# Patient Record
Sex: FEMALE | Race: WHITE | Employment: OTHER | URBAN - METROPOLITAN AREA
[De-identification: names, ages, dates, MRNs, and addresses within clinical notes are randomized per-mention and may not be internally consistent; named-entity substitution may affect disease eponyms.]

---

## 2019-02-19 ENCOUNTER — HOSPITAL ENCOUNTER (EMERGENCY)
Age: 84
Discharge: HOME | End: 2019-02-19
Attending: EMERGENCY MEDICINE
Payer: MEDICARE

## 2019-02-19 ENCOUNTER — APPOINTMENT (OUTPATIENT)
Dept: GENERAL RADIOLOGY | Age: 84
End: 2019-02-19
Payer: MEDICARE

## 2019-02-19 VITALS
DIASTOLIC BLOOD PRESSURE: 83 MMHG | OXYGEN SATURATION: 96 % | TEMPERATURE: 97.7 F | SYSTOLIC BLOOD PRESSURE: 174 MMHG | HEIGHT: 62 IN | BODY MASS INDEX: 23 KG/M2 | WEIGHT: 125 LBS | RESPIRATION RATE: 18 BRPM | HEART RATE: 90 BPM

## 2019-02-19 DIAGNOSIS — S52.562A CLOSED BARTON'S FRACTURE OF LEFT RADIUS, INITIAL ENCOUNTER: Primary | ICD-10-CM

## 2019-02-19 PROCEDURE — 73110 X-RAY EXAM OF WRIST: CPT

## 2019-02-19 PROCEDURE — 6370000000 HC RX 637 (ALT 250 FOR IP): Performed by: EMERGENCY MEDICINE

## 2019-02-19 PROCEDURE — 99283 EMERGENCY DEPT VISIT LOW MDM: CPT

## 2019-02-19 PROCEDURE — 29125 APPL SHORT ARM SPLINT STATIC: CPT

## 2019-02-19 PROCEDURE — 73090 X-RAY EXAM OF FOREARM: CPT

## 2019-02-19 RX ORDER — HYDROCODONE BITARTRATE AND ACETAMINOPHEN 5; 325 MG/1; MG/1
1 TABLET ORAL ONCE
Status: COMPLETED | OUTPATIENT
Start: 2019-02-19 | End: 2019-02-19

## 2019-02-19 RX ORDER — ROPINIROLE 0.25 MG/1
1 TABLET, FILM COATED ORAL NIGHTLY
COMMUNITY
Start: 2020-11-23 | End: 2021-09-28

## 2019-02-19 RX ORDER — HYDROCODONE BITARTRATE AND ACETAMINOPHEN 5; 325 MG/1; MG/1
1 TABLET ORAL EVERY 6 HOURS PRN
Qty: 12 TABLET | Refills: 0 | Status: SHIPPED | OUTPATIENT
Start: 2019-02-19 | End: 2019-02-22

## 2019-02-19 RX ADMIN — HYDROCODONE BITARTRATE AND ACETAMINOPHEN 1 TABLET: 5; 325 TABLET ORAL at 14:26

## 2019-02-19 ASSESSMENT — ENCOUNTER SYMPTOMS
SHORTNESS OF BREATH: 0
EYE PAIN: 0
RHINORRHEA: 0
NAUSEA: 0
COLOR CHANGE: 0
BACK PAIN: 0
DIARRHEA: 0
WHEEZING: 0
EYE REDNESS: 0
VOMITING: 0
SORE THROAT: 0
COUGH: 0
ABDOMINAL PAIN: 0
SINUS PRESSURE: 0

## 2019-02-19 ASSESSMENT — PAIN DESCRIPTION - ORIENTATION
ORIENTATION: LEFT
ORIENTATION: LEFT

## 2019-02-19 ASSESSMENT — PAIN DESCRIPTION - ONSET: ONSET: GRADUAL

## 2019-02-19 ASSESSMENT — PAIN DESCRIPTION - LOCATION
LOCATION: WRIST
LOCATION: ARM

## 2019-02-19 ASSESSMENT — PAIN DESCRIPTION - PROGRESSION: CLINICAL_PROGRESSION: GRADUALLY WORSENING

## 2019-02-19 ASSESSMENT — PAIN DESCRIPTION - DESCRIPTORS
DESCRIPTORS: SHARP;ACHING
DESCRIPTORS: ACHING

## 2019-02-19 ASSESSMENT — PAIN DESCRIPTION - PAIN TYPE: TYPE: ACUTE PAIN

## 2019-02-19 ASSESSMENT — PAIN SCALES - GENERAL
PAINLEVEL_OUTOF10: 10
PAINLEVEL_OUTOF10: 10
PAINLEVEL_OUTOF10: 8

## 2019-02-19 ASSESSMENT — PAIN DESCRIPTION - FREQUENCY
FREQUENCY: CONTINUOUS
FREQUENCY: CONTINUOUS

## 2020-01-09 ENCOUNTER — APPOINTMENT (OUTPATIENT)
Dept: CT IMAGING | Age: 85
End: 2020-01-09
Payer: COMMERCIAL

## 2020-01-09 ENCOUNTER — HOSPITAL ENCOUNTER (EMERGENCY)
Age: 85
Discharge: HOME | End: 2020-01-09
Attending: EMERGENCY MEDICINE
Payer: COMMERCIAL

## 2020-01-09 VITALS
TEMPERATURE: 96.4 F | BODY MASS INDEX: 23.41 KG/M2 | DIASTOLIC BLOOD PRESSURE: 81 MMHG | WEIGHT: 124 LBS | SYSTOLIC BLOOD PRESSURE: 190 MMHG | HEIGHT: 61 IN | HEART RATE: 92 BPM | RESPIRATION RATE: 16 BRPM

## 2020-01-09 PROCEDURE — 70450 CT HEAD/BRAIN W/O DYE: CPT

## 2020-01-09 PROCEDURE — 72125 CT NECK SPINE W/O DYE: CPT

## 2020-01-09 PROCEDURE — 99284 EMERGENCY DEPT VISIT MOD MDM: CPT

## 2020-01-09 NOTE — ED NOTES
Bed: 06  Expected date:   Expected time:   Means of arrival:   Comments:  ems     Anajna Smalls RN  01/09/20 5644

## 2020-01-09 NOTE — ED PROVIDER NOTES
HPI:  1/9/20, Time: 6:39 PM         June Deedee Zambrano is a 80 y.o. female presenting to the ED for fall. Patient mechanical fall at her nursing facility. She did strike her head, no loss of conscious. She does complain of occipital pain. Also complains of left-sided paraspinal neck pain. Denies any nausea, vomit, paresthesias, weakness in the extremities, or any other symptoms or complaints. Review of Systems:   Pertinent positives and negatives are stated within HPI, all other systems reviewed and are negative.          --------------------------------------------- PAST HISTORY ---------------------------------------------  Past Medical History:  has a past medical history of Anemia due to acute blood loss, C2 cervical fracture ([facility] [address] CAD (coronary artery disease), Fracture of left hip ([facility] [address] HTN (hypertension), Hyperlipidemia, Hyperlipidemia, Hypertension, and Protein calorie malnutrition ([facility] [address] Past Surgical History:  has a past surgical history that includes angioplasty; ECHO Compl W Dop Color Flow (11/7/2012); and Hip fracture surgery (Left, 5/25/15). Social History:  reports that she has never smoked. She has never used smokeless tobacco. She reports previous drug use. She reports that she does not drink alcohol. Family History: family history includes Heart Disease in her father and mother. The patients home medications have been reviewed. Allergies: Patient has no known allergies. -------------------------------------------------- RESULTS -------------------------------------------------  All laboratory and radiology results have been personally reviewed by myself   LABS:  No results found for this visit on 01/09/20. RADIOLOGY:  Interpreted by Radiologist.  Adolfo Earthly Contrast   Final Result   No indication for an acute intracranial process.             CT Cervical Spine WO Contrast   Final Result      No acute fracture or spondylolisthesis is identified on CT of cervical spine.      Diffuse degenerative disc and facet disease result in no severe   central or foraminal stenosis. Atherosclerotic vascular disease.          ------------------------- NURSING NOTES AND VITALS REVIEWED ---------------------------   The nursing notes within the ED encounter and vital signs as below have been reviewed. BP (!) 190/81   Pulse 92   Temp 96.4 °F (35.8 °C) (Temporal)   Resp 16   Ht 5' 1\" (1.549 m)   Wt 124 lb (56.2 kg)   Breastfeeding? No   BMI 23.43 kg/m²   Oxygen Saturation Interpretation: Normal      ---------------------------------------------------PHYSICAL EXAM--------------------------------------      Constitutional/General: Alert and oriented x3, well appearing, non toxic in NAD  Head: Normocephalic and atraumatic  Eyes: PERRL, EOMI  Mouth: Oropharynx clear, handling secretions, no trismus  Neck: Supple, full ROM, no C-spine tenderness or step-offs  Pulmonary: Lungs clear to auscultation bilaterally, no wheezes, rales, or rhonchi. Not in respiratory distress  Cardiovascular:  Regular rate and rhythm, no murmurs, gallops, or rubs. 2+ distal pulses  Abdomen: Soft, non tender, non distended, no guarding or rebound  Extremities: Moves all extremities x 4. Warm and well perfused  Skin: warm and dry without rash  Neurologic: GCS 15, no focal deficits  Psych: Normal Affect      ------------------------------ ED COURSE/MEDICAL DECISION MAKING----------------------  Medications - No data to display      ED COURSE:       Medical Decision Making:    Imaging reviewed. Patient be discharged back to her facility. Counseling: The emergency provider has spoken with the patient and discussed todays results, in addition to providing specific details for the plan of care and counseling regarding the diagnosis and prognosis.   Questions are answered at this time and they are agreeable with the plan.      --------------------------------- IMPRESSION AND DISPOSITION ---------------------------------    IMPRESSION  1. Closed head injury, initial encounter        DISPOSITION  Disposition: Discharge to home  Patient condition is stable      NOTE: This report was transcribed using voice recognition software.  Every effort was made to ensure accuracy; however, inadvertent computerized transcription errors may be present        Arelsi Oneal MD  01/09/20 5502

## 2020-01-10 NOTE — ED NOTES
Mumtaz Langford called at [facility] to give report and ask permission for son to drive her back to facility - Brian Leon gave approval     Rony Harris RN  01/09/20 1945

## 2020-01-10 NOTE — ED NOTES
Pt alert and oriented x3. Speech clear. Respirations easy/unlabored. Skin warm/dry. Appropriate color. No signs of acute distress noted. Pt/family teaching provided; verbalized understanding. Pt stable for discharge by wheelchair to son's car to [facility].        mUu Linder RN  01/09/20 2000

## 2020-01-24 ENCOUNTER — APPOINTMENT (OUTPATIENT)
Dept: GENERAL RADIOLOGY | Age: 85
End: 2020-01-24
Payer: COMMERCIAL

## 2020-01-24 ENCOUNTER — HOSPITAL ENCOUNTER (EMERGENCY)
Age: 85
Discharge: HOME | End: 2020-01-24
Attending: EMERGENCY MEDICINE
Payer: COMMERCIAL

## 2020-01-24 ENCOUNTER — APPOINTMENT (OUTPATIENT)
Dept: CT IMAGING | Age: 85
End: 2020-01-24
Payer: COMMERCIAL

## 2020-01-24 VITALS
HEART RATE: 86 BPM | RESPIRATION RATE: 16 BRPM | OXYGEN SATURATION: 95 % | TEMPERATURE: 97.9 F | DIASTOLIC BLOOD PRESSURE: 99 MMHG | SYSTOLIC BLOOD PRESSURE: 184 MMHG

## 2020-01-24 PROCEDURE — 73030 X-RAY EXAM OF SHOULDER: CPT

## 2020-01-24 PROCEDURE — 72125 CT NECK SPINE W/O DYE: CPT

## 2020-01-24 PROCEDURE — 70450 CT HEAD/BRAIN W/O DYE: CPT

## 2020-01-24 PROCEDURE — 99284 EMERGENCY DEPT VISIT MOD MDM: CPT

## 2020-01-24 ASSESSMENT — PAIN SCALES - GENERAL: PAINLEVEL_OUTOF10: 6

## 2020-01-25 NOTE — ED NOTES
Patient presents by ems from [facility] after a fall, pt states she did hit her head but denies LOC and denies a headache at this time. Complaints of right shoulder pain. Alert and oriented x 4.        Reina Bowles RN  01/24/20 1942
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
## 2020-10-29 PROCEDURE — G0008 ADMIN INFLUENZA VIRUS VAC: HCPCS | Performed by: FAMILY MEDICINE

## 2020-10-29 PROCEDURE — 90694 VACC AIIV4 NO PRSRV 0.5ML IM: CPT | Performed by: FAMILY MEDICINE

## 2020-10-29 RX ORDER — A/SINGAPORE/GP1908/2015 IVR-180 (AN A/MICHIGAN/45/2015 (H1N1)PDM09-LIKE VIRUS, A/HONG KONG/4801/2014, NYMC X-263B (H3N2) (AN A/HONG KONG/4801/2014-LIKE VIRUS), AND B/BRISBANE/60/2008, WILD TYPE (A B/BRISBANE/60/2008-LIKE VIRUS) 15; 15; 15 UG/.5ML; UG/.5ML; UG/.5ML
0.5 INJECTION, SUSPENSION INTRAMUSCULAR ONCE
COMMUNITY
Start: 2020-10-29 | End: 2020-10-29

## 2021-09-05 LAB
SARS-COV-2: NOT DETECTED
SOURCE: NORMAL

## 2021-09-16 LAB
SARS-COV-2: NOT DETECTED
SOURCE: NORMAL

## 2021-09-17 RX ORDER — MULTIVITAMIN
1 TABLET ORAL DAILY
COMMUNITY
Start: 2020-05-05 | End: 2022-02-07

## 2021-09-17 RX ORDER — CALCIUM CARBONATE 200(500)MG
1 TABLET,CHEWABLE ORAL 3 TIMES DAILY PRN
COMMUNITY
Start: 2021-04-23

## 2021-09-17 RX ORDER — ONDANSETRON 4 MG/1
4 TABLET, FILM COATED ORAL EVERY 8 HOURS PRN
COMMUNITY
Start: 2020-05-05

## 2021-09-17 RX ORDER — LORATADINE 10 MG/1
10 TABLET ORAL DAILY
COMMUNITY
Start: 2020-11-23 | End: 2021-12-08

## 2021-09-17 RX ORDER — BACITRACIN, NEOMYCIN, POLYMYXIN B 400; 3.5; 5 [USP'U]/G; MG/G; [USP'U]/G
OINTMENT TOPICAL PRN
COMMUNITY
Start: 2020-11-23

## 2021-09-17 RX ORDER — CHLORPHENIRAMINE MALEATE 4 MG/1
4 TABLET ORAL EVERY EVENING
COMMUNITY
Start: 2021-06-02 | End: 2022-02-09 | Stop reason: ALTCHOICE

## 2021-09-17 RX ORDER — LOPERAMIDE HYDROCHLORIDE 2 MG/1
4 TABLET ORAL 3 TIMES DAILY PRN
COMMUNITY
Start: 2020-05-05

## 2021-09-17 RX ORDER — TROLAMINE SALICYLATE 10 G/100G
1 CREAM TOPICAL 2 TIMES DAILY PRN
COMMUNITY
Start: 2020-11-23

## 2021-09-17 RX ORDER — ACETAMINOPHEN 325 MG/1
650 TABLET ORAL EVERY 4 HOURS PRN
COMMUNITY
Start: 2020-05-05

## 2021-09-17 RX ORDER — GUAIFENESIN AND DEXTROMETHORPHAN HYDROBROMIDE 100; 10 MG/5ML; MG/5ML
10 SOLUTION ORAL EVERY 4 HOURS PRN
COMMUNITY
Start: 2020-11-23 | End: 2022-09-29

## 2021-09-20 VITALS — WEIGHT: 147.8 LBS | BODY MASS INDEX: 27.93 KG/M2

## 2021-09-20 RX ORDER — TRIAMCINOLONE ACETONIDE 1 MG/G
1 CREAM TOPICAL 2 TIMES DAILY
COMMUNITY
Start: 2021-01-29 | End: 2021-09-28

## 2021-09-20 SDOH — ECONOMIC STABILITY: FOOD INSECURITY: WITHIN THE PAST 12 MONTHS, YOU WORRIED THAT YOUR FOOD WOULD RUN OUT BEFORE YOU GOT MONEY TO BUY MORE.: NEVER TRUE

## 2021-09-20 SDOH — ECONOMIC STABILITY: FOOD INSECURITY: WITHIN THE PAST 12 MONTHS, THE FOOD YOU BOUGHT JUST DIDN'T LAST AND YOU DIDN'T HAVE MONEY TO GET MORE.: NEVER TRUE

## 2021-09-20 ASSESSMENT — SOCIAL DETERMINANTS OF HEALTH (SDOH): HOW HARD IS IT FOR YOU TO PAY FOR THE VERY BASICS LIKE FOOD, HOUSING, MEDICAL CARE, AND HEATING?: NOT HARD AT ALL
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
## 2021-09-28 ENCOUNTER — OFFICE VISIT (OUTPATIENT)
Dept: PRIMARY CARE CLINIC | Age: 86
End: 2021-09-28
Payer: COMMERCIAL

## 2021-09-28 VITALS
DIASTOLIC BLOOD PRESSURE: 72 MMHG | TEMPERATURE: 98.2 F | BODY MASS INDEX: 27.9 KG/M2 | OXYGEN SATURATION: 96 % | HEIGHT: 61 IN | SYSTOLIC BLOOD PRESSURE: 127 MMHG | WEIGHT: 147.8 LBS | RESPIRATION RATE: 18 BRPM | HEART RATE: 100 BPM

## 2021-09-28 DIAGNOSIS — E78.5 HYPERLIPIDEMIA, UNSPECIFIED HYPERLIPIDEMIA TYPE: Chronic | ICD-10-CM

## 2021-09-28 DIAGNOSIS — D62 ANEMIA DUE TO ACUTE BLOOD LOSS: ICD-10-CM

## 2021-09-28 DIAGNOSIS — M43.16 SPONDYLOLISTHESIS OF LUMBAR REGION: ICD-10-CM

## 2021-09-28 DIAGNOSIS — E55.9 VITAMIN D DEFICIENCY: ICD-10-CM

## 2021-09-28 DIAGNOSIS — M15.9 PRIMARY OSTEOARTHRITIS INVOLVING MULTIPLE JOINTS: ICD-10-CM

## 2021-09-28 DIAGNOSIS — Z91.81 HISTORY OF FALLING: ICD-10-CM

## 2021-09-28 DIAGNOSIS — I25.10 CORONARY ARTERY DISEASE INVOLVING NATIVE CORONARY ARTERY OF NATIVE HEART WITHOUT ANGINA PECTORIS: ICD-10-CM

## 2021-09-28 DIAGNOSIS — I10 ESSENTIAL HYPERTENSION: Primary | ICD-10-CM

## 2021-09-28 DIAGNOSIS — F41.9 ANXIETY DISORDER, UNSPECIFIED TYPE: ICD-10-CM

## 2021-09-28 DIAGNOSIS — R26.2 DIFFICULTY WALKING: ICD-10-CM

## 2021-09-28 PROCEDURE — 1123F ACP DISCUSS/DSCN MKR DOCD: CPT | Performed by: FAMILY MEDICINE

## 2021-09-28 PROCEDURE — 1090F PRES/ABSN URINE INCON ASSESS: CPT | Performed by: FAMILY MEDICINE

## 2021-09-28 PROCEDURE — G8417 CALC BMI ABV UP PARAM F/U: HCPCS | Performed by: FAMILY MEDICINE

## 2021-09-28 RX ORDER — ROPINIROLE 1 MG/1
1 TABLET, FILM COATED ORAL NIGHTLY
COMMUNITY
Start: 2021-09-05 | End: 2021-09-29

## 2021-09-28 RX ORDER — ACETAMINOPHEN 325 MG/1
650 TABLET ORAL EVERY 4 HOURS PRN
COMMUNITY

## 2021-09-28 ASSESSMENT — PATIENT HEALTH QUESTIONNAIRE - PHQ9
SUM OF ALL RESPONSES TO PHQ QUESTIONS 1-9: 1
SUM OF ALL RESPONSES TO PHQ QUESTIONS 1-9: 1
2. FEELING DOWN, DEPRESSED OR HOPELESS: 1
1. LITTLE INTEREST OR PLEASURE IN DOING THINGS: 0
SUM OF ALL RESPONSES TO PHQ QUESTIONS 1-9: 1
SUM OF ALL RESPONSES TO PHQ9 QUESTIONS 1 & 2: 1

## 2021-09-29 LAB
ALBUMIN SERPL-MCNC: 3.8 G/DL (ref 3.5–5.2)
ALP BLD-CCNC: 83 U/L (ref 35–104)
ALT SERPL-CCNC: 15 U/L (ref 0–32)
ANION GAP SERPL CALCULATED.3IONS-SCNC: 10 MMOL/L (ref 7–16)
AST SERPL-CCNC: 21 U/L (ref 0–31)
BASOPHILS ABSOLUTE: 0.05 E9/L (ref 0–0.2)
BASOPHILS RELATIVE PERCENT: 0.9 % (ref 0–2)
BILIRUB SERPL-MCNC: 0.3 MG/DL (ref 0–1.2)
BUN BLDV-MCNC: 15 MG/DL (ref 6–23)
CALCIUM SERPL-MCNC: 8.9 MG/DL (ref 8.6–10.2)
CHLORIDE BLD-SCNC: 97 MMOL/L (ref 98–107)
CHOLESTEROL, TOTAL: 217 MG/DL (ref 0–199)
CO2: 25 MMOL/L (ref 22–29)
CREAT SERPL-MCNC: 0.9 MG/DL (ref 0.5–1)
EOSINOPHILS ABSOLUTE: 0.13 E9/L (ref 0.05–0.5)
EOSINOPHILS RELATIVE PERCENT: 2.3 % (ref 0–6)
FOLATE: >20 NG/ML (ref 4.8–24.2)
GFR AFRICAN AMERICAN: >60
GFR NON-AFRICAN AMERICAN: 59 ML/MIN/1.73
GLUCOSE BLD-MCNC: 101 MG/DL (ref 74–99)
HCT VFR BLD CALC: 31.6 % (ref 34–48)
HDLC SERPL-MCNC: 57 MG/DL
HEMOGLOBIN: 10.2 G/DL (ref 11.5–15.5)
IMMATURE GRANULOCYTES #: 0.01 E9/L
IMMATURE GRANULOCYTES %: 0.2 % (ref 0–5)
LDL CHOLESTEROL CALCULATED: 133 MG/DL (ref 0–99)
LYMPHOCYTES ABSOLUTE: 1.72 E9/L (ref 1.5–4)
LYMPHOCYTES RELATIVE PERCENT: 30 % (ref 20–42)
MCH RBC QN AUTO: 26.7 PG (ref 26–35)
MCHC RBC AUTO-ENTMCNC: 32.3 % (ref 32–34.5)
MCV RBC AUTO: 82.7 FL (ref 80–99.9)
MONOCYTES ABSOLUTE: 1.02 E9/L (ref 0.1–0.95)
MONOCYTES RELATIVE PERCENT: 17.8 % (ref 2–12)
NEUTROPHILS ABSOLUTE: 2.81 E9/L (ref 1.8–7.3)
NEUTROPHILS RELATIVE PERCENT: 48.8 % (ref 43–80)
PDW BLD-RTO: 14.5 FL (ref 11.5–15)
PLATELET # BLD: 260 E9/L (ref 130–450)
PMV BLD AUTO: 10.7 FL (ref 7–12)
POTASSIUM SERPL-SCNC: 4.1 MMOL/L (ref 3.5–5)
RBC # BLD: 3.82 E12/L (ref 3.5–5.5)
SODIUM BLD-SCNC: 132 MMOL/L (ref 132–146)
TOTAL PROTEIN: 6.2 G/DL (ref 6.4–8.3)
TRIGL SERPL-MCNC: 137 MG/DL (ref 0–149)
TSH SERPL DL<=0.05 MIU/L-ACNC: 1.91 UIU/ML (ref 0.27–4.2)
VITAMIN B-12: 1146 PG/ML (ref 211–946)
VLDLC SERPL CALC-MCNC: 27 MG/DL
WBC # BLD: 5.7 E9/L (ref 4.5–11.5)

## 2021-10-05 RX ORDER — BISACODYL 5 MG
TABLET, DELAYED RELEASE (ENTERIC COATED) ORAL
Qty: 60 TABLET | Refills: 5 | Status: SHIPPED
Start: 2021-10-05 | End: 2021-11-10

## 2021-10-08 LAB
FERRITIN: 57 NG/ML
SARS-COV-2: NOT DETECTED
SOURCE: NORMAL
VITAMIN D 25-HYDROXY: 32 NG/ML (ref 30–100)

## 2021-10-09 LAB — SOURCE: NORMAL

## 2021-10-13 LAB — SOURCE: NORMAL

## 2021-10-19 LAB
SARS-COV-2: NOT DETECTED
SOURCE: NORMAL

## 2021-10-20 LAB
SARS-COV-2: NOT DETECTED
SOURCE: NORMAL

## 2021-10-25 DIAGNOSIS — M15.9 PRIMARY OSTEOARTHRITIS INVOLVING MULTIPLE JOINTS: Primary | ICD-10-CM

## 2021-10-25 DIAGNOSIS — M43.16 SPONDYLOLISTHESIS OF LUMBAR REGION: ICD-10-CM

## 2021-10-25 DIAGNOSIS — M54.2 NECK PAIN: ICD-10-CM

## 2021-10-25 RX ORDER — HYDROCODONE BITARTRATE AND ACETAMINOPHEN 5; 325 MG/1; MG/1
1 TABLET ORAL EVERY 8 HOURS PRN
Qty: 90 TABLET | Refills: 0 | Status: SHIPPED
Start: 2021-10-25 | End: 2021-11-08 | Stop reason: DRUGHIGH

## 2021-10-29 ENCOUNTER — TELEPHONE (OUTPATIENT)
Dept: PRIMARY CARE CLINIC | Age: 86
End: 2021-10-29

## 2021-10-29 RX ORDER — LISINOPRIL 5 MG/1
5 TABLET ORAL DAILY
Qty: 30 TABLET | Refills: 5 | Status: SHIPPED
Start: 2021-10-29 | End: 2021-11-01 | Stop reason: DRUGHIGH

## 2021-10-29 NOTE — TELEPHONE ENCOUNTER
BP has been frequently high. On diltiazem 120 daily. Send order - Start lisinopril 5 mg 1 tab daily.

## 2021-11-01 ENCOUNTER — OFFICE VISIT (OUTPATIENT)
Dept: PRIMARY CARE CLINIC | Age: 86
End: 2021-11-01
Payer: COMMERCIAL

## 2021-11-01 VITALS
TEMPERATURE: 98.5 F | WEIGHT: 148.4 LBS | DIASTOLIC BLOOD PRESSURE: 86 MMHG | HEART RATE: 92 BPM | OXYGEN SATURATION: 95 % | SYSTOLIC BLOOD PRESSURE: 153 MMHG | HEIGHT: 61 IN | RESPIRATION RATE: 18 BRPM | BODY MASS INDEX: 28.02 KG/M2

## 2021-11-01 DIAGNOSIS — I10 ESSENTIAL HYPERTENSION: Primary | ICD-10-CM

## 2021-11-01 DIAGNOSIS — M15.9 PRIMARY OSTEOARTHRITIS INVOLVING MULTIPLE JOINTS: ICD-10-CM

## 2021-11-01 DIAGNOSIS — D62 ANEMIA DUE TO ACUTE BLOOD LOSS: ICD-10-CM

## 2021-11-01 DIAGNOSIS — Z23 NEED FOR INFLUENZA VACCINATION: ICD-10-CM

## 2021-11-01 DIAGNOSIS — K59.09 OTHER CONSTIPATION: ICD-10-CM

## 2021-11-01 DIAGNOSIS — I25.10 CORONARY ARTERY DISEASE INVOLVING NATIVE CORONARY ARTERY OF NATIVE HEART WITHOUT ANGINA PECTORIS: ICD-10-CM

## 2021-11-01 DIAGNOSIS — R26.2 DIFFICULTY WALKING: ICD-10-CM

## 2021-11-01 DIAGNOSIS — F03.90 SENILE DEMENTIA WITHOUT BEHAVIORAL DISTURBANCE (HCC): ICD-10-CM

## 2021-11-01 PROCEDURE — 1123F ACP DISCUSS/DSCN MKR DOCD: CPT | Performed by: FAMILY MEDICINE

## 2021-11-01 PROCEDURE — G0008 ADMIN INFLUENZA VIRUS VAC: HCPCS | Performed by: FAMILY MEDICINE

## 2021-11-01 PROCEDURE — G8484 FLU IMMUNIZE NO ADMIN: HCPCS | Performed by: FAMILY MEDICINE

## 2021-11-01 PROCEDURE — 1090F PRES/ABSN URINE INCON ASSESS: CPT | Performed by: FAMILY MEDICINE

## 2021-11-01 PROCEDURE — 90694 VACC AIIV4 NO PRSRV 0.5ML IM: CPT | Performed by: FAMILY MEDICINE

## 2021-11-01 PROCEDURE — G8417 CALC BMI ABV UP PARAM F/U: HCPCS | Performed by: FAMILY MEDICINE

## 2021-11-01 RX ORDER — LISINOPRIL 10 MG/1
10 TABLET ORAL DAILY
Qty: 90 TABLET | Refills: 1 | Status: SHIPPED
Start: 2021-11-01 | End: 2021-11-15

## 2021-11-01 RX ORDER — SENNA PLUS 8.6 MG/1
2 TABLET ORAL NIGHTLY
Qty: 60 TABLET | Refills: 11 | Status: SHIPPED | OUTPATIENT
Start: 2021-11-01 | End: 2022-05-03
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
## 2021-11-08 ENCOUNTER — TELEPHONE (OUTPATIENT)
Dept: PRIMARY CARE CLINIC | Age: 86
End: 2021-11-08

## 2021-11-08 DIAGNOSIS — M43.16 SPONDYLOLISTHESIS OF LUMBAR REGION: ICD-10-CM

## 2021-11-08 DIAGNOSIS — M15.9 PRIMARY OSTEOARTHRITIS INVOLVING MULTIPLE JOINTS: ICD-10-CM

## 2021-11-08 DIAGNOSIS — M54.2 NECK PAIN: ICD-10-CM

## 2021-11-08 RX ORDER — HYDROCODONE BITARTRATE AND ACETAMINOPHEN 5; 325 MG/1; MG/1
1 TABLET ORAL 2 TIMES DAILY
Qty: 90 TABLET | Refills: 0 | Status: SHIPPED
Start: 2021-11-08 | End: 2021-12-02 | Stop reason: SDUPTHER

## 2021-11-08 NOTE — TELEPHONE ENCOUNTER
Fax from Becca Page 1947. Breanna ahs an order for Ativan 2mg qd and PRN. You increased Gillett to BID today. She had a fall 10/28 and 11/3. She has increased anxietyand verbal outbursts toward staff. I'm not comfortable giving a prn dose of Ativan while she has increased falls. I also usually give Tylenol at HS, not Norco and it's effective. I may hold the straight 2nd dose of Norco.  Any recommendations for her behaviors?

## 2021-11-08 NOTE — TELEPHONE ENCOUNTER
[facility] called. She states that she and Peg Sailors are looking into this further. They state that she has been getting the Norco at night, unlike what the fax states. They are asking that you hold off on any decisions until they get back to us.

## 2021-11-08 NOTE — TELEPHONE ENCOUNTER
Fax from Becca Moeller7. Breanna takes prn Norco 5/325 BID consistantly and on in the afternoon occasionally. Ok to make BID straight and 1 qd PRN?

## 2021-11-10 RX ORDER — DILTIAZEM HYDROCHLORIDE 120 MG/1
CAPSULE, COATED, EXTENDED RELEASE ORAL
Qty: 30 CAPSULE | Refills: 11 | Status: SHIPPED
Start: 2021-11-10 | End: 2022-10-13

## 2021-11-15 RX ORDER — LISINOPRIL 10 MG/1
10 TABLET ORAL 2 TIMES DAILY
Qty: 62 TABLET | Refills: 11 | Status: SHIPPED
Start: 2021-11-15 | End: 2022-10-13

## 2021-11-15 RX ORDER — ROPINIROLE 1 MG/1
2 TABLET, FILM COATED ORAL NIGHTLY
Qty: 60 TABLET | Refills: 11 | Status: SHIPPED
Start: 2021-11-15 | End: 2021-12-02
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
## 2021-12-02 ENCOUNTER — OFFICE VISIT (OUTPATIENT)
Dept: PRIMARY CARE CLINIC | Age: 86
End: 2021-12-02
Payer: COMMERCIAL

## 2021-12-02 VITALS
SYSTOLIC BLOOD PRESSURE: 111 MMHG | BODY MASS INDEX: 27.19 KG/M2 | HEART RATE: 101 BPM | DIASTOLIC BLOOD PRESSURE: 77 MMHG | WEIGHT: 144 LBS | TEMPERATURE: 98.3 F | RESPIRATION RATE: 18 BRPM | OXYGEN SATURATION: 96 % | HEIGHT: 61 IN

## 2021-12-02 DIAGNOSIS — F03.90 SENILE DEMENTIA WITHOUT BEHAVIORAL DISTURBANCE (HCC): ICD-10-CM

## 2021-12-02 DIAGNOSIS — M15.9 PRIMARY OSTEOARTHRITIS INVOLVING MULTIPLE JOINTS: ICD-10-CM

## 2021-12-02 DIAGNOSIS — M54.2 NECK PAIN: ICD-10-CM

## 2021-12-02 DIAGNOSIS — K59.09 OTHER CONSTIPATION: ICD-10-CM

## 2021-12-02 DIAGNOSIS — I10 ESSENTIAL HYPERTENSION: Primary | ICD-10-CM

## 2021-12-02 DIAGNOSIS — I25.10 CORONARY ARTERY DISEASE INVOLVING NATIVE CORONARY ARTERY OF NATIVE HEART WITHOUT ANGINA PECTORIS: ICD-10-CM

## 2021-12-02 DIAGNOSIS — M43.16 SPONDYLOLISTHESIS OF LUMBAR REGION: ICD-10-CM

## 2021-12-02 DIAGNOSIS — F41.9 ANXIETY DISORDER, UNSPECIFIED TYPE: ICD-10-CM

## 2021-12-02 PROCEDURE — 1123F ACP DISCUSS/DSCN MKR DOCD: CPT | Performed by: FAMILY MEDICINE

## 2021-12-02 PROCEDURE — 1090F PRES/ABSN URINE INCON ASSESS: CPT | Performed by: FAMILY MEDICINE

## 2021-12-02 PROCEDURE — G8484 FLU IMMUNIZE NO ADMIN: HCPCS | Performed by: FAMILY MEDICINE

## 2021-12-02 PROCEDURE — G8417 CALC BMI ABV UP PARAM F/U: HCPCS | Performed by: FAMILY MEDICINE

## 2021-12-02 RX ORDER — HYDROCODONE BITARTRATE AND ACETAMINOPHEN 5; 325 MG/1; MG/1
1 TABLET ORAL 2 TIMES DAILY
Qty: 90 TABLET | Refills: 0 | Status: SHIPPED
Start: 2021-12-02 | End: 2022-01-10 | Stop reason: SDUPTHER

## 2021-12-02 RX ORDER — ROPINIROLE 2 MG/1
1 TABLET, FILM COATED ORAL EVERY EVENING
COMMUNITY
Start: 2021-11-15 | End: 2022-03-31 | Stop reason: DRUGHIGH

## 2021-12-08 RX ORDER — LORATADINE 10 MG/1
TABLET ORAL
Qty: 31 TABLET | Refills: 11 | Status: SHIPPED
Start: 2021-12-08 | End: 2022-02-07

## 2021-12-24 LAB
SARS-COV-2: NOT DETECTED
SOURCE: NORMAL

## 2021-12-31 LAB
SARS-COV-2: NOT DETECTED
SOURCE: NORMAL

## 2022-01-05 LAB
SARS-COV-2: NOT DETECTED
SOURCE: NORMAL

## 2022-01-10 ENCOUNTER — OFFICE VISIT (OUTPATIENT)
Dept: PRIMARY CARE CLINIC | Age: 87
End: 2022-01-10
Payer: COMMERCIAL

## 2022-01-10 VITALS
HEIGHT: 61 IN | RESPIRATION RATE: 18 BRPM | OXYGEN SATURATION: 93 % | HEART RATE: 66 BPM | WEIGHT: 144.6 LBS | DIASTOLIC BLOOD PRESSURE: 78 MMHG | TEMPERATURE: 98.3 F | SYSTOLIC BLOOD PRESSURE: 136 MMHG | BODY MASS INDEX: 27.3 KG/M2

## 2022-01-10 DIAGNOSIS — F03.90 SENILE DEMENTIA WITHOUT BEHAVIORAL DISTURBANCE (HCC): ICD-10-CM

## 2022-01-10 DIAGNOSIS — I25.10 CORONARY ARTERY DISEASE INVOLVING NATIVE CORONARY ARTERY OF NATIVE HEART WITHOUT ANGINA PECTORIS: ICD-10-CM

## 2022-01-10 DIAGNOSIS — I10 ESSENTIAL HYPERTENSION: Primary | ICD-10-CM

## 2022-01-10 DIAGNOSIS — Z91.81 HISTORY OF FALLING: ICD-10-CM

## 2022-01-10 DIAGNOSIS — M43.16 SPONDYLOLISTHESIS OF LUMBAR REGION: ICD-10-CM

## 2022-01-10 DIAGNOSIS — M54.2 NECK PAIN: ICD-10-CM

## 2022-01-10 DIAGNOSIS — M15.9 PRIMARY OSTEOARTHRITIS INVOLVING MULTIPLE JOINTS: ICD-10-CM

## 2022-01-10 DIAGNOSIS — R26.2 DIFFICULTY WALKING: ICD-10-CM

## 2022-01-10 PROCEDURE — G8417 CALC BMI ABV UP PARAM F/U: HCPCS | Performed by: FAMILY MEDICINE

## 2022-01-10 PROCEDURE — 1090F PRES/ABSN URINE INCON ASSESS: CPT | Performed by: FAMILY MEDICINE

## 2022-01-10 PROCEDURE — 1123F ACP DISCUSS/DSCN MKR DOCD: CPT | Performed by: FAMILY MEDICINE

## 2022-01-10 PROCEDURE — G8484 FLU IMMUNIZE NO ADMIN: HCPCS | Performed by: FAMILY MEDICINE

## 2022-01-10 RX ORDER — HYDROCODONE BITARTRATE AND ACETAMINOPHEN 5; 325 MG/1; MG/1
1 TABLET ORAL 2 TIMES DAILY
Qty: 90 TABLET | Refills: 0 | Status: SHIPPED | OUTPATIENT
Start: 2022-01-10 | End: 2022-02-24

## 2022-01-10 ASSESSMENT — PATIENT HEALTH QUESTIONNAIRE - PHQ9
1. LITTLE INTEREST OR PLEASURE IN DOING THINGS: 0
SUM OF ALL RESPONSES TO PHQ QUESTIONS 1-9: 1
SUM OF ALL RESPONSES TO PHQ9 QUESTIONS 1 & 2: 1
SUM OF ALL RESPONSES TO PHQ QUESTIONS 1-9: 1
SUM OF ALL RESPONSES TO PHQ QUESTIONS 1-9: 1
2. FEELING DOWN, DEPRESSED OR HOPELESS: 1
SUM OF ALL RESPONSES TO PHQ QUESTIONS 1-9: 1
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
## 2022-01-11 LAB
ANION GAP SERPL CALCULATED.3IONS-SCNC: 14 MMOL/L (ref 7–16)
BUN BLDV-MCNC: 19 MG/DL (ref 6–23)
CALCIUM SERPL-MCNC: 9.1 MG/DL (ref 8.6–10.2)
CHLORIDE BLD-SCNC: 99 MMOL/L (ref 98–107)
CO2: 23 MMOL/L (ref 22–29)
CREAT SERPL-MCNC: 1 MG/DL (ref 0.5–1)
GFR AFRICAN AMERICAN: >60
GFR NON-AFRICAN AMERICAN: 52 ML/MIN/1.73
GLUCOSE BLD-MCNC: 99 MG/DL (ref 74–99)
POTASSIUM SERPL-SCNC: 3.7 MMOL/L (ref 3.5–5)
SODIUM BLD-SCNC: 136 MMOL/L (ref 132–146)

## 2022-01-12 LAB
SARS-COV-2: NOT DETECTED
SOURCE: NORMAL

## 2022-01-20 LAB
SARS-COV-2: NOT DETECTED
SOURCE: NORMAL

## 2022-01-26 LAB
SARS-COV-2: NOT DETECTED
SOURCE: NORMAL

## 2022-02-02 LAB
SARS-COV-2: NOT DETECTED
SOURCE: NORMAL

## 2022-02-07 ENCOUNTER — OFFICE VISIT (OUTPATIENT)
Dept: PRIMARY CARE CLINIC | Age: 87
End: 2022-02-07
Payer: COMMERCIAL

## 2022-02-07 VITALS
DIASTOLIC BLOOD PRESSURE: 70 MMHG | RESPIRATION RATE: 18 BRPM | HEART RATE: 97 BPM | WEIGHT: 147.2 LBS | OXYGEN SATURATION: 93 % | HEIGHT: 61 IN | SYSTOLIC BLOOD PRESSURE: 132 MMHG | BODY MASS INDEX: 27.79 KG/M2 | TEMPERATURE: 97.8 F

## 2022-02-07 DIAGNOSIS — I25.10 CORONARY ARTERY DISEASE INVOLVING NATIVE CORONARY ARTERY OF NATIVE HEART WITHOUT ANGINA PECTORIS: ICD-10-CM

## 2022-02-07 DIAGNOSIS — F41.9 ANXIETY DISORDER, UNSPECIFIED TYPE: Primary | ICD-10-CM

## 2022-02-07 DIAGNOSIS — M15.9 PRIMARY OSTEOARTHRITIS INVOLVING MULTIPLE JOINTS: ICD-10-CM

## 2022-02-07 DIAGNOSIS — F03.90 SENILE DEMENTIA WITHOUT BEHAVIORAL DISTURBANCE (HCC): ICD-10-CM

## 2022-02-07 DIAGNOSIS — I10 ESSENTIAL HYPERTENSION: Primary | ICD-10-CM

## 2022-02-07 PROCEDURE — 1090F PRES/ABSN URINE INCON ASSESS: CPT | Performed by: FAMILY MEDICINE

## 2022-02-07 PROCEDURE — 1123F ACP DISCUSS/DSCN MKR DOCD: CPT | Performed by: FAMILY MEDICINE

## 2022-02-07 PROCEDURE — G8484 FLU IMMUNIZE NO ADMIN: HCPCS | Performed by: FAMILY MEDICINE

## 2022-02-07 PROCEDURE — G8417 CALC BMI ABV UP PARAM F/U: HCPCS | Performed by: FAMILY MEDICINE

## 2022-02-07 RX ORDER — LORAZEPAM 2 MG/1
2 TABLET ORAL DAILY
Qty: 30 TABLET | Refills: 5 | Status: SHIPPED | OUTPATIENT
Start: 2022-02-07 | End: 2022-08-06

## 2022-02-07 RX ORDER — LORATADINE 10 MG/1
10 TABLET ORAL EVERY EVENING
Qty: 31 TABLET | Refills: 11
Start: 2022-02-07
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
## 2022-02-08 ENCOUNTER — TELEPHONE (OUTPATIENT)
Dept: PRIMARY CARE CLINIC | Age: 87
End: 2022-02-08

## 2022-02-08 NOTE — TELEPHONE ENCOUNTER
Fax from Becca Page 1947. On 2/7 Loratadine 10mg was changed to give at supper. She also receives chlorpheniramine 4mg q evening. Do you want these both given together?

## 2022-02-09 ENCOUNTER — TELEPHONE (OUTPATIENT)
Dept: PRIMARY CARE CLINIC | Age: 87
End: 2022-02-09

## 2022-02-09 LAB
SARS-COV-2: NOT DETECTED
SOURCE: NORMAL

## 2022-02-14 ENCOUNTER — TELEPHONE (OUTPATIENT)
Dept: PRIMARY CARE CLINIC | Age: 87
End: 2022-02-14

## 2022-02-14 NOTE — TELEPHONE ENCOUNTER
Fax from Becca Page 1947.  Breanna wants her meds changed back to early AM.  Says her legs hurt and the time change didn't help. She will have toast in the AM.  Is it ok to change back?

## 2022-02-16 LAB
SARS-COV-2: NOT DETECTED
SOURCE: NORMAL

## 2022-03-04 ENCOUNTER — OFFICE VISIT (OUTPATIENT)
Dept: PRIMARY CARE CLINIC | Age: 87
End: 2022-03-04
Payer: COMMERCIAL

## 2022-03-04 VITALS
TEMPERATURE: 98.5 F | BODY MASS INDEX: 27.83 KG/M2 | OXYGEN SATURATION: 94 % | SYSTOLIC BLOOD PRESSURE: 136 MMHG | WEIGHT: 147.4 LBS | DIASTOLIC BLOOD PRESSURE: 79 MMHG | HEART RATE: 98 BPM | HEIGHT: 61 IN | RESPIRATION RATE: 18 BRPM

## 2022-03-04 DIAGNOSIS — R26.2 DIFFICULTY WALKING: ICD-10-CM

## 2022-03-04 DIAGNOSIS — Z91.81 HISTORY OF FALLING: ICD-10-CM

## 2022-03-04 DIAGNOSIS — I25.10 CORONARY ARTERY DISEASE INVOLVING NATIVE CORONARY ARTERY OF NATIVE HEART WITHOUT ANGINA PECTORIS: ICD-10-CM

## 2022-03-04 DIAGNOSIS — M15.9 PRIMARY OSTEOARTHRITIS INVOLVING MULTIPLE JOINTS: ICD-10-CM

## 2022-03-04 DIAGNOSIS — I10 ESSENTIAL HYPERTENSION: ICD-10-CM

## 2022-03-04 DIAGNOSIS — F03.90 SENILE DEMENTIA WITHOUT BEHAVIORAL DISTURBANCE (HCC): Primary | ICD-10-CM

## 2022-03-04 PROCEDURE — 1090F PRES/ABSN URINE INCON ASSESS: CPT | Performed by: FAMILY MEDICINE

## 2022-03-04 PROCEDURE — G8417 CALC BMI ABV UP PARAM F/U: HCPCS | Performed by: FAMILY MEDICINE

## 2022-03-04 PROCEDURE — G8484 FLU IMMUNIZE NO ADMIN: HCPCS | Performed by: FAMILY MEDICINE

## 2022-03-04 PROCEDURE — 1123F ACP DISCUSS/DSCN MKR DOCD: CPT | Performed by: FAMILY MEDICINE
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
## 2022-03-10 RX ORDER — DOCUSATE SODIUM 100 MG/1
CAPSULE, LIQUID FILLED ORAL
Qty: 186 CAPSULE | Refills: 3 | Status: SHIPPED
Start: 2022-03-10 | End: 2022-11-02 | Stop reason: SDUPTHER

## 2022-03-11 DIAGNOSIS — M15.9 PRIMARY OSTEOARTHRITIS INVOLVING MULTIPLE JOINTS: Primary | ICD-10-CM

## 2022-03-11 DIAGNOSIS — M43.16 SPONDYLOLISTHESIS OF LUMBAR REGION: ICD-10-CM

## 2022-03-11 RX ORDER — HYDROCODONE BITARTRATE AND ACETAMINOPHEN 5; 325 MG/1; MG/1
TABLET ORAL
Qty: 90 TABLET | Refills: 0 | Status: SHIPPED
Start: 2022-03-11 | End: 2022-04-11

## 2022-03-31 ENCOUNTER — OFFICE VISIT (OUTPATIENT)
Dept: PRIMARY CARE CLINIC | Age: 87
End: 2022-03-31
Payer: COMMERCIAL

## 2022-03-31 VITALS
HEART RATE: 90 BPM | TEMPERATURE: 97.7 F | BODY MASS INDEX: 27.85 KG/M2 | DIASTOLIC BLOOD PRESSURE: 66 MMHG | WEIGHT: 147.49 LBS | OXYGEN SATURATION: 93 % | RESPIRATION RATE: 18 BRPM | HEIGHT: 61 IN | SYSTOLIC BLOOD PRESSURE: 122 MMHG

## 2022-03-31 DIAGNOSIS — F03.90 SENILE DEMENTIA WITHOUT BEHAVIORAL DISTURBANCE (HCC): Primary | ICD-10-CM

## 2022-03-31 DIAGNOSIS — M15.9 PRIMARY OSTEOARTHRITIS INVOLVING MULTIPLE JOINTS: ICD-10-CM

## 2022-03-31 DIAGNOSIS — I25.10 CORONARY ARTERY DISEASE INVOLVING NATIVE CORONARY ARTERY OF NATIVE HEART WITHOUT ANGINA PECTORIS: ICD-10-CM

## 2022-03-31 DIAGNOSIS — I10 ESSENTIAL HYPERTENSION: ICD-10-CM

## 2022-03-31 PROCEDURE — G8484 FLU IMMUNIZE NO ADMIN: HCPCS | Performed by: FAMILY MEDICINE

## 2022-03-31 PROCEDURE — 1123F ACP DISCUSS/DSCN MKR DOCD: CPT | Performed by: FAMILY MEDICINE

## 2022-03-31 PROCEDURE — 1090F PRES/ABSN URINE INCON ASSESS: CPT | Performed by: FAMILY MEDICINE

## 2022-03-31 PROCEDURE — G8417 CALC BMI ABV UP PARAM F/U: HCPCS | Performed by: FAMILY MEDICINE

## 2022-03-31 RX ORDER — ROPINIROLE 2 MG/1
1 TABLET, FILM COATED ORAL EVERY EVENING
Qty: 90 TABLET | Refills: 0 | Status: SHIPPED
Start: 2022-03-31 | End: 2022-10-13
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
## 2022-04-05 NOTE — TELEPHONE ENCOUNTER
[facility] states they didn't receive Rx for this. Did you want 25 mg or 50 mg Rx. OW is asking what dose?

## 2022-04-09 DIAGNOSIS — M43.16 SPONDYLOLISTHESIS OF LUMBAR REGION: ICD-10-CM

## 2022-04-09 DIAGNOSIS — M15.9 PRIMARY OSTEOARTHRITIS INVOLVING MULTIPLE JOINTS: ICD-10-CM

## 2022-04-11 RX ORDER — HYDROCODONE BITARTRATE AND ACETAMINOPHEN 5; 325 MG/1; MG/1
TABLET ORAL
Qty: 90 TABLET | Refills: 0 | Status: SHIPPED | OUTPATIENT
Start: 2022-04-11 | End: 2022-05-11

## 2022-04-19 ENCOUNTER — TELEPHONE (OUTPATIENT)
Dept: PRIMARY CARE CLINIC | Age: 87
End: 2022-04-19

## 2022-04-19 RX ORDER — OXYMETAZOLINE HYDROCHLORIDE 0.05 G/100ML
2 SPRAY NASAL 2 TIMES DAILY PRN
Qty: 1 EACH | Refills: 3 | Status: SHIPPED | OUTPATIENT
Start: 2022-04-19 | End: 2023-04-19

## 2022-04-19 RX ORDER — ZINC GLUCONATE 2 [HP_X]/1
LOZENGE ORAL
Qty: 25 LOZENGE | Refills: 2 | Status: SHIPPED | OUTPATIENT
Start: 2022-04-19 | End: 2022-05-03

## 2022-04-19 NOTE — TELEPHONE ENCOUNTER
Nurse Sophia Yates reports patient has had stuffy nose, cough and RN. eRx for Cold Eeze and Afrin spray BID prn congestion. Continue Robitussion prn cough and loratadine prn RN.
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
## 2022-05-03 ENCOUNTER — OFFICE VISIT (OUTPATIENT)
Dept: PRIMARY CARE CLINIC | Age: 87
End: 2022-05-03
Payer: COMMERCIAL

## 2022-05-03 VITALS
TEMPERATURE: 97.8 F | BODY MASS INDEX: 28.05 KG/M2 | WEIGHT: 148.6 LBS | DIASTOLIC BLOOD PRESSURE: 77 MMHG | HEART RATE: 92 BPM | SYSTOLIC BLOOD PRESSURE: 139 MMHG | HEIGHT: 61 IN | OXYGEN SATURATION: 98 % | RESPIRATION RATE: 18 BRPM

## 2022-05-03 DIAGNOSIS — J30.9 ALLERGIC RHINITIS, UNSPECIFIED SEASONALITY, UNSPECIFIED TRIGGER: ICD-10-CM

## 2022-05-03 DIAGNOSIS — I25.10 CORONARY ARTERY DISEASE INVOLVING NATIVE CORONARY ARTERY OF NATIVE HEART WITHOUT ANGINA PECTORIS: ICD-10-CM

## 2022-05-03 DIAGNOSIS — F03.90 SENILE DEMENTIA WITHOUT BEHAVIORAL DISTURBANCE (HCC): Primary | ICD-10-CM

## 2022-05-03 DIAGNOSIS — M15.9 PRIMARY OSTEOARTHRITIS INVOLVING MULTIPLE JOINTS: ICD-10-CM

## 2022-05-03 DIAGNOSIS — I10 ESSENTIAL HYPERTENSION: ICD-10-CM

## 2022-05-03 PROCEDURE — 1123F ACP DISCUSS/DSCN MKR DOCD: CPT | Performed by: PHYSICIAN ASSISTANT

## 2022-05-03 PROCEDURE — G8417 CALC BMI ABV UP PARAM F/U: HCPCS | Performed by: PHYSICIAN ASSISTANT

## 2022-05-03 PROCEDURE — 1090F PRES/ABSN URINE INCON ASSESS: CPT | Performed by: PHYSICIAN ASSISTANT

## 2022-05-03 RX ORDER — LORAZEPAM 0.5 MG/1
0.5 TABLET ORAL DAILY PRN
COMMUNITY
Start: 2021-11-12 | End: 2022-08-26 | Stop reason: SDUPTHER

## 2022-05-03 RX ORDER — IPRATROPIUM BROMIDE 21 UG/1
2 SPRAY, METERED NASAL 3 TIMES DAILY PRN
Qty: 30 ML | Refills: 5 | Status: SHIPPED
Start: 2022-05-03 | End: 2022-11-02 | Stop reason: DRUGHIGH

## 2022-05-03 ASSESSMENT — PATIENT HEALTH QUESTIONNAIRE - PHQ9
SUM OF ALL RESPONSES TO PHQ QUESTIONS 1-9: 2
SUM OF ALL RESPONSES TO PHQ9 QUESTIONS 1 & 2: 2
SUM OF ALL RESPONSES TO PHQ QUESTIONS 1-9: 2
SUM OF ALL RESPONSES TO PHQ QUESTIONS 1-9: 2
1. LITTLE INTEREST OR PLEASURE IN DOING THINGS: 1
SUM OF ALL RESPONSES TO PHQ QUESTIONS 1-9: 2
2. FEELING DOWN, DEPRESSED OR HOPELESS: 1

## 2022-05-04 LAB
ANION GAP SERPL CALCULATED.3IONS-SCNC: 12 MMOL/L (ref 7–16)
BUN BLDV-MCNC: 15 MG/DL (ref 6–23)
CALCIUM SERPL-MCNC: 9 MG/DL (ref 8.6–10.2)
CHLORIDE BLD-SCNC: 104 MMOL/L (ref 98–107)
CO2: 25 MMOL/L (ref 22–29)
CREAT SERPL-MCNC: 0.9 MG/DL (ref 0.5–1)
GFR AFRICAN AMERICAN: >60
GFR NON-AFRICAN AMERICAN: 59 ML/MIN/1.73
GLUCOSE BLD-MCNC: 87 MG/DL (ref 74–99)
POTASSIUM SERPL-SCNC: 4.3 MMOL/L (ref 3.5–5)
SODIUM BLD-SCNC: 141 MMOL/L (ref 132–146)

## 2022-05-31 ENCOUNTER — OFFICE VISIT (OUTPATIENT)
Dept: PRIMARY CARE CLINIC | Age: 87
End: 2022-05-31
Payer: COMMERCIAL

## 2022-05-31 VITALS
DIASTOLIC BLOOD PRESSURE: 82 MMHG | HEART RATE: 93 BPM | BODY MASS INDEX: 28.05 KG/M2 | HEIGHT: 61 IN | RESPIRATION RATE: 18 BRPM | OXYGEN SATURATION: 96 % | TEMPERATURE: 98.5 F | SYSTOLIC BLOOD PRESSURE: 134 MMHG | WEIGHT: 148.6 LBS

## 2022-05-31 DIAGNOSIS — M43.16 SPONDYLOLISTHESIS OF LUMBAR REGION: ICD-10-CM

## 2022-05-31 DIAGNOSIS — I10 ESSENTIAL HYPERTENSION: ICD-10-CM

## 2022-05-31 DIAGNOSIS — J30.9 ALLERGIC RHINITIS, UNSPECIFIED SEASONALITY, UNSPECIFIED TRIGGER: ICD-10-CM

## 2022-05-31 DIAGNOSIS — R26.2 DIFFICULTY WALKING: ICD-10-CM

## 2022-05-31 DIAGNOSIS — I25.10 CORONARY ARTERY DISEASE INVOLVING NATIVE CORONARY ARTERY OF NATIVE HEART WITHOUT ANGINA PECTORIS: ICD-10-CM

## 2022-05-31 DIAGNOSIS — F03.90 SENILE DEMENTIA WITHOUT BEHAVIORAL DISTURBANCE (HCC): Primary | ICD-10-CM

## 2022-05-31 DIAGNOSIS — M15.9 PRIMARY OSTEOARTHRITIS INVOLVING MULTIPLE JOINTS: ICD-10-CM

## 2022-05-31 PROCEDURE — 1090F PRES/ABSN URINE INCON ASSESS: CPT | Performed by: PHYSICIAN ASSISTANT

## 2022-05-31 PROCEDURE — 1123F ACP DISCUSS/DSCN MKR DOCD: CPT | Performed by: PHYSICIAN ASSISTANT

## 2022-05-31 PROCEDURE — G8417 CALC BMI ABV UP PARAM F/U: HCPCS | Performed by: PHYSICIAN ASSISTANT

## 2022-05-31 RX ORDER — HYDROCODONE BITARTRATE AND ACETAMINOPHEN 5; 325 MG/1; MG/1
TABLET ORAL
Qty: 90 TABLET | Refills: 0 | Status: SHIPPED | OUTPATIENT
Start: 2022-05-31 | End: 2022-06-30

## 2022-05-31 RX ORDER — SERTRALINE HYDROCHLORIDE 25 MG/1
TABLET, FILM COATED ORAL
COMMUNITY
Start: 2022-05-05
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
## 2022-06-06 LAB — SOURCE: NORMAL

## 2022-06-07 LAB — SARS-COV-2, PCR: NOT DETECTED

## 2022-06-09 LAB — SOURCE: NORMAL

## 2022-06-10 LAB — SARS-COV-2, PCR: NOT DETECTED

## 2022-06-12 LAB — SOURCE: NORMAL

## 2022-06-14 LAB — SARS-COV-2, PCR: NOT DETECTED

## 2022-06-15 LAB — SOURCE: NORMAL

## 2022-06-17 LAB
SARS-COV-2, PCR: NOT DETECTED
SOURCE: NORMAL

## 2022-06-21 LAB — SARS-COV-2, PCR: NOT DETECTED

## 2022-06-25 LAB
SARS-COV-2: NOT DETECTED
SOURCE: NORMAL

## 2022-06-27 ENCOUNTER — OFFICE VISIT (OUTPATIENT)
Dept: PRIMARY CARE CLINIC | Age: 87
End: 2022-06-27
Payer: COMMERCIAL

## 2022-06-27 VITALS
SYSTOLIC BLOOD PRESSURE: 126 MMHG | OXYGEN SATURATION: 97 % | BODY MASS INDEX: 28.21 KG/M2 | HEIGHT: 61 IN | WEIGHT: 149.4 LBS | HEART RATE: 97 BPM | TEMPERATURE: 98.2 F | RESPIRATION RATE: 20 BRPM | DIASTOLIC BLOOD PRESSURE: 74 MMHG

## 2022-06-27 DIAGNOSIS — R26.2 DIFFICULTY WALKING: ICD-10-CM

## 2022-06-27 DIAGNOSIS — F03.90 SENILE DEMENTIA WITHOUT BEHAVIORAL DISTURBANCE (HCC): Primary | ICD-10-CM

## 2022-06-27 DIAGNOSIS — I25.10 CORONARY ARTERY DISEASE INVOLVING NATIVE CORONARY ARTERY OF NATIVE HEART WITHOUT ANGINA PECTORIS: ICD-10-CM

## 2022-06-27 DIAGNOSIS — J30.9 ALLERGIC RHINITIS, UNSPECIFIED SEASONALITY, UNSPECIFIED TRIGGER: ICD-10-CM

## 2022-06-27 DIAGNOSIS — M15.9 PRIMARY OSTEOARTHRITIS INVOLVING MULTIPLE JOINTS: ICD-10-CM

## 2022-06-27 DIAGNOSIS — I10 ESSENTIAL HYPERTENSION: ICD-10-CM

## 2022-06-27 DIAGNOSIS — M43.16 SPONDYLOLISTHESIS OF LUMBAR REGION: ICD-10-CM

## 2022-06-27 PROCEDURE — 1090F PRES/ABSN URINE INCON ASSESS: CPT | Performed by: PHYSICIAN ASSISTANT

## 2022-06-27 PROCEDURE — G8417 CALC BMI ABV UP PARAM F/U: HCPCS | Performed by: PHYSICIAN ASSISTANT

## 2022-06-27 PROCEDURE — 1123F ACP DISCUSS/DSCN MKR DOCD: CPT | Performed by: PHYSICIAN ASSISTANT
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
## 2022-06-29 LAB
SARS-COV-2: NOT DETECTED
SOURCE: NORMAL
SOURCE: NORMAL

## 2022-06-30 LAB — SOURCE: NORMAL

## 2022-07-01 LAB — SARS-COV-2, PCR: NOT DETECTED

## 2022-07-05 DIAGNOSIS — M15.9 PRIMARY OSTEOARTHRITIS INVOLVING MULTIPLE JOINTS: ICD-10-CM

## 2022-07-05 DIAGNOSIS — M15.9 PRIMARY OSTEOARTHRITIS INVOLVING MULTIPLE JOINTS: Primary | ICD-10-CM

## 2022-07-05 DIAGNOSIS — M43.16 SPONDYLOLISTHESIS OF LUMBAR REGION: ICD-10-CM

## 2022-07-05 RX ORDER — HYDROCODONE BITARTRATE AND ACETAMINOPHEN 5; 325 MG/1; MG/1
TABLET ORAL
Qty: 90 TABLET | Refills: 0 | Status: SHIPPED
Start: 2022-07-05 | End: 2022-08-26 | Stop reason: SDUPTHER

## 2022-07-05 RX ORDER — HYDROCODONE BITARTRATE AND ACETAMINOPHEN 5; 325 MG/1; MG/1
TABLET ORAL
Qty: 90 TABLET | Refills: 0 | OUTPATIENT
Start: 2022-07-05 | End: 2022-08-04

## 2022-07-06 LAB — SARS-COV-2, PCR: NOT DETECTED

## 2022-07-07 LAB — SOURCE: NORMAL

## 2022-07-08 LAB — SARS-COV-2, PCR: NOT DETECTED

## 2022-07-13 LAB
SARS-COV-2: NOT DETECTED
SOURCE: NORMAL

## 2022-07-14 LAB — SOURCE: NORMAL

## 2022-07-16 LAB
SARS-COV-2: NOT DETECTED
SOURCE: NORMAL

## 2022-07-19 LAB — SARS-COV-2, PCR: NOT DETECTED

## 2022-07-22 LAB
SARS-COV-2: NOT DETECTED
SOURCE: NORMAL
SOURCE: NORMAL

## 2022-07-25 ENCOUNTER — OFFICE VISIT (OUTPATIENT)
Dept: PRIMARY CARE CLINIC | Age: 87
End: 2022-07-25
Payer: COMMERCIAL

## 2022-07-25 VITALS
WEIGHT: 150.8 LBS | BODY MASS INDEX: 28.47 KG/M2 | HEART RATE: 86 BPM | TEMPERATURE: 96.9 F | HEIGHT: 61 IN | SYSTOLIC BLOOD PRESSURE: 128 MMHG | OXYGEN SATURATION: 95 % | DIASTOLIC BLOOD PRESSURE: 65 MMHG | RESPIRATION RATE: 16 BRPM

## 2022-07-25 DIAGNOSIS — I25.10 CORONARY ARTERY DISEASE INVOLVING NATIVE CORONARY ARTERY OF NATIVE HEART WITHOUT ANGINA PECTORIS: ICD-10-CM

## 2022-07-25 DIAGNOSIS — J30.9 ALLERGIC RHINITIS, UNSPECIFIED SEASONALITY, UNSPECIFIED TRIGGER: ICD-10-CM

## 2022-07-25 DIAGNOSIS — M43.16 SPONDYLOLISTHESIS OF LUMBAR REGION: ICD-10-CM

## 2022-07-25 DIAGNOSIS — S90.31XA CONTUSION OF RIGHT FOOT, INITIAL ENCOUNTER: ICD-10-CM

## 2022-07-25 DIAGNOSIS — R26.2 DIFFICULTY WALKING: ICD-10-CM

## 2022-07-25 DIAGNOSIS — I10 ESSENTIAL HYPERTENSION: ICD-10-CM

## 2022-07-25 DIAGNOSIS — F03.90 SENILE DEMENTIA WITHOUT BEHAVIORAL DISTURBANCE (HCC): Primary | ICD-10-CM

## 2022-07-25 DIAGNOSIS — M15.9 PRIMARY OSTEOARTHRITIS INVOLVING MULTIPLE JOINTS: ICD-10-CM

## 2022-07-25 PROCEDURE — 1123F ACP DISCUSS/DSCN MKR DOCD: CPT | Performed by: PHYSICIAN ASSISTANT

## 2022-07-25 PROCEDURE — G8417 CALC BMI ABV UP PARAM F/U: HCPCS | Performed by: PHYSICIAN ASSISTANT

## 2022-07-25 PROCEDURE — 1090F PRES/ABSN URINE INCON ASSESS: CPT | Performed by: PHYSICIAN ASSISTANT
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
## 2022-07-26 LAB — SARS-COV-2, PCR: NOT DETECTED

## 2022-07-27 LAB — SOURCE: NORMAL

## 2022-07-29 LAB
SARS-COV-2, PCR: NOT DETECTED
SOURCE: NORMAL

## 2022-08-02 LAB — SARS-COV-2, PCR: NOT DETECTED

## 2022-08-03 LAB — SOURCE: NORMAL

## 2022-08-05 LAB — SARS-COV-2, PCR: NOT DETECTED

## 2022-08-10 LAB
SARS-COV-2: NOT DETECTED
SOURCE: NORMAL

## 2022-08-13 LAB
SARS-COV-2: NOT DETECTED
SOURCE: NORMAL

## 2022-08-16 ENCOUNTER — TELEPHONE (OUTPATIENT)
Dept: PRIMARY CARE CLINIC | Age: 87
End: 2022-08-16

## 2022-08-16 DIAGNOSIS — M15.9 PRIMARY OSTEOARTHRITIS INVOLVING MULTIPLE JOINTS: Primary | ICD-10-CM

## 2022-08-16 RX ORDER — ACETAMINOPHEN AND CODEINE PHOSPHATE 300; 30 MG/1; MG/1
1 TABLET ORAL EVERY 4 HOURS PRN
Qty: 40 TABLET | Refills: 2 | Status: SHIPPED
Start: 2022-08-16 | End: 2022-08-22

## 2022-08-16 NOTE — TELEPHONE ENCOUNTER
Fax from [phone] [employer] son called and asked if there is anything we can try for the pain she has in her toes/feet r/t arthritis? Possibly Tylenol arthritis?

## 2022-08-17 LAB
SARS-COV-2: NOT DETECTED
SOURCE: NORMAL

## 2022-08-19 LAB — SOURCE: NORMAL

## 2022-08-20 LAB
SARS-COV-2: NOT DETECTED
SOURCE: NORMAL
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
## 2022-08-22 ENCOUNTER — OFFICE VISIT (OUTPATIENT)
Dept: PRIMARY CARE CLINIC | Age: 87
End: 2022-08-22
Payer: COMMERCIAL

## 2022-08-22 ENCOUNTER — TELEPHONE (OUTPATIENT)
Dept: PRIMARY CARE CLINIC | Age: 87
End: 2022-08-22

## 2022-08-22 VITALS
OXYGEN SATURATION: 93 % | WEIGHT: 149.4 LBS | DIASTOLIC BLOOD PRESSURE: 74 MMHG | SYSTOLIC BLOOD PRESSURE: 117 MMHG | HEART RATE: 100 BPM | RESPIRATION RATE: 18 BRPM | TEMPERATURE: 96.7 F | BODY MASS INDEX: 28.21 KG/M2 | HEIGHT: 61 IN

## 2022-08-22 DIAGNOSIS — M15.9 PRIMARY OSTEOARTHRITIS INVOLVING MULTIPLE JOINTS: ICD-10-CM

## 2022-08-22 DIAGNOSIS — I10 ESSENTIAL HYPERTENSION: Primary | ICD-10-CM

## 2022-08-22 DIAGNOSIS — Z91.81 HISTORY OF FALLING: ICD-10-CM

## 2022-08-22 DIAGNOSIS — R26.2 DIFFICULTY WALKING: ICD-10-CM

## 2022-08-22 DIAGNOSIS — I25.10 CORONARY ARTERY DISEASE INVOLVING NATIVE CORONARY ARTERY OF NATIVE HEART WITHOUT ANGINA PECTORIS: ICD-10-CM

## 2022-08-22 DIAGNOSIS — M43.16 SPONDYLOLISTHESIS OF LUMBAR REGION: ICD-10-CM

## 2022-08-22 DIAGNOSIS — F03.90 SENILE DEMENTIA WITHOUT BEHAVIORAL DISTURBANCE (HCC): ICD-10-CM

## 2022-08-22 PROCEDURE — G8417 CALC BMI ABV UP PARAM F/U: HCPCS | Performed by: FAMILY MEDICINE

## 2022-08-22 PROCEDURE — 1090F PRES/ABSN URINE INCON ASSESS: CPT | Performed by: FAMILY MEDICINE

## 2022-08-22 PROCEDURE — 1123F ACP DISCUSS/DSCN MKR DOCD: CPT | Performed by: FAMILY MEDICINE

## 2022-08-22 RX ORDER — SENNOSIDES 8.6 MG
650 CAPSULE ORAL 4 TIMES DAILY PRN
Qty: 60 TABLET | Refills: 5 | Status: SHIPPED | OUTPATIENT
Start: 2022-08-22

## 2022-08-24 LAB
SARS-COV-2, PCR: NOT DETECTED
SOURCE: NORMAL

## 2022-08-26 DIAGNOSIS — F41.9 ANXIETY DISORDER, UNSPECIFIED TYPE: ICD-10-CM

## 2022-08-26 DIAGNOSIS — M15.9 PRIMARY OSTEOARTHRITIS INVOLVING MULTIPLE JOINTS: ICD-10-CM

## 2022-08-26 DIAGNOSIS — F03.90 SENILE DEMENTIA WITHOUT BEHAVIORAL DISTURBANCE (HCC): Primary | ICD-10-CM

## 2022-08-26 DIAGNOSIS — M43.16 SPONDYLOLISTHESIS OF LUMBAR REGION: ICD-10-CM

## 2022-08-26 LAB
ANION GAP SERPL CALCULATED.3IONS-SCNC: 12 MMOL/L (ref 7–16)
BUN BLDV-MCNC: 11 MG/DL (ref 6–23)
CALCIUM SERPL-MCNC: 9.5 MG/DL (ref 8.6–10.2)
CHLORIDE BLD-SCNC: 103 MMOL/L (ref 98–107)
CO2: 23 MMOL/L (ref 22–29)
CREAT SERPL-MCNC: 0.9 MG/DL (ref 0.5–1)
GFR AFRICAN AMERICAN: >60
GFR NON-AFRICAN AMERICAN: 59 ML/MIN/1.73
GLUCOSE BLD-MCNC: 92 MG/DL (ref 74–99)
POTASSIUM SERPL-SCNC: 4.4 MMOL/L (ref 3.5–5)
SARS-COV-2, PCR: NOT DETECTED
SODIUM BLD-SCNC: 138 MMOL/L (ref 132–146)
SOURCE: NORMAL

## 2022-08-26 RX ORDER — LORAZEPAM 0.5 MG/1
0.5 TABLET ORAL DAILY
Qty: 60 TABLET | Refills: 5 | Status: SHIPPED
Start: 2022-08-26 | End: 2022-08-26

## 2022-08-26 RX ORDER — LORAZEPAM 2 MG/1
TABLET ORAL
Qty: 29 TABLET | Refills: 5 | Status: SHIPPED | OUTPATIENT
Start: 2022-08-26 | End: 2022-09-25

## 2022-08-26 RX ORDER — LORAZEPAM 0.5 MG/1
TABLET ORAL
Qty: 30 TABLET | Refills: 5 | OUTPATIENT
Start: 2022-08-26

## 2022-08-26 RX ORDER — HYDROCODONE BITARTRATE AND ACETAMINOPHEN 5; 325 MG/1; MG/1
1 TABLET ORAL 2 TIMES DAILY
Qty: 90 TABLET | Refills: 0 | Status: SHIPPED | OUTPATIENT
Start: 2022-08-26 | End: 2022-09-01

## 2022-08-26 RX ORDER — LORAZEPAM 0.5 MG/1
TABLET ORAL
Qty: 30 TABLET | Refills: 4 | Status: SHIPPED | OUTPATIENT
Start: 2022-08-26 | End: 2022-09-25

## 2022-08-26 NOTE — TELEPHONE ENCOUNTER
I just talked to Carmela Salazar at [place]. Her Ativan order is 2mg daily straight and 0.5 mg daily prn. We don't have the 2mg on her list... can you please fix it?  thanks

## 2022-08-28 LAB — SOURCE: NORMAL

## 2022-08-30 LAB — SARS-COV-2, PCR: NOT DETECTED

## 2022-08-31 LAB — SOURCE: NORMAL

## 2022-09-01 DIAGNOSIS — M43.16 SPONDYLOLISTHESIS OF LUMBAR REGION: ICD-10-CM

## 2022-09-01 DIAGNOSIS — M15.9 PRIMARY OSTEOARTHRITIS INVOLVING MULTIPLE JOINTS: ICD-10-CM

## 2022-09-01 LAB — SARS-COV-2, PCR: NOT DETECTED

## 2022-09-01 RX ORDER — HYDROCODONE BITARTRATE AND ACETAMINOPHEN 5; 325 MG/1; MG/1
TABLET ORAL
Qty: 90 TABLET | Refills: 0 | Status: SHIPPED | OUTPATIENT
Start: 2022-09-01 | End: 2022-10-01

## 2022-09-03 LAB — SOURCE: NORMAL

## 2022-09-04 LAB — SOURCE: NORMAL

## 2022-09-07 LAB
SARS-COV-2, PCR: NOT DETECTED
SOURCE: NORMAL

## 2022-09-09 LAB — SARS-COV-2, PCR: NOT DETECTED

## 2022-09-11 LAB — SOURCE: NORMAL

## 2022-09-13 LAB — SARS-COV-2, PCR: NOT DETECTED

## 2022-09-14 LAB — SOURCE: NORMAL

## 2022-09-17 LAB
SARS-COV-2, PCR: ABNORMAL
SOURCE: NORMAL

## 2022-09-21 LAB — SARS-COV-2, PCR: NOT DETECTED

## 2022-09-23 ENCOUNTER — OFFICE VISIT (OUTPATIENT)
Dept: PRIMARY CARE CLINIC | Age: 87
End: 2022-09-23
Payer: COMMERCIAL

## 2022-09-23 DIAGNOSIS — I25.10 CORONARY ARTERY DISEASE INVOLVING NATIVE CORONARY ARTERY OF NATIVE HEART WITHOUT ANGINA PECTORIS: ICD-10-CM

## 2022-09-23 DIAGNOSIS — Z91.81 HISTORY OF FALLING: ICD-10-CM

## 2022-09-23 DIAGNOSIS — I10 ESSENTIAL HYPERTENSION: Primary | ICD-10-CM

## 2022-09-23 DIAGNOSIS — M43.16 SPONDYLOLISTHESIS OF LUMBAR REGION: ICD-10-CM

## 2022-09-23 DIAGNOSIS — F03.90 SENILE DEMENTIA WITHOUT BEHAVIORAL DISTURBANCE (HCC): ICD-10-CM

## 2022-09-23 DIAGNOSIS — M15.9 PRIMARY OSTEOARTHRITIS INVOLVING MULTIPLE JOINTS: ICD-10-CM

## 2022-09-23 DIAGNOSIS — R26.2 DIFFICULTY WALKING: ICD-10-CM

## 2022-09-23 PROCEDURE — G8417 CALC BMI ABV UP PARAM F/U: HCPCS | Performed by: PHYSICIAN ASSISTANT

## 2022-09-23 PROCEDURE — 1123F ACP DISCUSS/DSCN MKR DOCD: CPT | Performed by: PHYSICIAN ASSISTANT

## 2022-09-23 PROCEDURE — 1090F PRES/ABSN URINE INCON ASSESS: CPT | Performed by: PHYSICIAN ASSISTANT
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
## 2022-09-24 LAB
SARS-COV-2: NOT DETECTED
SOURCE: NORMAL

## 2022-09-26 LAB
BASOPHILS ABSOLUTE: 0.04 E9/L (ref 0–0.2)
BASOPHILS RELATIVE PERCENT: 0.6 % (ref 0–2)
EOSINOPHILS ABSOLUTE: 0.19 E9/L (ref 0.05–0.5)
EOSINOPHILS RELATIVE PERCENT: 2.7 % (ref 0–6)
HCT VFR BLD CALC: 34.7 % (ref 34–48)
HEMOGLOBIN: 11.2 G/DL (ref 11.5–15.5)
IMMATURE GRANULOCYTES #: 0.02 E9/L
IMMATURE GRANULOCYTES %: 0.3 % (ref 0–5)
LYMPHOCYTES ABSOLUTE: 2.13 E9/L (ref 1.5–4)
LYMPHOCYTES RELATIVE PERCENT: 29.8 % (ref 20–42)
MCH RBC QN AUTO: 27.3 PG (ref 26–35)
MCHC RBC AUTO-ENTMCNC: 32.3 % (ref 32–34.5)
MCV RBC AUTO: 84.4 FL (ref 80–99.9)
MONOCYTES ABSOLUTE: 1 E9/L (ref 0.1–0.95)
MONOCYTES RELATIVE PERCENT: 14 % (ref 2–12)
NEUTROPHILS ABSOLUTE: 3.77 E9/L (ref 1.8–7.3)
NEUTROPHILS RELATIVE PERCENT: 52.6 % (ref 43–80)
PDW BLD-RTO: 14.7 FL (ref 11.5–15)
PLATELET # BLD: 264 E9/L (ref 130–450)
PMV BLD AUTO: 10.6 FL (ref 7–12)
RBC # BLD: 4.11 E12/L (ref 3.5–5.5)
WBC # BLD: 7.2 E9/L (ref 4.5–11.5)

## 2022-09-28 LAB
SARS-COV-2: NOT DETECTED
SOURCE: NORMAL

## 2022-09-29 ENCOUNTER — TELEPHONE (OUTPATIENT)
Dept: PRIMARY CARE CLINIC | Age: 87
End: 2022-09-29

## 2022-09-29 RX ORDER — DEXTROMETHORPHAN HYDROBROMIDE, GUAIFENESIN 20; 200 MG/10ML; MG/10ML
SOLUTION ORAL
Qty: 236 ML | Refills: 11 | Status: SHIPPED | OUTPATIENT
Start: 2022-09-29

## 2022-09-29 NOTE — TELEPHONE ENCOUNTER
Fax from [facility].  June c/o emesis throughout the night. Temp 99.8, 99.4, and 98.2 after Tylenol. Rapid COVID-neg. Lungs normal for her. No SOB, no distress. Decreased appetite, increase in tiredness. Do you want a UA CS?  Please advise

## 2022-09-30 DIAGNOSIS — J06.9 UPPER RESPIRATORY TRACT INFECTION, UNSPECIFIED TYPE: Primary | ICD-10-CM

## 2022-09-30 LAB
BILIRUBIN URINE: NEGATIVE
BLOOD, URINE: NEGATIVE
CLARITY: CLEAR
COLOR: YELLOW
GLUCOSE URINE: NEGATIVE MG/DL
KETONES, URINE: NEGATIVE MG/DL
LEUKOCYTE ESTERASE, URINE: NEGATIVE
NITRITE, URINE: NEGATIVE
PH UA: 6 (ref 5–9)
PROTEIN UA: NEGATIVE MG/DL
SPECIFIC GRAVITY UA: 1.01 (ref 1–1.03)
UROBILINOGEN, URINE: 0.2 E.U./DL

## 2022-09-30 RX ORDER — ZINC GLUCONATE 2 [HP_X]/1
LOZENGE ORAL
Qty: 20 LOZENGE | Refills: 5 | Status: SHIPPED | OUTPATIENT
Start: 2022-09-30

## 2022-09-30 NOTE — PROGRESS NOTES
9/30/22    [facility] nursing says patient having URI with cough symptoms - requesting rx for Cold-Eeze

## 2022-10-01 LAB
SARS-COV-2: NOT DETECTED
SOURCE: NORMAL

## 2022-10-02 LAB — URINE CULTURE, ROUTINE: NORMAL

## 2022-10-13 DIAGNOSIS — M54.2 NECK PAIN: ICD-10-CM

## 2022-10-13 DIAGNOSIS — M15.9 PRIMARY OSTEOARTHRITIS INVOLVING MULTIPLE JOINTS: Primary | ICD-10-CM

## 2022-10-13 DIAGNOSIS — M43.16 SPONDYLOLISTHESIS OF LUMBAR REGION: ICD-10-CM

## 2022-10-13 RX ORDER — DILTIAZEM HYDROCHLORIDE 120 MG/1
CAPSULE, COATED, EXTENDED RELEASE ORAL
Qty: 31 CAPSULE | Refills: 10 | Status: SHIPPED | OUTPATIENT
Start: 2022-10-13

## 2022-10-13 RX ORDER — LISINOPRIL 10 MG/1
TABLET ORAL
Qty: 62 TABLET | Refills: 10 | Status: SHIPPED | OUTPATIENT
Start: 2022-10-13

## 2022-10-13 RX ORDER — ROPINIROLE 2 MG/1
TABLET, FILM COATED ORAL
Qty: 31 TABLET | Refills: 10 | Status: SHIPPED | OUTPATIENT
Start: 2022-10-13

## 2022-10-13 RX ORDER — HYDROCODONE BITARTRATE AND ACETAMINOPHEN 5; 325 MG/1; MG/1
TABLET ORAL
Qty: 90 TABLET | Refills: 0 | Status: SHIPPED | OUTPATIENT
Start: 2022-10-13 | End: 2022-11-12

## 2022-10-31 DIAGNOSIS — F41.9 ANXIETY DISORDER, UNSPECIFIED TYPE: Primary | ICD-10-CM

## 2022-11-01 RX ORDER — LORAZEPAM 0.5 MG/1
TABLET ORAL
Qty: 30 TABLET | Refills: 5 | Status: SHIPPED | OUTPATIENT
Start: 2022-11-01 | End: 2023-04-30
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
## 2022-11-02 ENCOUNTER — OFFICE VISIT (OUTPATIENT)
Dept: PRIMARY CARE CLINIC | Age: 87
End: 2022-11-02
Payer: COMMERCIAL

## 2022-11-02 VITALS
HEIGHT: 61 IN | OXYGEN SATURATION: 95 % | SYSTOLIC BLOOD PRESSURE: 112 MMHG | DIASTOLIC BLOOD PRESSURE: 70 MMHG | HEART RATE: 100 BPM | WEIGHT: 146 LBS | TEMPERATURE: 98.3 F | RESPIRATION RATE: 18 BRPM | BODY MASS INDEX: 27.56 KG/M2

## 2022-11-02 VITALS
HEIGHT: 61 IN | SYSTOLIC BLOOD PRESSURE: 112 MMHG | DIASTOLIC BLOOD PRESSURE: 70 MMHG | RESPIRATION RATE: 18 BRPM | WEIGHT: 146 LBS | OXYGEN SATURATION: 95 % | HEART RATE: 100 BPM | TEMPERATURE: 98.3 F | BODY MASS INDEX: 27.56 KG/M2

## 2022-11-02 DIAGNOSIS — M15.9 PRIMARY OSTEOARTHRITIS INVOLVING MULTIPLE JOINTS: ICD-10-CM

## 2022-11-02 DIAGNOSIS — F03.90 SENILE DEMENTIA WITHOUT BEHAVIORAL DISTURBANCE (HCC): Primary | ICD-10-CM

## 2022-11-02 DIAGNOSIS — M43.16 SPONDYLOLISTHESIS OF LUMBAR REGION: ICD-10-CM

## 2022-11-02 DIAGNOSIS — I25.10 CORONARY ARTERY DISEASE INVOLVING NATIVE CORONARY ARTERY OF NATIVE HEART WITHOUT ANGINA PECTORIS: ICD-10-CM

## 2022-11-02 DIAGNOSIS — J30.9 ALLERGIC RHINITIS, UNSPECIFIED SEASONALITY, UNSPECIFIED TRIGGER: ICD-10-CM

## 2022-11-02 DIAGNOSIS — K59.09 OTHER CONSTIPATION: ICD-10-CM

## 2022-11-02 DIAGNOSIS — Z23 NEED FOR INFLUENZA VACCINATION: ICD-10-CM

## 2022-11-02 DIAGNOSIS — I10 ESSENTIAL HYPERTENSION: ICD-10-CM

## 2022-11-02 DIAGNOSIS — R26.2 DIFFICULTY WALKING: ICD-10-CM

## 2022-11-02 DIAGNOSIS — F03.90 SENILE DEMENTIA WITHOUT BEHAVIORAL DISTURBANCE (HCC): ICD-10-CM

## 2022-11-02 DIAGNOSIS — Z91.81 HISTORY OF FALLING: ICD-10-CM

## 2022-11-02 DIAGNOSIS — Z00.00 ENCOUNTER FOR ANNUAL WELLNESS VISIT (AWV) IN MEDICARE PATIENT: Primary | ICD-10-CM

## 2022-11-02 DIAGNOSIS — F41.9 ANXIETY DISORDER, UNSPECIFIED TYPE: ICD-10-CM

## 2022-11-02 PROCEDURE — 1090F PRES/ABSN URINE INCON ASSESS: CPT | Performed by: FAMILY MEDICINE

## 2022-11-02 PROCEDURE — G8417 CALC BMI ABV UP PARAM F/U: HCPCS | Performed by: FAMILY MEDICINE

## 2022-11-02 PROCEDURE — G0439 PPPS, SUBSEQ VISIT: HCPCS | Performed by: FAMILY MEDICINE

## 2022-11-02 PROCEDURE — G8484 FLU IMMUNIZE NO ADMIN: HCPCS | Performed by: FAMILY MEDICINE

## 2022-11-02 PROCEDURE — 1123F ACP DISCUSS/DSCN MKR DOCD: CPT | Performed by: FAMILY MEDICINE

## 2022-11-02 PROCEDURE — 90694 VACC AIIV4 NO PRSRV 0.5ML IM: CPT | Performed by: FAMILY MEDICINE

## 2022-11-02 PROCEDURE — G0008 ADMIN INFLUENZA VIRUS VAC: HCPCS | Performed by: FAMILY MEDICINE

## 2022-11-02 RX ORDER — LORAZEPAM 2 MG/1
1 TABLET ORAL
COMMUNITY
Start: 2022-10-29

## 2022-11-02 RX ORDER — IPRATROPIUM BROMIDE 21 UG/1
2 SPRAY, METERED NASAL 3 TIMES DAILY
Qty: 30 ML | Refills: 5 | Status: SHIPPED
Start: 2022-11-02

## 2022-11-02 RX ORDER — DOCUSATE SODIUM 100 MG/1
100 CAPSULE, LIQUID FILLED ORAL 3 TIMES DAILY
Qty: 93 CAPSULE | Refills: 11 | Status: SHIPPED | OUTPATIENT
Start: 2022-11-02

## 2022-11-02 SDOH — ECONOMIC STABILITY: FOOD INSECURITY: WITHIN THE PAST 12 MONTHS, YOU WORRIED THAT YOUR FOOD WOULD RUN OUT BEFORE YOU GOT MONEY TO BUY MORE.: NEVER TRUE

## 2022-11-02 SDOH — ECONOMIC STABILITY: FOOD INSECURITY: WITHIN THE PAST 12 MONTHS, THE FOOD YOU BOUGHT JUST DIDN'T LAST AND YOU DIDN'T HAVE MONEY TO GET MORE.: NEVER TRUE

## 2022-11-02 ASSESSMENT — PATIENT HEALTH QUESTIONNAIRE - PHQ9
1. LITTLE INTEREST OR PLEASURE IN DOING THINGS: 0
2. FEELING DOWN, DEPRESSED OR HOPELESS: 0
SUM OF ALL RESPONSES TO PHQ QUESTIONS 1-9: 0
SUM OF ALL RESPONSES TO PHQ9 QUESTIONS 1 & 2: 0

## 2022-11-02 ASSESSMENT — LIFESTYLE VARIABLES: HOW MANY STANDARD DRINKS CONTAINING ALCOHOL DO YOU HAVE ON A TYPICAL DAY: PATIENT DOES NOT DRINK

## 2022-11-02 ASSESSMENT — SOCIAL DETERMINANTS OF HEALTH (SDOH): HOW HARD IS IT FOR YOU TO PAY FOR THE VERY BASICS LIKE FOOD, HOUSING, MEDICAL CARE, AND HEATING?: NOT HARD AT ALL

## 2022-11-02 NOTE — PROGRESS NOTES
Medicare Annual Wellness Visit    Breanna Luz is here for Medicare AWV    Assessment & Plan   Encounter for annual wellness visit (AWV) in Medicare patient  Coronary artery disease involving native coronary artery of native heart without angina pectoris  Essential hypertension  Senile dementia without behavioral disturbance (HCC)  Allergic rhinitis, unspecified seasonality, unspecified trigger  Primary osteoarthritis involving multiple joints  Spondylolisthesis of lumbar region  History of falling  Other constipation  Anxiety disorder, unspecified type  Difficulty walking    Recommendations for Preventive Services Due: see orders and patient instructions/AVS.  Recommended screening schedule for the next 5-10 years is provided to the patient in written form: see Patient Instructions/AVS.     Return in 1 year (on 11/2/2023) for Medicare Annual Wellness Visit in 1 year. Subjective     Patient's complete Health Risk Assessment and screening values have been reviewed and are found in Flowsheets. The following problems were reviewed today and where indicated follow up appointments were made and/or referrals ordered. Positive Risk Factor Screenings with Interventions:    Fall Risk:      Fall Risk Interventions:    Home exercises provided to promote strength and balance    Cognitive: Words recalled: 2 Words Recalled  Clock Drawing Test (CDT): (!) Abnormal  Total Score Interpretation: Abnormal Mini-Cog  Did the patient refuse to take the cognition test?: No  Cognitive Impairment Interventions:  Patient lives in protected environment at [facility] and has all her needs cared for. Opioid Risk: (Low risk score <55) Opioid risk score: 13    Patient is low risk for opioid use disorder or overdose.   Last PDMP [facility] as Reviewed:  Review User Review Instant Review Result   KAY KOLB 11/1/2022  8:20 AM     Reviewed PDMP [1]       Health Habits/Nutrition:  Physical Activity: Insufficiently Active    Days of Exercise per Week: 6 days    Minutes of Exercise per Session: 10 min     Have you lost any weight without trying in the past 3 months?: No  Body mass index: (!) 27.58  Have you seen the dentist within the past year?: (!) No  Health Habits/Nutrition Interventions:  Dental exam overdue:  patient encouraged to make appointment with his/her dentist    Hearing/Vision:  Do you or your family notice any trouble with your hearing that hasn't been managed with hearing aids?: (!) Yes  Do you have difficulty driving, watching TV, or doing any of your daily activities because of your eyesight?: No  Have you had an eye exam within the past year?: (!) No  No results found. Hearing/Vision Interventions:  Hearing concerns:  patient declines any further evaluation/treatment for hearing issues  Vision concerns:  patient encouraged to make appointment with his/her eye specialist     ADLs:  In the past 7 days, did you need help from others to perform any of the following everyday activities: Eating, dressing, grooming, bathing, toileting, or walking/balance?: (!) Yes  Select all that apply: (!) Walking/Balance, Bathing  In the past 7 days, did you need help from others to take care of any of the following: Laundry, housekeeping, banking/finances, shopping, telephone use, food preparation, transportation, or taking medications?: (!) Yes  Select all that apply: (!) Taking Medications, Transportation, Laundry, Housekeeping, Banking/Finances, Shopping, Food Preparation  ADL Interventions:  Patient lives in protected environment at [address] and has all her needs cared for. Objective   Vitals:    11/02/22 0845   BP: 112/70   Site: Right Wrist   Position: Sitting   Pulse: 100   Resp: 18   Temp: 98.3 °F (36.8 °C)   TempSrc: Infrared   SpO2: 95%   Weight: 146 lb (66.2 kg)   Height: 5' 1\" (1.549 m)      Body mass index is 27.59 kg/m². No Known Allergies  Prior to Visit Medications    Medication Sig Taking?  Authorizing Provider BISACODYL 5 MG EC tablet TAKE 2 TABLETS BY MOUTH DAILY AS NEEDED **DO NOT CRUSH**  Coco An MD   LORazepam (ATIVAN) 2 MG tablet Take 1 tablet by mouth daily (with breakfast). Historical Provider, MD   ipratropium (ATROVENT) 0.03 % nasal spray 2 sprays by Each Nostril route 3 times daily  Coco An MD   docusate sodium (COLACE) 100 MG capsule Take 1 capsule by mouth in the morning, at noon, and at bedtime  Coco An MD   LORazepam (ATIVAN) 0.5 MG tablet TAKE 1 TABLET BY MOUTH DAILY AS NEEDED  Coco An MD   rOPINIRole (REQUIP) 2 MG tablet TAKE 1 TABLET BY MOUTH DAILY AT BEDTIME  Coco An MD   dilTIAZem (CARDIZEM CD) 120 MG extended release capsule TAKE 1 CAPSULE BY MOUTH DAILY AT BEDTIME  Coco An MD   lisinopril (PRINIVIL;ZESTRIL) 10 MG tablet TAKE 1 TABLET BY MOUTH TWICE DAILY  Coco An MD   HYDROcodone-acetaminophen (NORCO) 5-325 MG per tablet TAKE 1 TABLET BY MOUTH 2 TIMES DAILY AND 1 TAB DAILY AS NEEDED  Coco An MD   Homeopathic Products (COLD-EEZE) LOZG Take one lozenge every 2-3 hours x 1 to 2 days. May keep at Bedside.   KEN Medina TUSSIN -10 MG/5ML SYRP TAKE 10ML BY MOUTH EVERY 4 HOURS AS NEEDED  Coco An MD   acetaminophen (TYLENOL) 650 MG extended release tablet Take 1 tablet by mouth 4 times daily as needed for Pain  Coco An MD   sertraline (ZOLOFT) 25 MG tablet TAKE 1 TABLET BY MOUTH EVERY EVENING  Historical Provider, MD   oxymetazoline (12 HOUR NASAL SPRAY) 0.05 % nasal spray 2 sprays by Nasal route 2 times daily as needed for Congestion  Coco An MD   ASPIRIN LOW DOSE 81 MG EC tablet TAKE 1 TABLET BY MOUTH ONCE DAILY **CYC  Coco An MD   loratadine (CLARITIN) 10 MG tablet Take 1 tablet by mouth every evening  Coco An MD   acetaminophen (TYLENOL) 325 MG tablet Take 650 mg by mouth every 4 hours as needed for Pain  Historical Provider, MD   trolamine salicylate (ASPERCREME) 10 % cream Apply 1 Tube topically 2 times daily as needed for Pain Apply topically as needed. Historical Provider, MD   loperamide (IMODIUM A-D) 2 MG tablet Take 4 mg by mouth 3 times daily as needed for Diarrhea  Historical Provider, MD   magnesium hydroxide (MILK OF MAGNESIA) 400 MG/5ML suspension Take 30 mLs by mouth daily as needed for Constipation  Historical Provider, MD   neomycin-bacitracin-polymyxin (NEOSPORIN) 400-5-5000 ointment Apply topically as needed Apply topically prn  Historical Provider, MD   bismuth subsalicylate (PEPTO BISMOL) 262 MG/15ML suspension Take 30 mLs by mouth 3 times daily as needed for Indigestion  Historical Provider, MD   phenylephrine-mineral oil-petrolatum 0.25-14-71.9 % rectal ointment Place 1 each rectally 3 times daily as needed for Hemorrhoids   Historical Provider, MD   Camphor-Menthol-Methyl Sal 3.1-6-10 % PTCH Apply 1 patch topically daily as needed Apply to neck/shoulder topically every 24 hrs as needed for pain .  On AM/Off HS  Historical Provider, MD   calcium carbonate (TUMS) 500 MG chewable tablet Take 1 tablet by mouth 3 times daily as needed for Heartburn  Historical Provider, MD   acetaminophen (TYLENOL) 325 MG tablet Take 650 mg by mouth every 4 hours as needed for Pain or Fever (or fever)   Historical Provider, MD   ondansetron (ZOFRAN) 4 MG tablet Take 4 mg by mouth every 8 hours as needed for Nausea or Vomiting  Historical Provider, MD Siu (Including outside providers/suppliers regularly involved in providing care):   Patient Care Team:  Tyesha Whiteside MD as PCP - General ([facility])  Tyesha Whiteside MD as PCP - [address] [place] [address] Dr Echeverria Provider     Reviewed and updated this visit:  Tobacco  Allergies  Meds  Problems  Med Hx  Surg Hx  Soc Hx  Fam Hx

## 2022-11-03 LAB
ALBUMIN SERPL-MCNC: 3.8 G/DL (ref 3.5–5.2)
ALP BLD-CCNC: 86 U/L (ref 35–104)
ALT SERPL-CCNC: 13 U/L (ref 0–32)
ANION GAP SERPL CALCULATED.3IONS-SCNC: 12 MMOL/L (ref 7–16)
AST SERPL-CCNC: 15 U/L (ref 0–31)
BASOPHILS ABSOLUTE: 0.03 E9/L (ref 0–0.2)
BASOPHILS RELATIVE PERCENT: 0.4 % (ref 0–2)
BILIRUB SERPL-MCNC: 0.3 MG/DL (ref 0–1.2)
BUN BLDV-MCNC: 11 MG/DL (ref 6–23)
CALCIUM SERPL-MCNC: 9.4 MG/DL (ref 8.6–10.2)
CHLORIDE BLD-SCNC: 105 MMOL/L (ref 98–107)
CO2: 24 MMOL/L (ref 22–29)
CREAT SERPL-MCNC: 1 MG/DL (ref 0.5–1)
EOSINOPHILS ABSOLUTE: 0.22 E9/L (ref 0.05–0.5)
EOSINOPHILS RELATIVE PERCENT: 3.3 % (ref 0–6)
GFR SERPL CREATININE-BSD FRML MDRD: 53 ML/MIN/1.73
GLUCOSE BLD-MCNC: 98 MG/DL (ref 74–99)
HCT VFR BLD CALC: 34.9 % (ref 34–48)
HEMOGLOBIN: 11.2 G/DL (ref 11.5–15.5)
IMMATURE GRANULOCYTES #: 0.01 E9/L
IMMATURE GRANULOCYTES %: 0.1 % (ref 0–5)
LYMPHOCYTES ABSOLUTE: 2.33 E9/L (ref 1.5–4)
LYMPHOCYTES RELATIVE PERCENT: 34.8 % (ref 20–42)
MCH RBC QN AUTO: 27.5 PG (ref 26–35)
MCHC RBC AUTO-ENTMCNC: 32.1 % (ref 32–34.5)
MCV RBC AUTO: 85.7 FL (ref 80–99.9)
MONOCYTES ABSOLUTE: 0.92 E9/L (ref 0.1–0.95)
MONOCYTES RELATIVE PERCENT: 13.7 % (ref 2–12)
NEUTROPHILS ABSOLUTE: 3.19 E9/L (ref 1.8–7.3)
NEUTROPHILS RELATIVE PERCENT: 47.7 % (ref 43–80)
PDW BLD-RTO: 14.6 FL (ref 11.5–15)
PLATELET # BLD: 272 E9/L (ref 130–450)
PMV BLD AUTO: 10.4 FL (ref 7–12)
POTASSIUM SERPL-SCNC: 4.5 MMOL/L (ref 3.5–5)
RBC # BLD: 4.07 E12/L (ref 3.5–5.5)
SODIUM BLD-SCNC: 141 MMOL/L (ref 132–146)
TOTAL PROTEIN: 7 G/DL (ref 6.4–8.3)
TSH SERPL DL<=0.05 MIU/L-ACNC: 2.24 UIU/ML (ref 0.27–4.2)
WBC # BLD: 6.7 E9/L (ref 4.5–11.5)

## 2022-11-07 LAB
SARS-COV-2: NOT DETECTED
SOURCE: NORMAL

## 2022-11-08 RX ORDER — ROPINIROLE 1 MG/1
1 TABLET, FILM COATED ORAL NIGHTLY
Qty: 90 TABLET | Refills: 3 | Status: SHIPPED | OUTPATIENT
Start: 2022-11-08

## 2022-11-10 ENCOUNTER — TELEPHONE (OUTPATIENT)
Dept: PRIMARY CARE CLINIC | Age: 87
End: 2022-11-10

## 2022-11-11 LAB
SARS-COV-2: NOT DETECTED
SOURCE: NORMAL

## 2022-11-16 LAB
SARS-COV-2: NOT DETECTED
SOURCE: NORMAL

## 2022-11-18 ENCOUNTER — TELEPHONE (OUTPATIENT)
Dept: PRIMARY CARE CLINIC | Age: 87
End: 2022-11-18

## 2022-11-18 LAB
SARS-COV-2: DETECTED
SOURCE: ABNORMAL

## 2022-11-18 RX ORDER — ZINC GLUCONATE 2 [HP_X]/1
1 LOZENGE ORAL
Qty: 84 LOZENGE | Refills: 3 | Status: SHIPPED | OUTPATIENT
Start: 2022-11-18

## 2022-11-18 NOTE — TELEPHONE ENCOUNTER
Fax from [facility] 1947. June c/o lower back pain and pain behind L knee r/t recent fall. Having difficulty ambulating.  Please advise

## 2022-11-25 DIAGNOSIS — M15.9 PRIMARY OSTEOARTHRITIS INVOLVING MULTIPLE JOINTS: Primary | ICD-10-CM

## 2022-11-28 RX ORDER — HYDROCODONE BITARTRATE AND ACETAMINOPHEN 5; 325 MG/1; MG/1
TABLET ORAL
Qty: 90 TABLET | Refills: 0 | Status: SHIPPED | OUTPATIENT
Start: 2022-11-28 | End: 2022-12-28

## 2022-12-01 ENCOUNTER — TELEPHONE (OUTPATIENT)
Dept: PRIMARY CARE CLINIC | Age: 87
End: 2022-12-01

## 2022-12-01 PROBLEM — K59.00 CONSTIPATION: Status: ACTIVE | Noted: 2021-11-01

## 2022-12-01 NOTE — TELEPHONE ENCOUNTER
Fax from [facility] 1947. Breanna is requesting to keep Tylenol in her room for pain in the night.  Please advise

## 2022-12-02 ENCOUNTER — OFFICE VISIT (OUTPATIENT)
Dept: PRIMARY CARE CLINIC | Age: 87
End: 2022-12-02

## 2022-12-02 VITALS
WEIGHT: 145.2 LBS | HEIGHT: 61 IN | RESPIRATION RATE: 18 BRPM | OXYGEN SATURATION: 96 % | DIASTOLIC BLOOD PRESSURE: 78 MMHG | TEMPERATURE: 97.3 F | HEART RATE: 92 BPM | BODY MASS INDEX: 27.41 KG/M2 | SYSTOLIC BLOOD PRESSURE: 134 MMHG

## 2022-12-02 DIAGNOSIS — S90.31XA CONTUSION OF RIGHT FOOT, INITIAL ENCOUNTER: ICD-10-CM

## 2022-12-02 DIAGNOSIS — M43.16 SPONDYLOLISTHESIS OF LUMBAR REGION: ICD-10-CM

## 2022-12-02 DIAGNOSIS — J30.9 ALLERGIC RHINITIS, UNSPECIFIED SEASONALITY, UNSPECIFIED TRIGGER: ICD-10-CM

## 2022-12-02 DIAGNOSIS — K59.00 CONSTIPATION, UNSPECIFIED CONSTIPATION TYPE: ICD-10-CM

## 2022-12-02 DIAGNOSIS — F03.90 SENILE DEMENTIA WITHOUT BEHAVIORAL DISTURBANCE (HCC): Primary | ICD-10-CM

## 2022-12-02 DIAGNOSIS — I25.10 CORONARY ARTERY DISEASE INVOLVING NATIVE CORONARY ARTERY OF NATIVE HEART WITHOUT ANGINA PECTORIS: ICD-10-CM

## 2022-12-02 DIAGNOSIS — M15.9 PRIMARY OSTEOARTHRITIS INVOLVING MULTIPLE JOINTS: ICD-10-CM

## 2022-12-02 DIAGNOSIS — I10 ESSENTIAL HYPERTENSION: ICD-10-CM
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
## 2022-12-06 RX ORDER — DOCUSATE SODIUM 100 MG/1
CAPSULE, LIQUID FILLED ORAL
Qty: 31 CAPSULE | Refills: 11 | Status: SHIPPED | OUTPATIENT
Start: 2022-12-06

## 2022-12-14 ENCOUNTER — TELEPHONE (OUTPATIENT)
Dept: PRIMARY CARE CLINIC | Age: 87
End: 2022-12-14

## 2022-12-14 RX ORDER — TRAZODONE HYDROCHLORIDE 50 MG/1
50 TABLET ORAL
Qty: 30 TABLET | Refills: 5 | Status: SHIPPED | OUTPATIENT
Start: 2022-12-14

## 2022-12-14 NOTE — TELEPHONE ENCOUNTER
Fax from [facility]. June c/o being unable to sleep d/t being achy all over.  She takes Norco every am and pm. Please advise

## 2022-12-16 ENCOUNTER — TELEPHONE (OUTPATIENT)
Dept: PRIMARY CARE CLINIC | Age: 87
End: 2022-12-16

## 2022-12-16 NOTE — TELEPHONE ENCOUNTER
Trazodone 50 mg is the lowest dose it comes in. The highest dose it comes in is 300 mg. I have had patients take 1/2 tab of 50 mg with success, but that's if 50 mg makes them too sleepy during the day.   Did she try 50 mg?

## 2022-12-16 NOTE — TELEPHONE ENCOUNTER
Fax from [facility] 1947. Spoke with son, Andre Pinedo and he would like June to try the lowest dose Trazodone possible as needed.  He is concerned about falls

## 2022-12-27 LAB — SOURCE: NORMAL

## 2022-12-30 ENCOUNTER — OFFICE VISIT (OUTPATIENT)
Dept: PRIMARY CARE CLINIC | Age: 87
End: 2022-12-30

## 2022-12-30 VITALS
RESPIRATION RATE: 18 BRPM | HEIGHT: 61 IN | BODY MASS INDEX: 27.41 KG/M2 | DIASTOLIC BLOOD PRESSURE: 72 MMHG | OXYGEN SATURATION: 94 % | HEART RATE: 88 BPM | TEMPERATURE: 98.2 F | SYSTOLIC BLOOD PRESSURE: 128 MMHG | WEIGHT: 145.2 LBS

## 2022-12-30 DIAGNOSIS — J30.9 ALLERGIC RHINITIS, UNSPECIFIED SEASONALITY, UNSPECIFIED TRIGGER: ICD-10-CM

## 2022-12-30 DIAGNOSIS — I25.10 CORONARY ARTERY DISEASE INVOLVING NATIVE CORONARY ARTERY OF NATIVE HEART WITHOUT ANGINA PECTORIS: ICD-10-CM

## 2022-12-30 DIAGNOSIS — F03.90 SENILE DEMENTIA WITHOUT BEHAVIORAL DISTURBANCE (HCC): Primary | ICD-10-CM

## 2022-12-30 DIAGNOSIS — M15.9 PRIMARY OSTEOARTHRITIS INVOLVING MULTIPLE JOINTS: ICD-10-CM

## 2022-12-30 DIAGNOSIS — I10 ESSENTIAL HYPERTENSION: ICD-10-CM

## 2022-12-30 DIAGNOSIS — G47.00 INSOMNIA, UNSPECIFIED TYPE: ICD-10-CM

## 2022-12-30 DIAGNOSIS — K59.00 CONSTIPATION, UNSPECIFIED CONSTIPATION TYPE: ICD-10-CM

## 2022-12-30 RX ORDER — GUAIFENESIN AND DEXTROMETHORPHAN HYDROBROMIDE 100; 10 MG/5ML; MG/5ML
20 LIQUID ORAL EVERY 4 HOURS PRN
COMMUNITY
Start: 2022-09-29

## 2022-12-30 RX ORDER — HYDROCODONE BITARTRATE AND ACETAMINOPHEN 5; 325 MG/1; MG/1
1 TABLET ORAL 2 TIMES DAILY
Qty: 90 TABLET | Refills: 0 | Status: SHIPPED | OUTPATIENT
Start: 2022-12-30 | End: 2023-01-29
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
## 2023-01-11 RX ORDER — ASPIRIN 81 MG/1
TABLET, COATED ORAL
Qty: 31 TABLET | Refills: 11 | Status: SHIPPED | OUTPATIENT
Start: 2023-01-11

## 2023-01-12 RX ORDER — LORATADINE 10 MG/1
TABLET ORAL
Qty: 31 TABLET | Refills: 11 | Status: SHIPPED | OUTPATIENT
Start: 2023-01-12

## 2023-01-13 ENCOUNTER — TELEPHONE (OUTPATIENT)
Dept: PRIMARY CARE CLINIC | Age: 88
End: 2023-01-13

## 2023-01-13 NOTE — TELEPHONE ENCOUNTER
Fax from [facility] 1947. Breanna only wears KAY hose when she wants to and never all day. Pt teaching ineffective. Ok to change to PRN?

## 2023-01-26 DIAGNOSIS — F41.9 ANXIETY DISORDER, UNSPECIFIED TYPE: Primary | ICD-10-CM

## 2023-01-26 RX ORDER — LORAZEPAM 2 MG/1
2 TABLET ORAL DAILY
Qty: 30 TABLET | Refills: 5 | Status: SHIPPED | OUTPATIENT
Start: 2023-01-26 | End: 2023-07-25

## 2023-02-03 ENCOUNTER — OFFICE VISIT (OUTPATIENT)
Dept: PRIMARY CARE CLINIC | Age: 88
End: 2023-02-03
Payer: COMMERCIAL

## 2023-02-03 VITALS
TEMPERATURE: 97.7 F | OXYGEN SATURATION: 96 % | HEART RATE: 93 BPM | RESPIRATION RATE: 18 BRPM | WEIGHT: 146.2 LBS | HEIGHT: 61 IN | BODY MASS INDEX: 27.6 KG/M2

## 2023-02-03 DIAGNOSIS — J30.9 ALLERGIC RHINITIS, UNSPECIFIED SEASONALITY, UNSPECIFIED TRIGGER: ICD-10-CM

## 2023-02-03 DIAGNOSIS — K59.00 CONSTIPATION, UNSPECIFIED CONSTIPATION TYPE: ICD-10-CM

## 2023-02-03 DIAGNOSIS — F03.90 SENILE DEMENTIA WITHOUT BEHAVIORAL DISTURBANCE (HCC): Primary | ICD-10-CM

## 2023-02-03 DIAGNOSIS — I10 ESSENTIAL HYPERTENSION: ICD-10-CM

## 2023-02-03 DIAGNOSIS — I25.10 CORONARY ARTERY DISEASE INVOLVING NATIVE CORONARY ARTERY OF NATIVE HEART WITHOUT ANGINA PECTORIS: ICD-10-CM

## 2023-02-03 DIAGNOSIS — M15.9 PRIMARY OSTEOARTHRITIS INVOLVING MULTIPLE JOINTS: ICD-10-CM

## 2023-02-03 DIAGNOSIS — G47.00 INSOMNIA, UNSPECIFIED TYPE: ICD-10-CM

## 2023-02-03 PROCEDURE — G8417 CALC BMI ABV UP PARAM F/U: HCPCS | Performed by: PHYSICIAN ASSISTANT

## 2023-02-03 PROCEDURE — 1090F PRES/ABSN URINE INCON ASSESS: CPT | Performed by: PHYSICIAN ASSISTANT

## 2023-02-03 PROCEDURE — 99349 HOME/RES VST EST MOD MDM 40: CPT | Performed by: PHYSICIAN ASSISTANT

## 2023-02-03 PROCEDURE — 1123F ACP DISCUSS/DSCN MKR DOCD: CPT | Performed by: PHYSICIAN ASSISTANT

## 2023-02-03 PROCEDURE — 1036F TOBACCO NON-USER: CPT | Performed by: PHYSICIAN ASSISTANT

## 2023-02-03 PROCEDURE — G8484 FLU IMMUNIZE NO ADMIN: HCPCS | Performed by: PHYSICIAN ASSISTANT

## 2023-02-03 RX ORDER — ACETAMINOPHEN 325 MG/1
650 TABLET ORAL NIGHTLY
COMMUNITY
Start: 2022-12-21

## 2023-02-03 SDOH — ECONOMIC STABILITY: INCOME INSECURITY: HOW HARD IS IT FOR YOU TO PAY FOR THE VERY BASICS LIKE FOOD, HOUSING, MEDICAL CARE, AND HEATING?: NOT HARD AT ALL

## 2023-02-03 SDOH — ECONOMIC STABILITY: HOUSING INSECURITY
IN THE LAST 12 MONTHS, WAS THERE A TIME WHEN YOU DID NOT HAVE A STEADY PLACE TO SLEEP OR SLEPT IN A SHELTER (INCLUDING NOW)?: NO

## 2023-02-03 SDOH — ECONOMIC STABILITY: FOOD INSECURITY: WITHIN THE PAST 12 MONTHS, YOU WORRIED THAT YOUR FOOD WOULD RUN OUT BEFORE YOU GOT MONEY TO BUY MORE.: NEVER TRUE

## 2023-02-03 SDOH — ECONOMIC STABILITY: FOOD INSECURITY: WITHIN THE PAST 12 MONTHS, THE FOOD YOU BOUGHT JUST DIDN'T LAST AND YOU DIDN'T HAVE MONEY TO GET MORE.: NEVER TRUE

## 2023-02-03 ASSESSMENT — PATIENT HEALTH QUESTIONNAIRE - PHQ9
1. LITTLE INTEREST OR PLEASURE IN DOING THINGS: 0
2. FEELING DOWN, DEPRESSED OR HOPELESS: 0
SUM OF ALL RESPONSES TO PHQ QUESTIONS 1-9: 0
SUM OF ALL RESPONSES TO PHQ QUESTIONS 1-9: 0
SUM OF ALL RESPONSES TO PHQ9 QUESTIONS 1 & 2: 0
SUM OF ALL RESPONSES TO PHQ QUESTIONS 1-9: 0
SUM OF ALL RESPONSES TO PHQ QUESTIONS 1-9: 0
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
## 2023-02-06 LAB
ANION GAP SERPL CALCULATED.3IONS-SCNC: 11 MMOL/L (ref 7–16)
BUN BLDV-MCNC: 14 MG/DL (ref 6–23)
CALCIUM SERPL-MCNC: 9.2 MG/DL (ref 8.6–10.2)
CHLORIDE BLD-SCNC: 101 MMOL/L (ref 98–107)
CO2: 24 MMOL/L (ref 22–29)
CREAT SERPL-MCNC: 1 MG/DL (ref 0.5–1)
GFR SERPL CREATININE-BSD FRML MDRD: 53 ML/MIN/1.73
GLUCOSE BLD-MCNC: 105 MG/DL (ref 74–99)
POTASSIUM SERPL-SCNC: 4.2 MMOL/L (ref 3.5–5)
SODIUM BLD-SCNC: 136 MMOL/L (ref 132–146)

## 2023-02-10 ENCOUNTER — TELEPHONE (OUTPATIENT)
Dept: PRIMARY CARE CLINIC | Age: 88
End: 2023-02-10

## 2023-02-10 RX ORDER — MECLIZINE HYDROCHLORIDE 25 MG/1
25 TABLET ORAL 4 TIMES DAILY PRN
Qty: 30 TABLET | Refills: 5 | Status: SHIPPED | OUTPATIENT
Start: 2023-02-10

## 2023-02-10 NOTE — TELEPHONE ENCOUNTER
Fax from [facility] 1947. Breanna reports dizziness and nausea. /101, HR 97. Recheck 1 hr after meds, 172/97, HR 89. On Lisinopril 10mg BID. PRN zofran for nausea. Ok for Antivert 25mg q 6hrs prn dizziness? Please advise.

## 2023-02-13 DIAGNOSIS — M15.9 PRIMARY OSTEOARTHRITIS INVOLVING MULTIPLE JOINTS: ICD-10-CM

## 2023-02-13 RX ORDER — HYDROCODONE BITARTRATE AND ACETAMINOPHEN 5; 325 MG/1; MG/1
1 TABLET ORAL 2 TIMES DAILY
Qty: 90 TABLET | Refills: 0 | Status: SHIPPED | OUTPATIENT
Start: 2023-02-13 | End: 2023-03-15

## 2023-02-15 LAB
SARS-COV-2: NOT DETECTED
SOURCE: NORMAL

## 2023-02-18 LAB
SARS-COV-2: NOT DETECTED
SOURCE: NORMAL
SOURCE: NORMAL

## 2023-02-19 LAB — SOURCE: NORMAL

## 2023-02-24 LAB — SOURCE: NORMAL

## 2023-02-27 LAB — SARS-COV-2, PCR: NOT DETECTED

## 2023-03-01 ENCOUNTER — TELEPHONE (OUTPATIENT)
Dept: PRIMARY CARE CLINIC | Age: 88
End: 2023-03-01

## 2023-03-01 LAB — SOURCE: NORMAL

## 2023-03-01 NOTE — TELEPHONE ENCOUNTER
Fax from [facility] 1947. Breanna continues to c/o bilat foot pain even with Norco and Requip. Often takes PRN Norco. Son voices concerns, wants to know if there were other options? Possibly Gabapentin?

## 2023-03-02 LAB — SARS-COV-2, PCR: NOT DETECTED

## 2023-03-04 LAB — SOURCE: NORMAL

## 2023-03-07 LAB — SOURCE: NORMAL

## 2023-03-08 LAB — SARS-COV-2, PCR: DETECTED

## 2023-03-08 RX ORDER — SERTRALINE HYDROCHLORIDE 25 MG/1
TABLET, FILM COATED ORAL
Qty: 31 TABLET | Refills: 11 | Status: SHIPPED | OUTPATIENT
Start: 2023-03-08

## 2023-03-09 RX ORDER — LOPERAMIDE HYDROCHLORIDE 2 MG/1
4 TABLET ORAL 3 TIMES DAILY PRN
Qty: 90 TABLET | Refills: 5 | Status: SHIPPED | OUTPATIENT
Start: 2023-03-09

## 2023-03-13 ENCOUNTER — TELEPHONE (OUTPATIENT)
Dept: PRIMARY CARE CLINIC | Age: 88
End: 2023-03-13

## 2023-03-13 RX ORDER — ROPINIROLE 1 MG/1
1.5 TABLET, FILM COATED ORAL NIGHTLY
Qty: 90 TABLET | Refills: 3 | Status: SHIPPED
Start: 2023-03-13 | End: 2023-03-14

## 2023-03-13 NOTE — TELEPHONE ENCOUNTER
Fax from [facility]. Son called and want to know if requip could be increased. Her current dose is 1 mg.  Please advise

## 2023-03-14 RX ORDER — ROPINIROLE 1 MG/1
TABLET, FILM COATED ORAL
Qty: 31 TABLET | Refills: 11 | Status: SHIPPED | OUTPATIENT
Start: 2023-03-14

## 2023-03-14 RX ORDER — ROPINIROLE 0.5 MG/1
TABLET, FILM COATED ORAL
Qty: 31 TABLET | Refills: 11 | Status: SHIPPED | OUTPATIENT
Start: 2023-03-14
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
## 2023-03-15 ENCOUNTER — OFFICE VISIT (OUTPATIENT)
Dept: PRIMARY CARE CLINIC | Age: 88
End: 2023-03-15

## 2023-03-15 VITALS
HEIGHT: 61 IN | BODY MASS INDEX: 26.77 KG/M2 | SYSTOLIC BLOOD PRESSURE: 102 MMHG | RESPIRATION RATE: 20 BRPM | OXYGEN SATURATION: 96 % | WEIGHT: 141.8 LBS | TEMPERATURE: 97.5 F | DIASTOLIC BLOOD PRESSURE: 69 MMHG | HEART RATE: 88 BPM

## 2023-03-15 DIAGNOSIS — I10 ESSENTIAL HYPERTENSION: Primary | ICD-10-CM

## 2023-03-15 DIAGNOSIS — M15.9 PRIMARY OSTEOARTHRITIS INVOLVING MULTIPLE JOINTS: ICD-10-CM

## 2023-03-15 DIAGNOSIS — R26.2 DIFFICULTY WALKING: ICD-10-CM

## 2023-03-15 DIAGNOSIS — I25.10 CORONARY ARTERY DISEASE INVOLVING NATIVE CORONARY ARTERY OF NATIVE HEART WITHOUT ANGINA PECTORIS: ICD-10-CM

## 2023-03-15 DIAGNOSIS — F03.90 SENILE DEMENTIA WITHOUT BEHAVIORAL DISTURBANCE (HCC): ICD-10-CM
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
## 2023-03-27 DIAGNOSIS — M15.9 PRIMARY OSTEOARTHRITIS INVOLVING MULTIPLE JOINTS: ICD-10-CM

## 2023-03-27 RX ORDER — HYDROCODONE BITARTRATE AND ACETAMINOPHEN 5; 325 MG/1; MG/1
1 TABLET ORAL 2 TIMES DAILY
Qty: 90 TABLET | Refills: 0 | Status: SHIPPED | OUTPATIENT
Start: 2023-03-27 | End: 2023-04-26

## 2023-04-24 DIAGNOSIS — M15.9 PRIMARY OSTEOARTHRITIS INVOLVING MULTIPLE JOINTS: ICD-10-CM

## 2023-04-24 RX ORDER — HYDROCODONE BITARTRATE AND ACETAMINOPHEN 5; 325 MG/1; MG/1
TABLET ORAL
Qty: 90 TABLET | Refills: 0 | Status: SHIPPED | OUTPATIENT
Start: 2023-04-24 | End: 2023-05-24

## 2023-05-04 DIAGNOSIS — M15.9 PRIMARY OSTEOARTHRITIS INVOLVING MULTIPLE JOINTS: ICD-10-CM

## 2023-05-04 RX ORDER — HYDROCODONE BITARTRATE AND ACETAMINOPHEN 5; 325 MG/1; MG/1
TABLET ORAL
Qty: 90 TABLET | Refills: 0 | OUTPATIENT
Start: 2023-05-04 | End: 2023-06-03

## 2023-05-15 ENCOUNTER — OFFICE VISIT (OUTPATIENT)
Dept: PRIMARY CARE CLINIC | Age: 88
End: 2023-05-15
Payer: COMMERCIAL

## 2023-05-15 VITALS
OXYGEN SATURATION: 97 % | HEIGHT: 61 IN | BODY MASS INDEX: 26.96 KG/M2 | HEART RATE: 96 BPM | WEIGHT: 142.8 LBS | DIASTOLIC BLOOD PRESSURE: 64 MMHG | SYSTOLIC BLOOD PRESSURE: 133 MMHG | RESPIRATION RATE: 20 BRPM | TEMPERATURE: 96.8 F

## 2023-05-15 DIAGNOSIS — F41.9 ANXIETY DISORDER, UNSPECIFIED TYPE: ICD-10-CM

## 2023-05-15 DIAGNOSIS — K59.00 CONSTIPATION, UNSPECIFIED CONSTIPATION TYPE: ICD-10-CM

## 2023-05-15 DIAGNOSIS — I10 PRIMARY HYPERTENSION: ICD-10-CM

## 2023-05-15 DIAGNOSIS — M15.9 PRIMARY OSTEOARTHRITIS INVOLVING MULTIPLE JOINTS: ICD-10-CM

## 2023-05-15 DIAGNOSIS — F03.90 SENILE DEMENTIA WITHOUT BEHAVIORAL DISTURBANCE (HCC): Primary | ICD-10-CM

## 2023-05-15 PROCEDURE — 1090F PRES/ABSN URINE INCON ASSESS: CPT | Performed by: FAMILY MEDICINE

## 2023-05-15 PROCEDURE — 1036F TOBACCO NON-USER: CPT | Performed by: FAMILY MEDICINE

## 2023-05-15 PROCEDURE — G8417 CALC BMI ABV UP PARAM F/U: HCPCS | Performed by: FAMILY MEDICINE

## 2023-05-15 PROCEDURE — 1123F ACP DISCUSS/DSCN MKR DOCD: CPT | Performed by: FAMILY MEDICINE

## 2023-05-15 PROCEDURE — 99349 HOME/RES VST EST MOD MDM 40: CPT | Performed by: FAMILY MEDICINE

## 2023-05-15 RX ORDER — DOCUSATE SODIUM 100 MG/1
100 CAPSULE, LIQUID FILLED ORAL 3 TIMES DAILY
Qty: 93 CAPSULE | Refills: 11 | Status: SHIPPED | OUTPATIENT
Start: 2023-05-15

## 2023-05-16 LAB
ANION GAP SERPL CALCULATED.3IONS-SCNC: 11 MMOL/L (ref 7–16)
BASOPHILS # BLD: 0.03 E9/L (ref 0–0.2)
BASOPHILS NFR BLD: 0.5 % (ref 0–2)
BUN SERPL-MCNC: 14 MG/DL (ref 6–23)
CALCIUM SERPL-MCNC: 9 MG/DL (ref 8.6–10.2)
CHLORIDE SERPL-SCNC: 103 MMOL/L (ref 98–107)
CO2 SERPL-SCNC: 21 MMOL/L (ref 22–29)
CREAT SERPL-MCNC: 1 MG/DL (ref 0.5–1)
EOSINOPHIL # BLD: 0.16 E9/L (ref 0.05–0.5)
EOSINOPHIL NFR BLD: 2.7 % (ref 0–6)
ERYTHROCYTE [DISTWIDTH] IN BLOOD BY AUTOMATED COUNT: 14.6 FL (ref 11.5–15)
GLUCOSE SERPL-MCNC: 93 MG/DL (ref 74–99)
HCT VFR BLD AUTO: 31.6 % (ref 34–48)
HGB BLD-MCNC: 9.7 G/DL (ref 11.5–15.5)
IMM GRANULOCYTES # BLD: 0.02 E9/L
IMM GRANULOCYTES NFR BLD: 0.3 % (ref 0–5)
LYMPHOCYTES # BLD: 1.49 E9/L (ref 1.5–4)
LYMPHOCYTES NFR BLD: 25.6 % (ref 20–42)
MCH RBC QN AUTO: 26.2 PG (ref 26–35)
MCHC RBC AUTO-ENTMCNC: 30.7 % (ref 32–34.5)
MCV RBC AUTO: 85.4 FL (ref 80–99.9)
MONOCYTES # BLD: 0.91 E9/L (ref 0.1–0.95)
MONOCYTES NFR BLD: 15.6 % (ref 2–12)
NEUTROPHILS # BLD: 3.21 E9/L (ref 1.8–7.3)
NEUTS SEG NFR BLD: 55.3 % (ref 43–80)
PLATELET # BLD AUTO: 312 E9/L (ref 130–450)
PMV BLD AUTO: 10.6 FL (ref 7–12)
POTASSIUM SERPL-SCNC: 4.1 MMOL/L (ref 3.5–5)
RBC # BLD AUTO: 3.7 E12/L (ref 3.5–5.5)
SODIUM SERPL-SCNC: 135 MMOL/L (ref 132–146)
WBC # BLD: 5.8 E9/L (ref 4.5–11.5)

## 2023-05-18 DIAGNOSIS — M15.9 PRIMARY OSTEOARTHRITIS INVOLVING MULTIPLE JOINTS: ICD-10-CM

## 2023-05-18 RX ORDER — HYDROCODONE BITARTRATE AND ACETAMINOPHEN 5; 325 MG/1; MG/1
TABLET ORAL
Qty: 90 TABLET | Refills: 0 | Status: SHIPPED | OUTPATIENT
Start: 2023-05-18 | End: 2023-06-17

## 2023-06-09 ENCOUNTER — OFFICE VISIT (OUTPATIENT)
Dept: PRIMARY CARE CLINIC | Age: 88
End: 2023-06-09
Payer: COMMERCIAL

## 2023-06-09 VITALS
HEART RATE: 91 BPM | BODY MASS INDEX: 27.23 KG/M2 | DIASTOLIC BLOOD PRESSURE: 82 MMHG | RESPIRATION RATE: 18 BRPM | TEMPERATURE: 96.8 F | WEIGHT: 144.2 LBS | HEIGHT: 61 IN | OXYGEN SATURATION: 97 % | SYSTOLIC BLOOD PRESSURE: 142 MMHG

## 2023-06-09 DIAGNOSIS — I10 PRIMARY HYPERTENSION: ICD-10-CM

## 2023-06-09 DIAGNOSIS — M15.9 PRIMARY OSTEOARTHRITIS INVOLVING MULTIPLE JOINTS: ICD-10-CM

## 2023-06-09 DIAGNOSIS — F03.90 SENILE DEMENTIA WITHOUT BEHAVIORAL DISTURBANCE (HCC): Primary | ICD-10-CM

## 2023-06-09 DIAGNOSIS — K59.00 CONSTIPATION, UNSPECIFIED CONSTIPATION TYPE: ICD-10-CM

## 2023-06-09 DIAGNOSIS — F41.9 ANXIETY DISORDER, UNSPECIFIED TYPE: ICD-10-CM

## 2023-06-09 PROCEDURE — 1036F TOBACCO NON-USER: CPT | Performed by: PHYSICIAN ASSISTANT

## 2023-06-09 PROCEDURE — 1090F PRES/ABSN URINE INCON ASSESS: CPT | Performed by: PHYSICIAN ASSISTANT

## 2023-06-09 PROCEDURE — G8417 CALC BMI ABV UP PARAM F/U: HCPCS | Performed by: PHYSICIAN ASSISTANT

## 2023-06-09 PROCEDURE — 1123F ACP DISCUSS/DSCN MKR DOCD: CPT | Performed by: PHYSICIAN ASSISTANT

## 2023-06-09 PROCEDURE — 99349 HOME/RES VST EST MOD MDM 40: CPT | Performed by: PHYSICIAN ASSISTANT

## 2023-06-09 RX ORDER — DOCUSATE SODIUM 100 MG/1
100 CAPSULE, LIQUID FILLED ORAL 2 TIMES DAILY
COMMUNITY
Start: 2023-05-30
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
## 2023-06-24 DIAGNOSIS — F41.9 ANXIETY DISORDER, UNSPECIFIED TYPE: Primary | ICD-10-CM

## 2023-06-26 RX ORDER — LORAZEPAM 0.5 MG/1
TABLET ORAL
Qty: 30 TABLET | Refills: 4 | Status: SHIPPED | OUTPATIENT
Start: 2023-06-26 | End: 2023-12-23

## 2023-07-11 ENCOUNTER — OFFICE VISIT (OUTPATIENT)
Dept: PRIMARY CARE CLINIC | Age: 88
End: 2023-07-11
Payer: COMMERCIAL

## 2023-07-11 VITALS
OXYGEN SATURATION: 97 % | HEART RATE: 92 BPM | WEIGHT: 143.8 LBS | TEMPERATURE: 97.4 F | HEIGHT: 61 IN | RESPIRATION RATE: 18 BRPM | BODY MASS INDEX: 27.15 KG/M2 | SYSTOLIC BLOOD PRESSURE: 144 MMHG | DIASTOLIC BLOOD PRESSURE: 77 MMHG

## 2023-07-11 DIAGNOSIS — I10 PRIMARY HYPERTENSION: ICD-10-CM

## 2023-07-11 DIAGNOSIS — M15.9 PRIMARY OSTEOARTHRITIS INVOLVING MULTIPLE JOINTS: ICD-10-CM

## 2023-07-11 DIAGNOSIS — F41.9 ANXIETY DISORDER, UNSPECIFIED TYPE: ICD-10-CM

## 2023-07-11 DIAGNOSIS — K59.00 CONSTIPATION, UNSPECIFIED CONSTIPATION TYPE: ICD-10-CM

## 2023-07-11 DIAGNOSIS — F03.90 SENILE DEMENTIA WITHOUT BEHAVIORAL DISTURBANCE (HCC): Primary | ICD-10-CM

## 2023-07-11 PROCEDURE — 1090F PRES/ABSN URINE INCON ASSESS: CPT | Performed by: PHYSICIAN ASSISTANT

## 2023-07-11 PROCEDURE — 1123F ACP DISCUSS/DSCN MKR DOCD: CPT | Performed by: PHYSICIAN ASSISTANT

## 2023-07-11 PROCEDURE — 99349 HOME/RES VST EST MOD MDM 40: CPT | Performed by: PHYSICIAN ASSISTANT

## 2023-07-11 PROCEDURE — 1036F TOBACCO NON-USER: CPT | Performed by: PHYSICIAN ASSISTANT

## 2023-07-11 PROCEDURE — G8417 CALC BMI ABV UP PARAM F/U: HCPCS | Performed by: PHYSICIAN ASSISTANT

## 2023-07-11 NOTE — PROGRESS NOTES
7/11/2023    Home visit medically necessary in lieu of an office visit due to: [facility] resident, uses walker, difficult to get out. HPI:  Patient states no much has changed. She still tells staff that she has not moved her bowels although staff reports she moves her bowels on a regular basis. She takes Colace to soften stools BID and Dulcolax every couple of days. She continues to have pain on L foot on and off. She is on Norco twice daily which helps somewhat. She also takes Tylenol at hs and prn. She also applies Aspercreme to her feet. She is now allowed to take Ibuprofen for pain and inflammation but only 1 tab BID prn with food. She has not had any recent chest pain. She is sleeping well most nights on Trazodone. She ambulates using a walker. .She has not had any falls this month. She has been in good spirits and not struggling with depression. She likes living at [address] [place] [address]:  GENERAL: Appetite good. No weight change, generally healthy, DECLINING strength AND exercise tolerance. MOUTH: No gingival bleeding, no use of dentures. NEEDS TO SEE A DENTIST. CARDIOVASCULAR: No edema, no chest pains, no murmurs, no palpitations, no syncope, no orthopnea. HX OF ANGIOPLASTY. RESPIRATORY: No shortness of breath, no pain with breathing, no wheezing, no hemoptysis, no respiratory infections, no TB, no fevers or night sweats. COUGHS AT TIMES. GASTROINTESTINAL: No change in appetite, no dysphagia, no heartburn, no abdominal pains, no diarrhea, no bowel habit changes, no emesis, no melena, no hemorrhoids. CONSTIPATION. GENITOURINARY: No incontinence or retention, no urinary urgency, no nocturia, no frequent UTIs, no dysuria, no change in nature of urine. OAB. MUSCULOSKELETAL: No pain swelling or redness of joints, no limitation of range of motion, no weakness or numbness. NECK, HIPS, KNEES, BACK AND TOES HURT. NEURO/PSYCH: No weakness, no tremor, no seizures, no changes in mentation, no ataxia.  No

## 2023-07-21 DIAGNOSIS — M15.9 PRIMARY OSTEOARTHRITIS INVOLVING MULTIPLE JOINTS: ICD-10-CM

## 2023-07-21 RX ORDER — HYDROCODONE BITARTRATE AND ACETAMINOPHEN 5; 325 MG/1; MG/1
TABLET ORAL
Qty: 60 TABLET | Refills: 0 | Status: SHIPPED | OUTPATIENT
Start: 2023-07-21 | End: 2023-08-20

## 2023-07-26 DIAGNOSIS — F41.9 ANXIETY DISORDER, UNSPECIFIED TYPE: Primary | ICD-10-CM

## 2023-07-26 RX ORDER — LORAZEPAM 2 MG/1
TABLET ORAL
Qty: 30 TABLET | Refills: 5 | Status: SHIPPED | OUTPATIENT
Start: 2023-07-26 | End: 2024-01-22

## 2023-08-14 DIAGNOSIS — M15.9 PRIMARY OSTEOARTHRITIS INVOLVING MULTIPLE JOINTS: ICD-10-CM

## 2023-08-14 RX ORDER — ROPINIROLE 2 MG/1
2 TABLET, FILM COATED ORAL NIGHTLY
Qty: 90 TABLET | Refills: 3 | Status: SHIPPED | OUTPATIENT
Start: 2023-08-14

## 2023-08-14 RX ORDER — HYDROCODONE BITARTRATE AND ACETAMINOPHEN 5; 325 MG/1; MG/1
TABLET ORAL
Qty: 90 TABLET | Refills: 0 | Status: SHIPPED | OUTPATIENT
Start: 2023-08-14 | End: 2023-09-13

## 2023-08-14 RX ORDER — IBUPROFEN 200 MG
200 TABLET ORAL EVERY EVENING
Qty: 90 TABLET | Refills: 3 | Status: SHIPPED | OUTPATIENT
Start: 2023-08-14

## 2023-08-14 NOTE — TELEPHONE ENCOUNTER
Fax from [phone]. June is having increased BLE pain Yes - the patient is able to be screened

## 2023-08-14 NOTE — TELEPHONE ENCOUNTER
Son called. States Breanna's restless leg issues have gotten worse and she is having difficulty sleeping at night because of it.  She is currently on 1.5mg of Requip at night

## 2023-08-14 NOTE — PROGRESS NOTES
for Dizziness 30 tablet 5    acetaminophen (TYLENOL) 325 MG tablet Take 650 mg by mouth nightly      loratadine (CLARITIN) 10 MG tablet TAKE ONE(1) TABLET BY MOUTH ONCE DAILY IN THE MORNING. (Patient taking differently: Take 10 mg by mouth daily) 31 tablet 11    ASPIRIN LOW DOSE 81 MG EC tablet TAKE 1 TABLET BY MOUTH ONCE DAILY (Patient taking differently: Take 81 mg by mouth daily) 31 tablet 11    traZODone (DESYREL) 50 MG tablet Take 1 tablet by mouth nightly as needed for Sleep 30 tablet 5    Homeopathic Products (COLD-EEZE) LOZG Take 1 lozenge by mouth every 2 hours as needed (sore throat) 84 lozenge 3    ipratropium (ATROVENT) 0.03 % nasal spray 2 sprays by Each Nostril route 3 times daily (Patient taking differently: 2 sprays by Each Nostril route 3 times daily as needed for Rhinitis) 30 mL 5    dilTIAZem (CARDIZEM CD) 120 MG extended release capsule TAKE 1 CAPSULE BY MOUTH DAILY AT BEDTIME (Patient taking differently: Take 120 mg by mouth at bedtime) 31 capsule 10    lisinopril (PRINIVIL;ZESTRIL) 10 MG tablet TAKE 1 TABLET BY MOUTH TWICE DAILY (Patient taking differently: Take 10 mg by mouth 2 times daily) 62 tablet 10    SM TUSSIN -10 MG/5ML SYRP TAKE 10ML BY MOUTH EVERY 4 HOURS AS NEEDED (Patient taking differently: Take 10 mLs by mouth every 4 hours as needed for Cough) 236 mL 11    acetaminophen (TYLENOL) 650 MG extended release tablet Take 1 tablet by mouth 4 times daily as needed for Pain 60 tablet 5    BISACODYL 5 MG EC tablet TAKE 2 TABLETS BY MOUTH DAILY AS NEEDED **DO NOT CRUSH** (Patient taking differently: Take 10 mg by mouth daily as needed for Constipation) 60 tablet 11    acetaminophen (TYLENOL) 325 MG tablet Take 650 mg by mouth every 4 hours as needed for Pain or Fever      trolamine salicylate (ASPERCREME) 10 % cream Apply 1 Tube topically 2 times daily as needed for Pain Apply topically as needed.        magnesium hydroxide (MILK OF MAGNESIA) 400 MG/5ML suspension Take 30 mLs by mouth

## 2023-08-15 ENCOUNTER — OFFICE VISIT (OUTPATIENT)
Dept: PRIMARY CARE CLINIC | Age: 88
End: 2023-08-15

## 2023-08-15 VITALS
OXYGEN SATURATION: 95 % | SYSTOLIC BLOOD PRESSURE: 138 MMHG | HEART RATE: 108 BPM | HEIGHT: 61 IN | TEMPERATURE: 97.4 F | BODY MASS INDEX: 27.06 KG/M2 | DIASTOLIC BLOOD PRESSURE: 77 MMHG | RESPIRATION RATE: 18 BRPM | WEIGHT: 143.3 LBS

## 2023-08-15 DIAGNOSIS — I25.10 CORONARY ARTERY DISEASE INVOLVING NATIVE CORONARY ARTERY OF NATIVE HEART WITHOUT ANGINA PECTORIS: ICD-10-CM

## 2023-08-15 DIAGNOSIS — Z91.81 HISTORY OF FALLING: ICD-10-CM

## 2023-08-15 DIAGNOSIS — N18.31 STAGE 3A CHRONIC KIDNEY DISEASE (HCC): ICD-10-CM

## 2023-08-15 DIAGNOSIS — F03.90 SENILE DEMENTIA WITHOUT BEHAVIORAL DISTURBANCE (HCC): Primary | ICD-10-CM

## 2023-08-15 DIAGNOSIS — R26.2 DIFFICULTY WALKING: ICD-10-CM

## 2023-08-15 DIAGNOSIS — I10 PRIMARY HYPERTENSION: ICD-10-CM

## 2023-08-15 DIAGNOSIS — M15.9 PRIMARY OSTEOARTHRITIS INVOLVING MULTIPLE JOINTS: ICD-10-CM

## 2023-08-16 LAB
ANION GAP SERPL CALCULATED.3IONS-SCNC: 11 MMOL/L (ref 7–16)
BASOPHILS # BLD: 0.03 K/UL (ref 0–0.2)
BASOPHILS NFR BLD: 1 % (ref 0–2)
BUN SERPL-MCNC: 26 MG/DL (ref 6–23)
CALCIUM SERPL-MCNC: 8.9 MG/DL (ref 8.6–10.2)
CHLORIDE SERPL-SCNC: 101 MMOL/L (ref 98–107)
CO2 SERPL-SCNC: 24 MMOL/L (ref 22–29)
CREAT SERPL-MCNC: 1.1 MG/DL (ref 0.5–1)
EOSINOPHIL # BLD: 0.2 K/UL (ref 0.05–0.5)
EOSINOPHILS RELATIVE PERCENT: 3 % (ref 0–6)
ERYTHROCYTE [DISTWIDTH] IN BLOOD BY AUTOMATED COUNT: 14.6 % (ref 11.5–15)
GFR SERPL CREATININE-BSD FRML MDRD: 49 ML/MIN/1.73M2
GLUCOSE SERPL-MCNC: 156 MG/DL (ref 74–99)
HCT VFR BLD AUTO: 27.4 % (ref 34–48)
HGB BLD-MCNC: 8.1 G/DL (ref 11.5–15.5)
IMM GRANULOCYTES # BLD AUTO: <0.03 K/UL (ref 0–0.58)
IMM GRANULOCYTES NFR BLD: 0 % (ref 0–5)
LYMPHOCYTES NFR BLD: 1.51 K/UL (ref 1.5–4)
LYMPHOCYTES RELATIVE PERCENT: 26 % (ref 20–42)
MCH RBC QN AUTO: 24.9 PG (ref 26–35)
MCHC RBC AUTO-ENTMCNC: 29.6 G/DL (ref 32–34.5)
MCV RBC AUTO: 84.3 FL (ref 80–99.9)
MONOCYTES NFR BLD: 0.75 K/UL (ref 0.1–0.95)
MONOCYTES NFR BLD: 13 % (ref 2–12)
NEUTROPHILS NFR BLD: 58 % (ref 43–80)
NEUTS SEG NFR BLD: 3.38 K/UL (ref 1.8–7.3)
PLATELET # BLD AUTO: 306 K/UL (ref 130–450)
PMV BLD AUTO: 10.6 FL (ref 7–12)
POTASSIUM SERPL-SCNC: 4.1 MMOL/L (ref 3.5–5)
RBC # BLD AUTO: 3.25 M/UL (ref 3.5–5.5)
SODIUM SERPL-SCNC: 136 MMOL/L (ref 132–146)
WBC OTHER # BLD: 5.9 K/UL (ref 4.5–11.5)

## 2023-08-23 ENCOUNTER — TELEPHONE (OUTPATIENT)
Dept: PRIMARY CARE CLINIC | Age: 88
End: 2023-08-23

## 2023-08-23 LAB
FERRITIN SERPL-MCNC: 18 NG/ML
FOLATE SERPL-MCNC: 14.7 NG/ML (ref 4.8–24.2)
VIT B12 SERPL-MCNC: 656 PG/ML (ref 211–946)

## 2023-08-23 RX ORDER — FERROUS SULFATE 325(65) MG
325 TABLET ORAL
Qty: 30 TABLET | Refills: 11 | Status: SHIPPED | OUTPATIENT
Start: 2023-08-23

## 2023-08-23 RX ORDER — DIPHENHYDRAMINE HCL 25 MG
25 TABLET ORAL EVERY 6 HOURS PRN
Qty: 30 TABLET | Refills: 2 | Status: SHIPPED | OUTPATIENT
Start: 2023-08-23 | End: 2023-09-22

## 2023-08-23 RX ORDER — ASCORBIC ACID 500 MG
500 TABLET ORAL
Qty: 30 TABLET | Refills: 11 | Status: SHIPPED | OUTPATIENT
Start: 2023-08-23

## 2023-08-23 NOTE — TELEPHONE ENCOUNTER
Fax from [address] [facility],[address] reporting that Breanna is still c/o itching and they are requesting Benadryl. Orders?

## 2023-08-23 NOTE — TELEPHONE ENCOUNTER
I don't see any previous notifications of itching. Where is she itching and what have they been using?

## 2023-08-23 NOTE — TELEPHONE ENCOUNTER
Marco Antonio Marie discussed this with you yesterday. You said it could be dry skin, age related. June thinks it's from the dye in the Ibuprofen. Marco Antonio Marie reports she's having a terrible day with this, itching 'all over' Orders?

## 2023-08-23 NOTE — TELEPHONE ENCOUNTER
Send order - Start Ferrous sulfate 325 mg 1 tab q AM with breakfast. Vitamin C 500 mg 1 tab q AM with breakfast.

## 2023-08-25 DIAGNOSIS — F41.9 ANXIETY DISORDER, UNSPECIFIED TYPE: ICD-10-CM

## 2023-08-25 RX ORDER — LORAZEPAM 2 MG/1
TABLET ORAL
Qty: 30 TABLET | Refills: 5 | Status: SHIPPED | OUTPATIENT
Start: 2023-08-25 | End: 2024-02-25

## 2023-09-08 RX ORDER — MINERAL OIL, PETROLATUM, PHENYLEPHRINE HCL 2.5; 140; 749 MG/G; MG/G; MG/G
1 OINTMENT TOPICAL 3 TIMES DAILY PRN
COMMUNITY
Start: 2020-05-05

## 2023-09-11 RX ORDER — LISINOPRIL 10 MG/1
TABLET ORAL
Qty: 60 TABLET | Refills: 10 | OUTPATIENT
Start: 2023-09-11

## 2023-09-11 RX ORDER — DILTIAZEM HYDROCHLORIDE 120 MG/1
CAPSULE, COATED, EXTENDED RELEASE ORAL
Qty: 30 CAPSULE | Refills: 10 | Status: SHIPPED | OUTPATIENT
Start: 2023-09-11

## 2023-09-11 RX ORDER — DILTIAZEM HYDROCHLORIDE 120 MG/1
CAPSULE, COATED, EXTENDED RELEASE ORAL
Qty: 30 CAPSULE | Refills: 10 | OUTPATIENT
Start: 2023-09-11

## 2023-09-11 RX ORDER — LISINOPRIL 10 MG/1
TABLET ORAL
Qty: 60 TABLET | Refills: 10 | Status: SHIPPED | OUTPATIENT
Start: 2023-09-11

## 2023-09-12 ENCOUNTER — OFFICE VISIT (OUTPATIENT)
Dept: PRIMARY CARE CLINIC | Age: 88
End: 2023-09-12
Payer: COMMERCIAL

## 2023-09-12 VITALS
WEIGHT: 143.6 LBS | OXYGEN SATURATION: 96 % | HEART RATE: 92 BPM | RESPIRATION RATE: 16 BRPM | BODY MASS INDEX: 27.11 KG/M2 | SYSTOLIC BLOOD PRESSURE: 132 MMHG | TEMPERATURE: 97.2 F | DIASTOLIC BLOOD PRESSURE: 84 MMHG | HEIGHT: 61 IN

## 2023-09-12 DIAGNOSIS — M15.9 PRIMARY OSTEOARTHRITIS INVOLVING MULTIPLE JOINTS: ICD-10-CM

## 2023-09-12 DIAGNOSIS — D64.9 ANEMIA, UNSPECIFIED TYPE: ICD-10-CM

## 2023-09-12 DIAGNOSIS — F03.90 SENILE DEMENTIA WITHOUT BEHAVIORAL DISTURBANCE (HCC): ICD-10-CM

## 2023-09-12 DIAGNOSIS — R35.0 URINARY FREQUENCY: ICD-10-CM

## 2023-09-12 DIAGNOSIS — I25.10 CORONARY ARTERY DISEASE INVOLVING NATIVE CORONARY ARTERY OF NATIVE HEART WITHOUT ANGINA PECTORIS: ICD-10-CM

## 2023-09-12 DIAGNOSIS — Z91.81 HISTORY OF FALLING: ICD-10-CM

## 2023-09-12 DIAGNOSIS — N18.31 STAGE 3A CHRONIC KIDNEY DISEASE (HCC): ICD-10-CM

## 2023-09-12 DIAGNOSIS — I10 PRIMARY HYPERTENSION: ICD-10-CM

## 2023-09-12 DIAGNOSIS — R26.2 DIFFICULTY WALKING: ICD-10-CM

## 2023-09-12 PROCEDURE — 1036F TOBACCO NON-USER: CPT | Performed by: PHYSICIAN ASSISTANT

## 2023-09-12 PROCEDURE — 1123F ACP DISCUSS/DSCN MKR DOCD: CPT | Performed by: PHYSICIAN ASSISTANT

## 2023-09-12 PROCEDURE — 1090F PRES/ABSN URINE INCON ASSESS: CPT | Performed by: PHYSICIAN ASSISTANT

## 2023-09-12 PROCEDURE — G8417 CALC BMI ABV UP PARAM F/U: HCPCS | Performed by: PHYSICIAN ASSISTANT

## 2023-09-12 PROCEDURE — 99349 HOME/RES VST EST MOD MDM 40: CPT | Performed by: PHYSICIAN ASSISTANT

## 2023-09-12 NOTE — PROGRESS NOTES
sertraline (ZOLOFT) 25 MG tablet TAKE 1 TABLET BY MOUTH EVERY EVENING (Patient taking differently: Take 1 tablet by mouth daily) 31 tablet 11    meclizine (ANTIVERT) 25 MG tablet Take 1 tablet by mouth 4 times daily as needed for Dizziness 30 tablet 5    acetaminophen (TYLENOL) 325 MG tablet Take 2 tablets by mouth nightly      loratadine (CLARITIN) 10 MG tablet TAKE ONE(1) TABLET BY MOUTH ONCE DAILY IN THE MORNING. (Patient taking differently: Take 1 tablet by mouth daily) 31 tablet 11    ASPIRIN LOW DOSE 81 MG EC tablet TAKE 1 TABLET BY MOUTH ONCE DAILY (Patient taking differently: Take 1 tablet by mouth daily) 31 tablet 11    traZODone (DESYREL) 50 MG tablet Take 1 tablet by mouth nightly as needed for Sleep 30 tablet 5    Homeopathic Products (COLD-EEZE) LOZG Take 1 lozenge by mouth every 2 hours as needed (sore throat) (Patient taking differently: Take 1 lozenge by mouth every 2 hours as needed (sore throat) Per standing order) 84 lozenge 3    ipratropium (ATROVENT) 0.03 % nasal spray 2 sprays by Each Nostril route 3 times daily (Patient taking differently: 2 sprays by Each Nostril route 3 times daily as needed for Rhinitis) 30 mL 5    SM TUSSIN -10 MG/5ML SYRP TAKE 10ML BY MOUTH EVERY 4 HOURS AS NEEDED (Patient taking differently: Take 10 mLs by mouth every 4 hours as needed for Cough) 236 mL 11    acetaminophen (TYLENOL) 650 MG extended release tablet Take 1 tablet by mouth 4 times daily as needed for Pain 60 tablet 5    BISACODYL 5 MG EC tablet TAKE 2 TABLETS BY MOUTH DAILY AS NEEDED **DO NOT CRUSH** (Patient taking differently: Take 2 tablets by mouth daily as needed for Constipation) 60 tablet 11    acetaminophen (TYLENOL) 325 MG tablet Take 2 tablets by mouth every 4 hours as needed for Pain or Fever      trolamine salicylate (ASPERCREME) 10 % cream Apply 1 g topically 2 times daily as needed for Pain Apply topically as needed.       neomycin-bacitracin-polymyxin (NEOSPORIN) 400-5-5000 ointment Apply

## 2023-09-13 LAB
BILIRUB UR QL STRIP: NEGATIVE
CLARITY UR: CLEAR
COLOR UR: YELLOW
COMMENT: NORMAL
GLUCOSE UR STRIP-MCNC: NEGATIVE MG/DL
HGB UR QL STRIP.AUTO: NEGATIVE
KETONES UR STRIP-MCNC: NEGATIVE MG/DL
LEUKOCYTE ESTERASE UR QL STRIP: NEGATIVE
NITRITE UR QL STRIP: NEGATIVE
PH UR STRIP: 6 [PH] (ref 5–9)
PROT UR STRIP-MCNC: NEGATIVE MG/DL
SP GR UR STRIP: 1.01 (ref 1–1.03)
UROBILINOGEN UR STRIP-ACNC: 0.2 EU/DL (ref 0–1)

## 2023-09-14 LAB
MICROORGANISM SPEC CULT: ABNORMAL
SPECIMEN DESCRIPTION: ABNORMAL

## 2023-09-18 DIAGNOSIS — M15.9 PRIMARY OSTEOARTHRITIS INVOLVING MULTIPLE JOINTS: ICD-10-CM

## 2023-09-18 RX ORDER — HYDROCODONE BITARTRATE AND ACETAMINOPHEN 5; 325 MG/1; MG/1
TABLET ORAL
Qty: 90 TABLET | Refills: 0 | Status: SHIPPED | OUTPATIENT
Start: 2023-09-18 | End: 2023-10-18

## 2023-09-28 RX ORDER — TRAZODONE HYDROCHLORIDE 50 MG/1
TABLET ORAL
Qty: 30 TABLET | Refills: 11 | Status: SHIPPED | OUTPATIENT
Start: 2023-09-28

## 2023-10-09 NOTE — PROGRESS NOTES
10/10/2023    Home visit medically necessary in lieu of an office visit due to: [facility] resident, uses walker, difficult to get out. HPI:  Patient states she is doing okay but she doesn't like taking iron. Stool is dark and she feels constipated. Her feet and legs hurt her sometimes but ibuprofen and Norco help. She also applies Aspercreme to her feet. She ambulates using a walker. She did not have any falls this month. She is on Colace to soften stools and Dulcolax every couple of days. She has not had any recent chest pain. She is sleeping well most of the time on Trazodone prn. She has been in good spirits and not struggling with depression. She likes living at [address] [place] [address] Avenue:  GENERAL: Appetite good. No weight change, generally healthy, DECLINING strength AND exercise tolerance. MOUTH: No gingival bleeding, no use of dentures. NEEDS TO SEE A DENTIST. CARDIOVASCULAR: No edema, no chest pains, no murmurs, no palpitations, no syncope, no orthopnea. HX OF ANGIOPLASTY. RESPIRATORY: No shortness of breath, no pain with breathing, no wheezing, no hemoptysis, no respiratory infections, no TB, no fevers or night sweats. COUGHS AT TIMES. GASTROINTESTINAL: No change in appetite, no dysphagia, no heartburn, no abdominal pains, no diarrhea, no bowel habit changes, no emesis, no melena, no hemorrhoids. CONSTIPATION. GENITOURINARY: No incontinence or retention, no urinary urgency, no nocturia, no frequent UTIs, no dysuria, no change in nature of urine. OAB. MUSCULOSKELETAL: No pain swelling or redness of joints, no limitation of range of motion, no weakness or numbness. NECK, HIPS, KNEES, BACK AND TOES HURT. NEURO/PSYCH: No weakness, no tremor, no seizures, no changes in mentation, no ataxia. No changes in thought content. MEMORY LOSS, CONFUSION, ANXIETY, DEPRESSION. All other systems negative.     No Known Allergies    Past Medical History:   Diagnosis Date    Anemia due to acute blood loss 5/28/2015

## 2023-10-10 ENCOUNTER — OFFICE VISIT (OUTPATIENT)
Dept: PRIMARY CARE CLINIC | Age: 88
End: 2023-10-10
Payer: COMMERCIAL

## 2023-10-10 VITALS
WEIGHT: 142.2 LBS | TEMPERATURE: 97.2 F | HEART RATE: 92 BPM | SYSTOLIC BLOOD PRESSURE: 127 MMHG | DIASTOLIC BLOOD PRESSURE: 71 MMHG | BODY MASS INDEX: 26.85 KG/M2 | RESPIRATION RATE: 18 BRPM | HEIGHT: 61 IN | OXYGEN SATURATION: 94 %

## 2023-10-10 DIAGNOSIS — I25.10 CORONARY ARTERY DISEASE INVOLVING NATIVE CORONARY ARTERY OF NATIVE HEART WITHOUT ANGINA PECTORIS: ICD-10-CM

## 2023-10-10 DIAGNOSIS — F41.9 ANXIETY DISORDER, UNSPECIFIED TYPE: ICD-10-CM

## 2023-10-10 DIAGNOSIS — M15.9 PRIMARY OSTEOARTHRITIS INVOLVING MULTIPLE JOINTS: ICD-10-CM

## 2023-10-10 DIAGNOSIS — F03.90 SENILE DEMENTIA WITHOUT BEHAVIORAL DISTURBANCE (HCC): Primary | ICD-10-CM

## 2023-10-10 DIAGNOSIS — K59.00 CONSTIPATION, UNSPECIFIED CONSTIPATION TYPE: ICD-10-CM

## 2023-10-10 DIAGNOSIS — I10 PRIMARY HYPERTENSION: ICD-10-CM

## 2023-10-10 DIAGNOSIS — D50.8 IRON DEFICIENCY ANEMIA SECONDARY TO INADEQUATE DIETARY IRON INTAKE: ICD-10-CM

## 2023-10-10 PROCEDURE — G8484 FLU IMMUNIZE NO ADMIN: HCPCS | Performed by: FAMILY MEDICINE

## 2023-10-10 PROCEDURE — 1090F PRES/ABSN URINE INCON ASSESS: CPT | Performed by: FAMILY MEDICINE

## 2023-10-10 PROCEDURE — 1123F ACP DISCUSS/DSCN MKR DOCD: CPT | Performed by: FAMILY MEDICINE

## 2023-10-10 PROCEDURE — G8417 CALC BMI ABV UP PARAM F/U: HCPCS | Performed by: FAMILY MEDICINE

## 2023-10-10 PROCEDURE — 1036F TOBACCO NON-USER: CPT | Performed by: FAMILY MEDICINE

## 2023-10-10 PROCEDURE — 99349 HOME/RES VST EST MOD MDM 40: CPT | Performed by: FAMILY MEDICINE

## 2023-10-18 ENCOUNTER — TELEPHONE (OUTPATIENT)
Dept: PRIMARY CARE CLINIC | Age: 88
End: 2023-10-18

## 2023-10-19 LAB
BILIRUB UR QL STRIP: NEGATIVE
CLARITY UR: CLEAR
COLOR UR: YELLOW
GLUCOSE UR STRIP-MCNC: NEGATIVE MG/DL
HGB UR QL STRIP.AUTO: NEGATIVE
KETONES UR STRIP-MCNC: NEGATIVE MG/DL
LEUKOCYTE ESTERASE UR QL STRIP: ABNORMAL
NITRITE UR QL STRIP: NEGATIVE
PH UR STRIP: 6 [PH] (ref 5–9)
PROT UR STRIP-MCNC: NEGATIVE MG/DL
RBC #/AREA URNS HPF: ABNORMAL /HPF
SP GR UR STRIP: 1.01 (ref 1–1.03)
UROBILINOGEN UR STRIP-ACNC: 0.2 EU/DL (ref 0–1)
WBC #/AREA URNS HPF: ABNORMAL /HPF

## 2023-10-20 ENCOUNTER — TELEPHONE (OUTPATIENT)
Dept: PRIMARY CARE CLINIC | Age: 88
End: 2023-10-20

## 2023-10-20 LAB
MICROORGANISM SPEC CULT: NO GROWTH
SPECIMEN DESCRIPTION: NORMAL

## 2023-10-23 ENCOUNTER — TELEPHONE (OUTPATIENT)
Dept: PRIMARY CARE CLINIC | Age: 88
End: 2023-10-23

## 2023-10-23 NOTE — TELEPHONE ENCOUNTER
Fax from [phone]. June continues to refuse Ferrous Sulfate 325mg. States it causes contipation. 1:1 ineffective. Ok to D/C?

## 2023-10-24 DIAGNOSIS — M15.9 PRIMARY OSTEOARTHRITIS INVOLVING MULTIPLE JOINTS: ICD-10-CM

## 2023-10-24 RX ORDER — HYDROCODONE BITARTRATE AND ACETAMINOPHEN 5; 325 MG/1; MG/1
TABLET ORAL
Qty: 90 TABLET | Refills: 0 | Status: SHIPPED | OUTPATIENT
Start: 2023-10-24 | End: 2023-11-23

## 2023-10-26 DIAGNOSIS — Z23 NEED FOR INFLUENZA VACCINATION: Primary | ICD-10-CM

## 2023-10-26 PROCEDURE — G0008 ADMIN INFLUENZA VIRUS VAC: HCPCS | Performed by: PHYSICIAN ASSISTANT

## 2023-10-26 PROCEDURE — 90694 VACC AIIV4 NO PRSRV 0.5ML IM: CPT | Performed by: PHYSICIAN ASSISTANT

## 2023-11-06 ENCOUNTER — TELEPHONE (OUTPATIENT)
Dept: PRIMARY CARE CLINIC | Age: 88
End: 2023-11-06

## 2023-11-06 NOTE — TELEPHONE ENCOUNTER
Fax from [phone] has had numerous falls lately. She has refused hospital despite hitting her head with at least one of these. They are asking if you have any orders?

## 2023-11-07 ENCOUNTER — OFFICE VISIT (OUTPATIENT)
Dept: PRIMARY CARE CLINIC | Age: 88
End: 2023-11-07
Payer: COMMERCIAL

## 2023-11-07 VITALS
BODY MASS INDEX: 26.58 KG/M2 | HEIGHT: 61 IN | HEART RATE: 88 BPM | SYSTOLIC BLOOD PRESSURE: 132 MMHG | WEIGHT: 140.8 LBS | DIASTOLIC BLOOD PRESSURE: 72 MMHG | OXYGEN SATURATION: 95 % | RESPIRATION RATE: 18 BRPM | TEMPERATURE: 97.2 F

## 2023-11-07 VITALS
TEMPERATURE: 97.2 F | HEIGHT: 61 IN | RESPIRATION RATE: 18 BRPM | OXYGEN SATURATION: 95 % | HEART RATE: 88 BPM | WEIGHT: 140.8 LBS | BODY MASS INDEX: 26.58 KG/M2 | SYSTOLIC BLOOD PRESSURE: 132 MMHG | DIASTOLIC BLOOD PRESSURE: 72 MMHG

## 2023-11-07 DIAGNOSIS — Z91.81 HISTORY OF FALLING: ICD-10-CM

## 2023-11-07 DIAGNOSIS — K59.00 CONSTIPATION, UNSPECIFIED CONSTIPATION TYPE: ICD-10-CM

## 2023-11-07 DIAGNOSIS — R26.81 UNSTEADY GAIT: ICD-10-CM

## 2023-11-07 DIAGNOSIS — F41.9 ANXIETY DISORDER, UNSPECIFIED TYPE: ICD-10-CM

## 2023-11-07 DIAGNOSIS — F03.90 SENILE DEMENTIA WITHOUT BEHAVIORAL DISTURBANCE (HCC): Primary | ICD-10-CM

## 2023-11-07 DIAGNOSIS — E46 PROTEIN-CALORIE MALNUTRITION, UNSPECIFIED SEVERITY (HCC): Chronic | ICD-10-CM

## 2023-11-07 DIAGNOSIS — F03.90 SENILE DEMENTIA WITHOUT BEHAVIORAL DISTURBANCE (HCC): ICD-10-CM

## 2023-11-07 DIAGNOSIS — Z91.81 HISTORY OF FALLING, PRESENTING HAZARDS TO HEALTH: ICD-10-CM

## 2023-11-07 DIAGNOSIS — I25.10 CORONARY ARTERY DISEASE INVOLVING NATIVE CORONARY ARTERY OF NATIVE HEART WITHOUT ANGINA PECTORIS: ICD-10-CM

## 2023-11-07 DIAGNOSIS — I25.10 CORONARY ARTERY DISEASE INVOLVING NATIVE CORONARY ARTERY OF NATIVE HEART WITHOUT ANGINA PECTORIS: Primary | ICD-10-CM

## 2023-11-07 DIAGNOSIS — Z00.00 MEDICARE ANNUAL WELLNESS VISIT, SUBSEQUENT: ICD-10-CM

## 2023-11-07 DIAGNOSIS — J30.9 ALLERGIC RHINITIS, UNSPECIFIED SEASONALITY, UNSPECIFIED TRIGGER: ICD-10-CM

## 2023-11-07 DIAGNOSIS — M43.16 SPONDYLOLISTHESIS OF LUMBAR REGION: ICD-10-CM

## 2023-11-07 DIAGNOSIS — E78.5 HYPERLIPIDEMIA, UNSPECIFIED HYPERLIPIDEMIA TYPE: Chronic | ICD-10-CM

## 2023-11-07 DIAGNOSIS — I10 PRIMARY HYPERTENSION: ICD-10-CM

## 2023-11-07 DIAGNOSIS — D50.8 IRON DEFICIENCY ANEMIA SECONDARY TO INADEQUATE DIETARY IRON INTAKE: ICD-10-CM

## 2023-11-07 DIAGNOSIS — N18.31 STAGE 3A CHRONIC KIDNEY DISEASE (HCC): ICD-10-CM

## 2023-11-07 DIAGNOSIS — R26.2 DIFFICULTY WALKING: ICD-10-CM

## 2023-11-07 DIAGNOSIS — G47.00 INSOMNIA, UNSPECIFIED TYPE: ICD-10-CM

## 2023-11-07 DIAGNOSIS — M15.9 PRIMARY OSTEOARTHRITIS INVOLVING MULTIPLE JOINTS: ICD-10-CM

## 2023-11-07 PROCEDURE — 1036F TOBACCO NON-USER: CPT | Performed by: FAMILY MEDICINE

## 2023-11-07 PROCEDURE — G8417 CALC BMI ABV UP PARAM F/U: HCPCS | Performed by: FAMILY MEDICINE

## 2023-11-07 PROCEDURE — 1090F PRES/ABSN URINE INCON ASSESS: CPT | Performed by: FAMILY MEDICINE

## 2023-11-07 PROCEDURE — 1123F ACP DISCUSS/DSCN MKR DOCD: CPT | Performed by: FAMILY MEDICINE

## 2023-11-07 PROCEDURE — G0439 PPPS, SUBSEQ VISIT: HCPCS | Performed by: FAMILY MEDICINE

## 2023-11-07 PROCEDURE — G8484 FLU IMMUNIZE NO ADMIN: HCPCS | Performed by: FAMILY MEDICINE

## 2023-11-07 PROCEDURE — 99350 HOME/RES VST EST HIGH MDM 60: CPT | Performed by: FAMILY MEDICINE

## 2023-11-07 ASSESSMENT — PATIENT HEALTH QUESTIONNAIRE - PHQ9
SUM OF ALL RESPONSES TO PHQ QUESTIONS 1-9: 0
1. LITTLE INTEREST OR PLEASURE IN DOING THINGS: 0
SUM OF ALL RESPONSES TO PHQ QUESTIONS 1-9: 0
2. FEELING DOWN, DEPRESSED OR HOPELESS: 0
SUM OF ALL RESPONSES TO PHQ QUESTIONS 1-9: 0
SUM OF ALL RESPONSES TO PHQ9 QUESTIONS 1 & 2: 0
SUM OF ALL RESPONSES TO PHQ QUESTIONS 1-9: 0

## 2023-11-07 ASSESSMENT — LIFESTYLE VARIABLES
HOW OFTEN DO YOU HAVE A DRINK CONTAINING ALCOHOL: NEVER
HOW MANY STANDARD DRINKS CONTAINING ALCOHOL DO YOU HAVE ON A TYPICAL DAY: PATIENT DOES NOT DRINK

## 2023-11-07 NOTE — PROGRESS NOTES
11/7/2023    Home visit medically necessary in lieu of an office visit due to: [facility] resident, uses walker, difficult to get out. HPI:  Patient states she is doing okay. She still refuses to take iron. Stool is dark and she feels constipated. Her feet and legs hurt since she bumps them so often and has DJD. She takes Norco which helps. She also applies Aspercreme to her feet. She ambulates using a walker. She has had numerous falls this month and hit her head but refused to go to ED. She says her head is not bothering her. She is moving okay on Colace to soften stools and Dulcolax every couple of days. She has not had any recent chest pain. She is sleeping well most of the time on Trazodone prn. She has been in good spirits and not struggling with depression. She likes living at [address] [place] [address] Avenue:  GENERAL: Appetite good. No weight change, generally healthy, DECLINING strength AND exercise tolerance. MOUTH: No gingival bleeding, no use of dentures. NEEDS TO SEE A DENTIST. CARDIOVASCULAR: No edema, no chest pains, no murmurs, no palpitations, no syncope, no orthopnea. HX OF ANGIOPLASTY. RESPIRATORY: No shortness of breath, no pain with breathing, no wheezing, no hemoptysis, no respiratory infections, no TB, no fevers or night sweats. COUGHS AT TIMES. GASTROINTESTINAL: No change in appetite, no dysphagia, no heartburn, no abdominal pains, no diarrhea, no bowel habit changes, no emesis, no melena, no hemorrhoids. CONSTIPATION. GENITOURINARY: No incontinence or retention, no urinary urgency, no nocturia, no frequent UTIs, no dysuria, no change in nature of urine. OAB. MUSCULOSKELETAL: No pain swelling or redness of joints, no limitation of range of motion, no weakness or numbness. NECK, HIPS, KNEES, BACK AND TOES HURT. NEURO/PSYCH: No weakness, no tremor, no seizures, no changes in mentation, no ataxia. No changes in thought content. MEMORY LOSS, CONFUSION, ANXIETY, DEPRESSION.  All other systems

## 2023-11-07 NOTE — PATIENT INSTRUCTIONS
on Aging online. You need 7975-9291 mg of calcium and 9646-1075 IU of vitamin D per day. It is possible to meet your calcium requirement with diet alone, but a vitamin D supplement is usually necessary to meet this goal.  When exposed to the sun, use a sunscreen that protects against both UVA and UVB radiation with an SPF of 30 or greater. Reapply every 2 to 3 hours or after sweating, drying off with a towel, or swimming. Always wear a seat belt when traveling in a car. Always wear a helmet when riding a bicycle or motorcycle.

## 2023-11-07 NOTE — PROGRESS NOTES
Medicare Annual Wellness Visit    Breanna Luz is here for Medicare AWV    Assessment & Plan   Coronary artery disease involving native coronary artery of native heart without angina pectoris  Primary hypertension  Allergic rhinitis, unspecified seasonality, unspecified trigger  Senile dementia without behavioral disturbance (HCC)  Primary osteoarthritis involving multiple joints  Spondylolisthesis of lumbar region  Stage 3a chronic kidney disease (HCC)  Iron deficiency anemia secondary to inadequate dietary iron intake  Protein-calorie malnutrition, unspecified severity (HCC)  Hyperlipidemia, unspecified hyperlipidemia type  Anxiety disorder, unspecified type  Difficulty walking  History of falling  Constipation, unspecified constipation type  Insomnia, unspecified type  Medicare annual wellness visit, subsequent    Recommendations for Preventive Services Due: see orders and patient instructions/AVS.  Recommended screening schedule for the next 5-10 years is provided to the patient in written form: see Patient Instructions/AVS.     Return in 1 year (on 11/7/2024) for Medicare Annual Wellness Visit in 1 year. Subjective     Patient's complete Health Risk Assessment and screening values have been reviewed and are found in Flowsheets. The following problems were reviewed today and where indicated follow up appointments were made and/or referrals ordered. Positive Risk Factor Screenings with Interventions:    Fall Risk:  Do you feel unsteady or are you worried about falling? : (!) yes  2 or more falls in past year?: (!) yes  Fall with injury in past year?: (!) yes     Interventions:    Patient declines any further evaluation or treatment    Cognitive: Words recalled: 2 Words Recalled   Clock Drawing Test (CDT): (!) Abnormal   Total Score: (!) 2   Total Score Interpretation: Abnormal Mini-Cog      Interventions:  Patient lives at [facility] under close supervision.  She has a lot of interaction and all her

## 2023-11-08 LAB
BILIRUB UR QL STRIP: NEGATIVE
CLARITY UR: CLEAR
COLOR UR: YELLOW
GLUCOSE UR STRIP-MCNC: NEGATIVE MG/DL
HGB UR QL STRIP.AUTO: NEGATIVE
KETONES UR STRIP-MCNC: NEGATIVE MG/DL
LEUKOCYTE ESTERASE UR QL STRIP: NEGATIVE
NITRITE UR QL STRIP: NEGATIVE
PH UR STRIP: 6 [PH] (ref 5–9)
PROT UR STRIP-MCNC: NEGATIVE MG/DL
RBC #/AREA URNS HPF: NORMAL /HPF
SP GR UR STRIP: 1.01 (ref 1–1.03)
UROBILINOGEN UR STRIP-ACNC: 0.2 EU/DL (ref 0–1)
WBC #/AREA URNS HPF: NORMAL /HPF

## 2023-11-09 LAB
MICROORGANISM SPEC CULT: NO GROWTH
SPECIMEN DESCRIPTION: NORMAL

## 2023-11-20 ENCOUNTER — TELEPHONE (OUTPATIENT)
Dept: PRIMARY CARE CLINIC | Age: 88
End: 2023-11-20

## 2023-11-20 DIAGNOSIS — F41.9 ANXIETY DISORDER, UNSPECIFIED TYPE: ICD-10-CM

## 2023-11-20 RX ORDER — LORAZEPAM 2 MG/1
TABLET ORAL
Qty: 30 TABLET | Refills: 5
Start: 2023-11-20 | End: 2024-05-22

## 2023-11-20 NOTE — TELEPHONE ENCOUNTER
Fax from [phone]. Breanna has had recurring falls. Medication may be a contributing factor.   Norco 5/325 1 tab BID, Ativan 2mg 1 q am (prn?), Loratadine 10mg 1 q pm (prn?) Please advise not examined

## 2023-11-27 ENCOUNTER — TELEPHONE (OUTPATIENT)
Dept: PRIMARY CARE CLINIC | Age: 88
End: 2023-11-27

## 2023-11-27 DIAGNOSIS — M15.9 PRIMARY OSTEOARTHRITIS INVOLVING MULTIPLE JOINTS: Primary | ICD-10-CM

## 2023-11-27 DIAGNOSIS — M43.16 SPONDYLOLISTHESIS OF LUMBAR REGION: ICD-10-CM

## 2023-11-27 RX ORDER — POLYETHYLENE GLYCOL 3350 17 G/17G
POWDER, FOR SOLUTION ORAL
Qty: 850 G | Refills: 10 | Status: SHIPPED | OUTPATIENT
Start: 2023-11-27

## 2023-11-27 RX ORDER — HYDROCODONE BITARTRATE AND ACETAMINOPHEN 5; 325 MG/1; MG/1
TABLET ORAL
Qty: 90 TABLET | Refills: 0 | Status: SHIPPED | OUTPATIENT
Start: 2023-11-27 | End: 2023-12-27

## 2023-11-27 NOTE — TELEPHONE ENCOUNTER
Fax from [phone]. We've been trying not to give Breanna Ativan 2mg PRN in case it is contributing to falls, but she is not doing well without it. Can we DC Ativan 2mg QD prn and start Ativan 1mg qd straight?

## 2023-11-30 ENCOUNTER — TELEPHONE (OUTPATIENT)
Dept: PRIMARY CARE CLINIC | Age: 88
End: 2023-11-30

## 2023-11-30 DIAGNOSIS — F41.9 ANXIETY DISORDER, UNSPECIFIED TYPE: Primary | ICD-10-CM

## 2023-11-30 RX ORDER — LORAZEPAM 1 MG/1
1 TABLET ORAL DAILY
Qty: 30 TABLET | Refills: 5 | Status: SHIPPED | OUTPATIENT
Start: 2023-11-30 | End: 2024-05-28

## 2023-11-30 NOTE — TELEPHONE ENCOUNTER
Fax from [phone]. The other day when you were there you ordered June Lorazepam 1 mg daily straight. (She also has Lorazepam 0.5mg daily prn, and Lorazepam 2mg daily prn) Please send Rx to [facility].  Thanks

## 2023-12-06 ENCOUNTER — OFFICE VISIT (OUTPATIENT)
Dept: PRIMARY CARE CLINIC | Age: 88
End: 2023-12-06
Payer: COMMERCIAL

## 2023-12-06 VITALS
SYSTOLIC BLOOD PRESSURE: 140 MMHG | HEART RATE: 100 BPM | DIASTOLIC BLOOD PRESSURE: 87 MMHG | BODY MASS INDEX: 26.64 KG/M2 | TEMPERATURE: 97.8 F | WEIGHT: 141 LBS | RESPIRATION RATE: 18 BRPM | OXYGEN SATURATION: 94 %

## 2023-12-06 DIAGNOSIS — N18.31 STAGE 3A CHRONIC KIDNEY DISEASE (HCC): ICD-10-CM

## 2023-12-06 DIAGNOSIS — R26.81 UNSTEADY GAIT: ICD-10-CM

## 2023-12-06 DIAGNOSIS — I10 PRIMARY HYPERTENSION: ICD-10-CM

## 2023-12-06 DIAGNOSIS — F03.90 SENILE DEMENTIA WITHOUT BEHAVIORAL DISTURBANCE (HCC): ICD-10-CM

## 2023-12-06 DIAGNOSIS — M15.9 PRIMARY OSTEOARTHRITIS INVOLVING MULTIPLE JOINTS: Primary | ICD-10-CM

## 2023-12-06 DIAGNOSIS — Z91.81 HISTORY OF FALLING, PRESENTING HAZARDS TO HEALTH: ICD-10-CM

## 2023-12-06 DIAGNOSIS — I25.10 CORONARY ARTERY DISEASE INVOLVING NATIVE CORONARY ARTERY OF NATIVE HEART WITHOUT ANGINA PECTORIS: ICD-10-CM

## 2023-12-06 DIAGNOSIS — G47.00 INSOMNIA, UNSPECIFIED TYPE: ICD-10-CM

## 2023-12-06 PROCEDURE — G8417 CALC BMI ABV UP PARAM F/U: HCPCS | Performed by: FAMILY MEDICINE

## 2023-12-06 PROCEDURE — 1123F ACP DISCUSS/DSCN MKR DOCD: CPT | Performed by: FAMILY MEDICINE

## 2023-12-06 PROCEDURE — G8484 FLU IMMUNIZE NO ADMIN: HCPCS | Performed by: FAMILY MEDICINE

## 2023-12-06 PROCEDURE — 1090F PRES/ABSN URINE INCON ASSESS: CPT | Performed by: FAMILY MEDICINE

## 2023-12-06 PROCEDURE — 99350 HOME/RES VST EST HIGH MDM 60: CPT | Performed by: FAMILY MEDICINE

## 2023-12-06 PROCEDURE — 1036F TOBACCO NON-USER: CPT | Performed by: FAMILY MEDICINE

## 2023-12-06 RX ORDER — BUSPIRONE HYDROCHLORIDE 5 MG/1
5 TABLET ORAL 2 TIMES DAILY
Qty: 60 TABLET | Refills: 11 | Status: SHIPPED | OUTPATIENT
Start: 2023-12-06

## 2023-12-06 RX ORDER — LANOLIN ALCOHOL/MO/W.PET/CERES
3 CREAM (GRAM) TOPICAL NIGHTLY
Qty: 30 TABLET | Refills: 11 | Status: SHIPPED | OUTPATIENT
Start: 2023-12-06

## 2023-12-07 RX ORDER — LANOLIN ALCOHOL/MO/W.PET/CERES
1 CREAM (GRAM) TOPICAL NIGHTLY
Qty: 1 TABLET | Refills: 10 | OUTPATIENT
Start: 2023-12-07

## 2023-12-07 RX ORDER — BUSPIRONE HYDROCHLORIDE 5 MG/1
5 TABLET ORAL 2 TIMES DAILY
Qty: 1 TABLET | Refills: 10 | OUTPATIENT
Start: 2023-12-07

## 2023-12-29 RX ORDER — LORATADINE 10 MG/1
TABLET ORAL
Qty: 31 TABLET | Refills: 10 | OUTPATIENT
Start: 2023-12-29

## 2023-12-30 DIAGNOSIS — F41.9 ANXIETY DISORDER, UNSPECIFIED TYPE: ICD-10-CM

## 2024-01-01 DIAGNOSIS — M15.9 PRIMARY OSTEOARTHRITIS INVOLVING MULTIPLE JOINTS: ICD-10-CM

## 2024-01-01 DIAGNOSIS — M43.16 SPONDYLOLISTHESIS OF LUMBAR REGION: ICD-10-CM

## 2024-01-02 DIAGNOSIS — M43.16 SPONDYLOLISTHESIS OF LUMBAR REGION: ICD-10-CM

## 2024-01-02 DIAGNOSIS — M15.9 PRIMARY OSTEOARTHRITIS INVOLVING MULTIPLE JOINTS: ICD-10-CM

## 2024-01-02 DIAGNOSIS — F41.9 ANXIETY DISORDER, UNSPECIFIED TYPE: ICD-10-CM

## 2024-01-02 RX ORDER — HYDROCODONE BITARTRATE AND ACETAMINOPHEN 5; 325 MG/1; MG/1
1 TABLET ORAL 2 TIMES DAILY
Qty: 90 TABLET | Refills: 0 | Status: SHIPPED | OUTPATIENT
Start: 2024-01-02 | End: 2024-02-01

## 2024-01-02 RX ORDER — LORAZEPAM 0.5 MG/1
TABLET ORAL
Qty: 30 TABLET | Refills: 4 | Status: SHIPPED | OUTPATIENT
Start: 2024-01-02 | End: 2024-06-30

## 2024-01-02 NOTE — PROGRESS NOTES
unspecified constipation type    PLAN:    Continue Buspar. Continue Ativan prn anxiety. Continue melatonin. Continue Norco,Tylenol and Aspercreme for arthritic pain. Continue Colace, MOM and Dulcolax for constipation. Smaller portions at meals. Check CBC, BMP every 3 months, next in February. Continue other meds as per Rx List. Recheck 1 month. 40 minutes spent on visit, 25 minutes involved education/counseling regarding dementia, GI, DJD, cardiac, pulmonary, and anemia disease processes, treatment options, meds and coordination of care.     Current Outpatient Medications   Medication Sig Dispense Refill    lisinopril (PRINIVIL;ZESTRIL) 10 MG tablet TAKE 1 TABLET BY MOUTH TWICE DAILY 60 tablet 10    dilTIAZem (CARDIZEM CD) 120 MG extended release capsule TAKE 1 CAPSULE BY MOUTH DAILY AT BEDTIME 30 capsule 10    HYDROcodone-acetaminophen (NORCO) 5-325 MG per tablet Take 1 tablet by mouth 2 times daily for 30 days. Max Daily Amount: 2 tablets 90 tablet 0    LORazepam (ATIVAN) 0.5 MG tablet TAKE ONE(1) TABLET BY MOUTH ONCE DAILY AS NEEDED  Strength: 0.5 mg 30 tablet 4    loratadine (CLARITIN) 10 MG tablet TAKE ONE(1) TABLET BY MOUTH ONCE DAILY IN THE MORNING.  Strength: 10 mg 31 tablet 11    busPIRone (BUSPAR) 5 MG tablet Take 1 tablet by mouth 2 times daily 60 tablet 11    melatonin 3 MG TABS tablet Take 1 tablet by mouth nightly 30 tablet 11    LORazepam (ATIVAN) 1 MG tablet Take 1 tablet by mouth daily for 180 days. Max Daily Amount: 1 mg 30 tablet 5    polyethylene glycol (GLYCOLAX) 17 GM/SCOOP powder DISSOLVE 17GRAM (1 CAPFUL) IN 4 TO 8OZ OF BEVERAGE AND TAKE BY MOUTH DAILY AS NEEDED 850 g 10    sertraline (ZOLOFT) 50 MG tablet Take 1 tablet by mouth daily 30 tablet 5    Oxymetazoline HCl (AFRIN NODRIP ORIGINAL NA) 1 spray by Nasal route 2 times daily as needed (nasal congestion)      phenylephrine-mineral oil-petrolatum (PREPARATION H) 0.25-14-74.9 % rectal ointment Place 1 Applicatorful rectally 3 times daily as

## 2024-01-03 ENCOUNTER — OFFICE VISIT (OUTPATIENT)
Dept: PRIMARY CARE CLINIC | Age: 89
End: 2024-01-03
Payer: COMMERCIAL

## 2024-01-03 VITALS
HEART RATE: 84 BPM | SYSTOLIC BLOOD PRESSURE: 142 MMHG | OXYGEN SATURATION: 94 % | TEMPERATURE: 97.1 F | DIASTOLIC BLOOD PRESSURE: 86 MMHG | RESPIRATION RATE: 20 BRPM | WEIGHT: 141 LBS | BODY MASS INDEX: 26.64 KG/M2

## 2024-01-03 DIAGNOSIS — M15.9 PRIMARY OSTEOARTHRITIS INVOLVING MULTIPLE JOINTS: ICD-10-CM

## 2024-01-03 DIAGNOSIS — F03.90 SENILE DEMENTIA WITHOUT BEHAVIORAL DISTURBANCE (HCC): Primary | ICD-10-CM

## 2024-01-03 DIAGNOSIS — K59.00 CONSTIPATION, UNSPECIFIED CONSTIPATION TYPE: ICD-10-CM

## 2024-01-03 DIAGNOSIS — I25.10 CORONARY ARTERY DISEASE INVOLVING NATIVE CORONARY ARTERY OF NATIVE HEART WITHOUT ANGINA PECTORIS: ICD-10-CM

## 2024-01-03 DIAGNOSIS — I10 PRIMARY HYPERTENSION: ICD-10-CM

## 2024-01-03 PROCEDURE — G8484 FLU IMMUNIZE NO ADMIN: HCPCS | Performed by: FAMILY MEDICINE

## 2024-01-03 PROCEDURE — 1123F ACP DISCUSS/DSCN MKR DOCD: CPT | Performed by: FAMILY MEDICINE

## 2024-01-03 PROCEDURE — 1036F TOBACCO NON-USER: CPT | Performed by: FAMILY MEDICINE

## 2024-01-03 PROCEDURE — G8417 CALC BMI ABV UP PARAM F/U: HCPCS | Performed by: FAMILY MEDICINE

## 2024-01-03 PROCEDURE — 99349 HOME/RES VST EST MOD MDM 40: CPT | Performed by: FAMILY MEDICINE

## 2024-01-03 PROCEDURE — 1090F PRES/ABSN URINE INCON ASSESS: CPT | Performed by: FAMILY MEDICINE

## 2024-01-03 RX ORDER — LORAZEPAM 0.5 MG/1
TABLET ORAL
Qty: 30 TABLET | Refills: 4 | OUTPATIENT
Start: 2024-01-03 | End: 2024-07-01

## 2024-01-03 RX ORDER — HYDROCODONE BITARTRATE AND ACETAMINOPHEN 5; 325 MG/1; MG/1
TABLET ORAL
Qty: 90 TABLET | OUTPATIENT
Start: 2024-01-03

## 2024-01-08 DIAGNOSIS — F41.9 ANXIETY DISORDER, UNSPECIFIED TYPE: ICD-10-CM

## 2024-01-08 LAB
ALBUMIN SERPL-MCNC: 4 G/DL (ref 3.5–5.2)
ALP SERPL-CCNC: 99 U/L (ref 35–104)
ALT SERPL-CCNC: 12 U/L (ref 0–32)
ANION GAP SERPL CALCULATED.3IONS-SCNC: 13 MMOL/L (ref 7–16)
AST SERPL-CCNC: 16 U/L (ref 0–31)
BASOPHILS # BLD: 0.03 K/UL (ref 0–0.2)
BASOPHILS NFR BLD: 1 % (ref 0–2)
BILIRUB SERPL-MCNC: 0.5 MG/DL (ref 0–1.2)
BUN SERPL-MCNC: 16 MG/DL (ref 6–23)
CALCIUM SERPL-MCNC: 9.2 MG/DL (ref 8.6–10.2)
CHLORIDE SERPL-SCNC: 99 MMOL/L (ref 98–107)
CO2 SERPL-SCNC: 20 MMOL/L (ref 22–29)
CREAT SERPL-MCNC: 0.9 MG/DL (ref 0.5–1)
EOSINOPHIL # BLD: 0.12 K/UL (ref 0.05–0.5)
EOSINOPHILS RELATIVE PERCENT: 2 % (ref 0–6)
ERYTHROCYTE [DISTWIDTH] IN BLOOD BY AUTOMATED COUNT: 14.9 % (ref 11.5–15)
GFR SERPL CREATININE-BSD FRML MDRD: >60 ML/MIN/1.73M2
GLUCOSE SERPL-MCNC: 90 MG/DL (ref 74–99)
HCT VFR BLD AUTO: 34.3 % (ref 34–48)
HGB BLD-MCNC: 11 G/DL (ref 11.5–15.5)
IMM GRANULOCYTES # BLD AUTO: <0.03 K/UL (ref 0–0.58)
IMM GRANULOCYTES NFR BLD: 0 % (ref 0–5)
LYMPHOCYTES NFR BLD: 1.69 K/UL (ref 1.5–4)
LYMPHOCYTES RELATIVE PERCENT: 31 % (ref 20–42)
MCH RBC QN AUTO: 26.8 PG (ref 26–35)
MCHC RBC AUTO-ENTMCNC: 32.1 G/DL (ref 32–34.5)
MCV RBC AUTO: 83.7 FL (ref 80–99.9)
MONOCYTES NFR BLD: 0.9 K/UL (ref 0.1–0.95)
MONOCYTES NFR BLD: 16 % (ref 2–12)
NEUTROPHILS NFR BLD: 50 % (ref 43–80)
NEUTS SEG NFR BLD: 2.76 K/UL (ref 1.8–7.3)
PLATELET # BLD AUTO: 321 K/UL (ref 130–450)
PMV BLD AUTO: 10.8 FL (ref 7–12)
POTASSIUM SERPL-SCNC: 4.2 MMOL/L (ref 3.5–5)
PROT SERPL-MCNC: 7.1 G/DL (ref 6.4–8.3)
RBC # BLD AUTO: 4.1 M/UL (ref 3.5–5.5)
SODIUM SERPL-SCNC: 132 MMOL/L (ref 132–146)
TSH SERPL DL<=0.05 MIU/L-ACNC: 1.49 UIU/ML (ref 0.27–4.2)
WBC OTHER # BLD: 5.5 K/UL (ref 4.5–11.5)

## 2024-01-08 RX ORDER — LORAZEPAM 0.5 MG/1
TABLET ORAL
Qty: 30 TABLET | Refills: 5 | Status: SHIPPED | OUTPATIENT
Start: 2024-01-08 | End: 2024-07-08

## 2024-01-18 RX ORDER — ASPIRIN 81 MG/1
81 TABLET ORAL DAILY
Qty: 31 TABLET | Refills: 11 | Status: SHIPPED | OUTPATIENT
Start: 2024-01-18

## 2024-01-18 RX ORDER — ASPIRIN 81 MG/1
TABLET, COATED ORAL
Qty: 31 TABLET | Refills: 10 | Status: SHIPPED | OUTPATIENT
Start: 2024-01-18

## 2024-01-25 RX ORDER — LORATADINE 10 MG/1
TABLET ORAL
Qty: 30 TABLET | Refills: 11 | Status: SHIPPED | OUTPATIENT
Start: 2024-01-25

## 2024-01-30 NOTE — PROGRESS NOTES
daily for 180 days. Max Daily Amount: 1 mg 30 tablet 5    polyethylene glycol (GLYCOLAX) 17 GM/SCOOP powder DISSOLVE 17GRAM (1 CAPFUL) IN 4 TO 8OZ OF BEVERAGE AND TAKE BY MOUTH DAILY AS NEEDED 850 g 10    sertraline (ZOLOFT) 50 MG tablet Take 1 tablet by mouth daily 30 tablet 5    Oxymetazoline HCl (AFRIN NODRIP ORIGINAL NA) 1 spray by Nasal route 2 times daily as needed (nasal congestion)      phenylephrine-mineral oil-petrolatum (PREPARATION H) 0.25-14-74.9 % rectal ointment Place 1 Applicatorful rectally 3 times daily as needed for Hemorrhoids      vitamin C (ASCORBIC ACID) 500 MG tablet Take 1 tablet by mouth daily (with breakfast) (Patient taking differently: Take 1 tablet by mouth Daily with lunch) 30 tablet 11    rOPINIRole (REQUIP) 2 MG tablet Take 1 tablet by mouth nightly 90 tablet 3    docusate sodium (COLACE) 100 MG capsule Take 1 capsule by mouth 2 times daily      loperamide (IMODIUM A-D) 2 MG tablet Take 2 tablets by mouth 3 times daily as needed for Diarrhea (Patient taking differently: Take 1 tablet by mouth 3 times daily as needed for Diarrhea) 90 tablet 5    meclizine (ANTIVERT) 25 MG tablet Take 1 tablet by mouth 4 times daily as needed for Dizziness 30 tablet 5    acetaminophen (TYLENOL) 325 MG tablet Take 2 tablets by mouth nightly      Homeopathic Products (COLD-EEZE) LOZG Take 1 lozenge by mouth every 2 hours as needed (sore throat) (Patient taking differently: Take 1 lozenge by mouth every 2 hours as needed (sore throat) Per standing order) 84 lozenge 3    ipratropium (ATROVENT) 0.03 % nasal spray 2 sprays by Each Nostril route 3 times daily (Patient taking differently: 2 sprays by Each Nostril route 3 times daily as needed for Rhinitis) 30 mL 5    SM TUSSIN -10 MG/5ML SYRP TAKE 10ML BY MOUTH EVERY 4 HOURS AS NEEDED (Patient taking differently: Take 10 mLs by mouth every 4 hours as needed for Cough) 236 mL 11    acetaminophen (TYLENOL) 650 MG extended release tablet Take 1 tablet by

## 2024-01-31 ENCOUNTER — TELEPHONE (OUTPATIENT)
Dept: PRIMARY CARE CLINIC | Age: 89
End: 2024-01-31

## 2024-01-31 ENCOUNTER — OFFICE VISIT (OUTPATIENT)
Dept: PRIMARY CARE CLINIC | Age: 89
End: 2024-01-31
Payer: COMMERCIAL

## 2024-01-31 VITALS
SYSTOLIC BLOOD PRESSURE: 139 MMHG | OXYGEN SATURATION: 96 % | WEIGHT: 137 LBS | TEMPERATURE: 97.7 F | BODY MASS INDEX: 25.89 KG/M2 | RESPIRATION RATE: 18 BRPM | HEART RATE: 90 BPM | DIASTOLIC BLOOD PRESSURE: 89 MMHG

## 2024-01-31 DIAGNOSIS — N18.31 STAGE 3A CHRONIC KIDNEY DISEASE (HCC): ICD-10-CM

## 2024-01-31 DIAGNOSIS — F03.90 SENILE DEMENTIA WITHOUT BEHAVIORAL DISTURBANCE (HCC): Primary | ICD-10-CM

## 2024-01-31 DIAGNOSIS — D50.8 IRON DEFICIENCY ANEMIA SECONDARY TO INADEQUATE DIETARY IRON INTAKE: ICD-10-CM

## 2024-01-31 DIAGNOSIS — M15.9 PRIMARY OSTEOARTHRITIS INVOLVING MULTIPLE JOINTS: ICD-10-CM

## 2024-01-31 DIAGNOSIS — I25.10 CORONARY ARTERY DISEASE INVOLVING NATIVE CORONARY ARTERY OF NATIVE HEART WITHOUT ANGINA PECTORIS: ICD-10-CM

## 2024-01-31 DIAGNOSIS — I10 PRIMARY HYPERTENSION: ICD-10-CM

## 2024-01-31 DIAGNOSIS — F41.9 ANXIETY DISORDER, UNSPECIFIED TYPE: ICD-10-CM

## 2024-01-31 PROCEDURE — G8484 FLU IMMUNIZE NO ADMIN: HCPCS | Performed by: FAMILY MEDICINE

## 2024-01-31 PROCEDURE — 1036F TOBACCO NON-USER: CPT | Performed by: FAMILY MEDICINE

## 2024-01-31 PROCEDURE — 1123F ACP DISCUSS/DSCN MKR DOCD: CPT | Performed by: FAMILY MEDICINE

## 2024-01-31 PROCEDURE — G8417 CALC BMI ABV UP PARAM F/U: HCPCS | Performed by: FAMILY MEDICINE

## 2024-01-31 PROCEDURE — 99350 HOME/RES VST EST HIGH MDM 60: CPT | Performed by: FAMILY MEDICINE

## 2024-01-31 PROCEDURE — 1090F PRES/ABSN URINE INCON ASSESS: CPT | Performed by: FAMILY MEDICINE

## 2024-01-31 NOTE — TELEPHONE ENCOUNTER
Fax from [facility] staff needing clarification on June's Miralax order. Presently she's on 1 capful QD which she refuses. They want to know if you want 1.5 capful QD straight or prn?

## 2024-02-01 LAB
ALBUMIN SERPL-MCNC: 4.1 G/DL (ref 3.5–5.2)
ALP SERPL-CCNC: 97 U/L (ref 35–104)
ALT SERPL-CCNC: 11 U/L (ref 0–32)
ANION GAP SERPL CALCULATED.3IONS-SCNC: 14 MMOL/L (ref 7–16)
AST SERPL-CCNC: 18 U/L (ref 0–31)
BASOPHILS # BLD: 0.04 K/UL (ref 0–0.2)
BASOPHILS NFR BLD: 1 % (ref 0–2)
BILIRUB SERPL-MCNC: 0.4 MG/DL (ref 0–1.2)
BUN SERPL-MCNC: 15 MG/DL (ref 6–23)
CALCIUM SERPL-MCNC: 9.4 MG/DL (ref 8.6–10.2)
CHLORIDE SERPL-SCNC: 100 MMOL/L (ref 98–107)
CO2 SERPL-SCNC: 22 MMOL/L (ref 22–29)
CREAT SERPL-MCNC: 0.9 MG/DL (ref 0.5–1)
EOSINOPHIL # BLD: 0.15 K/UL (ref 0.05–0.5)
EOSINOPHILS RELATIVE PERCENT: 2 % (ref 0–6)
ERYTHROCYTE [DISTWIDTH] IN BLOOD BY AUTOMATED COUNT: 15.3 % (ref 11.5–15)
FERRITIN SERPL-MCNC: 43 NG/ML
GFR SERPL CREATININE-BSD FRML MDRD: >60 ML/MIN/1.73M2
GLUCOSE SERPL-MCNC: 82 MG/DL (ref 74–99)
HCT VFR BLD AUTO: 33.9 % (ref 34–48)
HGB BLD-MCNC: 10.6 G/DL (ref 11.5–15.5)
IMM GRANULOCYTES # BLD AUTO: <0.03 K/UL (ref 0–0.58)
IMM GRANULOCYTES NFR BLD: 0 % (ref 0–5)
LYMPHOCYTES NFR BLD: 2.11 K/UL (ref 1.5–4)
LYMPHOCYTES RELATIVE PERCENT: 33 % (ref 20–42)
MCH RBC QN AUTO: 26.2 PG (ref 26–35)
MCHC RBC AUTO-ENTMCNC: 31.3 G/DL (ref 32–34.5)
MCV RBC AUTO: 83.7 FL (ref 80–99.9)
MONOCYTES NFR BLD: 0.89 K/UL (ref 0.1–0.95)
MONOCYTES NFR BLD: 14 % (ref 2–12)
NEUTROPHILS NFR BLD: 50 % (ref 43–80)
NEUTS SEG NFR BLD: 3.14 K/UL (ref 1.8–7.3)
PLATELET # BLD AUTO: 313 K/UL (ref 130–450)
PMV BLD AUTO: 10.6 FL (ref 7–12)
POTASSIUM SERPL-SCNC: 4.3 MMOL/L (ref 3.5–5)
PROT SERPL-MCNC: 7.2 G/DL (ref 6.4–8.3)
RBC # BLD AUTO: 4.05 M/UL (ref 3.5–5.5)
SODIUM SERPL-SCNC: 136 MMOL/L (ref 132–146)
TSH SERPL DL<=0.05 MIU/L-ACNC: 1.86 UIU/ML (ref 0.27–4.2)
WBC OTHER # BLD: 6.4 K/UL (ref 4.5–11.5)

## 2024-02-09 DIAGNOSIS — M43.16 SPONDYLOLISTHESIS OF LUMBAR REGION: ICD-10-CM

## 2024-02-09 DIAGNOSIS — M15.9 PRIMARY OSTEOARTHRITIS INVOLVING MULTIPLE JOINTS: Primary | ICD-10-CM

## 2024-02-09 RX ORDER — HYDROCODONE BITARTRATE AND ACETAMINOPHEN 5; 325 MG/1; MG/1
TABLET ORAL
Qty: 90 TABLET | Refills: 0 | Status: SHIPPED | OUTPATIENT
Start: 2024-02-09 | End: 2024-03-10

## 2024-03-11 RX ORDER — BISACODYL 5 MG
TABLET, DELAYED RELEASE (ENTERIC COATED) ORAL
Qty: 60 TABLET | Refills: 5 | Status: SHIPPED | OUTPATIENT
Start: 2024-03-11

## 2024-04-17 LAB
BILIRUB UR QL STRIP: NEGATIVE
CLARITY UR: CLEAR
COLOR UR: YELLOW
GLUCOSE UR STRIP-MCNC: NEGATIVE MG/DL
HGB UR QL STRIP.AUTO: NEGATIVE
KETONES UR STRIP-MCNC: NEGATIVE MG/DL
LEUKOCYTE ESTERASE UR QL STRIP: ABNORMAL
NITRITE UR QL STRIP: NEGATIVE
PH UR STRIP: 6 [PH] (ref 5–9)
PROT UR STRIP-MCNC: NEGATIVE MG/DL
SP GR UR STRIP: 1.01 (ref 1–1.03)
UROBILINOGEN UR STRIP-ACNC: 0.2 EU/DL (ref 0–1)

## 2024-04-18 ENCOUNTER — HOSPITAL ENCOUNTER (EMERGENCY)
Age: 89
Discharge: HOME | End: 2024-04-19
Attending: EMERGENCY MEDICINE
Payer: COMMERCIAL
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
## 2024-04-18 DIAGNOSIS — S22.41XA CLOSED FRACTURE OF MULTIPLE RIBS OF RIGHT SIDE, INITIAL ENCOUNTER: ICD-10-CM

## 2024-04-18 DIAGNOSIS — S09.90XA CLOSED HEAD INJURY, INITIAL ENCOUNTER: ICD-10-CM

## 2024-04-18 LAB
ALBUMIN SERPL-MCNC: 4.3 G/DL (ref 3.5–5.2)
ALP SERPL-CCNC: 106 U/L (ref 35–104)
ALT SERPL-CCNC: 15 U/L (ref 0–32)
ANION GAP SERPL CALCULATED.3IONS-SCNC: 17 MMOL/L (ref 7–16)
AST SERPL-CCNC: 20 U/L (ref 0–31)
BILIRUB SERPL-MCNC: 0.5 MG/DL (ref 0–1.2)
BUN SERPL-MCNC: 15 MG/DL (ref 6–23)
CALCIUM SERPL-MCNC: 9.5 MG/DL (ref 8.6–10.2)
CHLORIDE SERPL-SCNC: 98 MMOL/L (ref 98–107)
CO2 SERPL-SCNC: 18 MMOL/L (ref 22–29)
CREAT SERPL-MCNC: 1 MG/DL (ref 0.5–1)
ERYTHROCYTE [DISTWIDTH] IN BLOOD BY AUTOMATED COUNT: 13.9 % (ref 11.5–15)
GFR SERPL CREATININE-BSD FRML MDRD: 56 ML/MIN/1.73M2
GLUCOSE SERPL-MCNC: 96 MG/DL (ref 74–99)
HCT VFR BLD AUTO: 29.7 % (ref 34–48)
HGB BLD-MCNC: 9.4 G/DL (ref 11.5–15.5)
MCH RBC QN AUTO: 25.3 PG (ref 26–35)
MCHC RBC AUTO-ENTMCNC: 31.6 G/DL (ref 32–34.5)
MCV RBC AUTO: 80.1 FL (ref 80–99.9)
MICROORGANISM SPEC CULT: NO GROWTH
PLATELET # BLD AUTO: 363 K/UL (ref 130–450)
PMV BLD AUTO: 11.2 FL (ref 7–12)
POTASSIUM SERPL-SCNC: 4.1 MMOL/L (ref 3.5–5)
PROT SERPL-MCNC: 7.4 G/DL (ref 6.4–8.3)
RBC # BLD AUTO: 3.71 M/UL (ref 3.5–5.5)
SODIUM SERPL-SCNC: 133 MMOL/L (ref 132–146)
SPECIMEN DESCRIPTION: NORMAL
WBC OTHER # BLD: 8 K/UL (ref 4.5–11.5)

## 2024-04-18 PROCEDURE — 99284 EMERGENCY DEPT VISIT MOD MDM: CPT

## 2024-04-18 RX ORDER — 0.9 % SODIUM CHLORIDE 0.9 %
1000 INTRAVENOUS SOLUTION INTRAVENOUS ONCE
Status: COMPLETED | OUTPATIENT
Start: 2024-04-18 | End: 2024-04-19

## 2024-04-18 ASSESSMENT — PAIN - FUNCTIONAL ASSESSMENT: PAIN_FUNCTIONAL_ASSESSMENT: NONE - DENIES PAIN

## 2024-04-19 ENCOUNTER — APPOINTMENT (OUTPATIENT)
Dept: CT IMAGING | Age: 89
End: 2024-04-19
Payer: COMMERCIAL

## 2024-04-19 VITALS
WEIGHT: 140 LBS | HEART RATE: 86 BPM | RESPIRATION RATE: 29 BRPM | DIASTOLIC BLOOD PRESSURE: 75 MMHG | BODY MASS INDEX: 25.76 KG/M2 | TEMPERATURE: 97.3 F | SYSTOLIC BLOOD PRESSURE: 175 MMHG | OXYGEN SATURATION: 96 % | HEIGHT: 62 IN

## 2024-04-19 LAB
ANION GAP SERPL CALCULATED.3IONS-SCNC: 12 MMOL/L (ref 7–16)
BASOPHILS # BLD: 0.03 K/UL (ref 0–0.2)
BASOPHILS NFR BLD: 0 % (ref 0–2)
BUN SERPL-MCNC: 21 MG/DL (ref 6–23)
CALCIUM SERPL-MCNC: 8.8 MG/DL (ref 8.6–10.2)
CHLORIDE SERPL-SCNC: 100 MMOL/L (ref 98–107)
CO2 SERPL-SCNC: 23 MMOL/L (ref 22–29)
CREAT SERPL-MCNC: 1 MG/DL (ref 0.5–1)
EKG ATRIAL RATE: 87 BPM
EKG P AXIS: 54 DEGREES
EKG P-R INTERVAL: 166 MS
EKG Q-T INTERVAL: 388 MS
EKG QRS DURATION: 80 MS
EKG QTC CALCULATION (BAZETT): 466 MS
EKG R AXIS: 2 DEGREES
EKG T AXIS: 93 DEGREES
EKG VENTRICULAR RATE: 87 BPM
EOSINOPHIL # BLD: 0.11 K/UL (ref 0.05–0.5)
EOSINOPHILS RELATIVE PERCENT: 2 % (ref 0–6)
ERYTHROCYTE [DISTWIDTH] IN BLOOD BY AUTOMATED COUNT: 14 % (ref 11.5–15)
GFR SERPL CREATININE-BSD FRML MDRD: 50 ML/MIN/1.73M2
GLUCOSE SERPL-MCNC: 104 MG/DL (ref 74–99)
HCT VFR BLD AUTO: 28.6 % (ref 34–48)
HGB BLD-MCNC: 8.7 G/DL (ref 11.5–15.5)
IMM GRANULOCYTES # BLD AUTO: <0.03 K/UL (ref 0–0.58)
IMM GRANULOCYTES NFR BLD: 0 % (ref 0–5)
LYMPHOCYTES NFR BLD: 1.94 K/UL (ref 1.5–4)
LYMPHOCYTES RELATIVE PERCENT: 28 % (ref 20–42)
MCH RBC QN AUTO: 25.3 PG (ref 26–35)
MCHC RBC AUTO-ENTMCNC: 30.4 G/DL (ref 32–34.5)
MCV RBC AUTO: 83.1 FL (ref 80–99.9)
MONOCYTES NFR BLD: 1.14 K/UL (ref 0.1–0.95)
MONOCYTES NFR BLD: 16 % (ref 2–12)
NEUTROPHILS NFR BLD: 54 % (ref 43–80)
NEUTS SEG NFR BLD: 3.73 K/UL (ref 1.8–7.3)
PLATELET # BLD AUTO: 309 K/UL (ref 130–450)
PMV BLD AUTO: 10.6 FL (ref 7–12)
POTASSIUM SERPL-SCNC: 4.2 MMOL/L (ref 3.5–5)
RBC # BLD AUTO: 3.44 M/UL (ref 3.5–5.5)
SODIUM SERPL-SCNC: 135 MMOL/L (ref 132–146)
TROPONIN I SERPL HS-MCNC: 19 NG/L (ref 0–9)
TROPONIN I SERPL HS-MCNC: 20 NG/L (ref 0–9)
WBC OTHER # BLD: 7 K/UL (ref 4.5–11.5)

## 2024-04-19 PROCEDURE — 2580000003 HC RX 258: Performed by: EMERGENCY MEDICINE

## 2024-04-19 PROCEDURE — 93010 ELECTROCARDIOGRAM REPORT: CPT | Performed by: INTERNAL MEDICINE

## 2024-04-19 PROCEDURE — 84484 ASSAY OF TROPONIN QUANT: CPT

## 2024-04-19 PROCEDURE — 71250 CT THORAX DX C-: CPT

## 2024-04-19 PROCEDURE — 70450 CT HEAD/BRAIN W/O DYE: CPT

## 2024-04-19 PROCEDURE — 80048 BASIC METABOLIC PNL TOTAL CA: CPT

## 2024-04-19 PROCEDURE — 85025 COMPLETE CBC W/AUTO DIFF WBC: CPT

## 2024-04-19 PROCEDURE — 70486 CT MAXILLOFACIAL W/O DYE: CPT

## 2024-04-19 PROCEDURE — 72125 CT NECK SPINE W/O DYE: CPT

## 2024-04-19 PROCEDURE — 93005 ELECTROCARDIOGRAM TRACING: CPT | Performed by: EMERGENCY MEDICINE

## 2024-04-19 RX ADMIN — SODIUM CHLORIDE 1000 ML: 9 INJECTION, SOLUTION INTRAVENOUS at 00:08

## 2024-04-19 NOTE — ED PROVIDER NOTES
placement.  They politely declined as they have physical therapy outpatient and feel comfortable with the nursing staff that they have watching after the patient.  Therefore patient be discharged back to her facility.  Return precautions given.  Patient and patient's son agree with plan.    CONSULTS: (Who and What was discussed)  None        I am the Primary Clinician of Record.    FINAL IMPRESSION      1. Fall, initial encounter    2. Closed head injury, initial encounter    3. Closed fracture of multiple ribs of right side, initial encounter          DISPOSITION/PLAN     DISPOSITION Decision To Discharge 04/19/2024 01:35:04 AM      PATIENT REFERRED TO:  Eric Lal MD  [address] [place] [address]  [phone]    Schedule an appointment as soon as possible for a visit in 2 days        DISCHARGE MEDICATIONS:  New Prescriptions    No medications on file       DISCONTINUED MEDICATIONS:  Discontinued Medications    No medications on file              (Please note that portions of this note were completed with a voice recognition program.  Efforts were made to edit the dictations but occasionally words are mis-transcribed.)    Carlton Lucero DO (electronically signed)            Carlton Lucero DO  04/19/24 0139

## 2024-04-23 LAB
25(OH)D3 SERPL-MCNC: 15.3 NG/ML (ref 30–100)
ALBUMIN SERPL-MCNC: 3.9 G/DL (ref 3.5–5.2)
ALP SERPL-CCNC: 88 U/L (ref 35–104)
ALT SERPL-CCNC: 13 U/L (ref 0–32)
AMMONIA PLAS-SCNC: 24 UMOL/L (ref 11–51)
ANION GAP SERPL CALCULATED.3IONS-SCNC: 12 MMOL/L (ref 7–16)
AST SERPL-CCNC: 19 U/L (ref 0–31)
BASOPHILS # BLD: 0.05 K/UL (ref 0–0.2)
BASOPHILS NFR BLD: 1 % (ref 0–2)
BILIRUB SERPL-MCNC: 0.3 MG/DL (ref 0–1.2)
BNP SERPL-MCNC: 961 PG/ML (ref 0–450)
BUN SERPL-MCNC: 19 MG/DL (ref 6–23)
CALCIUM SERPL-MCNC: 9 MG/DL (ref 8.6–10.2)
CHLORIDE SERPL-SCNC: 102 MMOL/L (ref 98–107)
CHOLEST SERPL-MCNC: 196 MG/DL
CO2 SERPL-SCNC: 20 MMOL/L (ref 22–29)
CREAT SERPL-MCNC: 1.1 MG/DL (ref 0.5–1)
EOSINOPHIL # BLD: 0.16 K/UL (ref 0.05–0.5)
EOSINOPHILS RELATIVE PERCENT: 3 % (ref 0–6)
ERYTHROCYTE [DISTWIDTH] IN BLOOD BY AUTOMATED COUNT: 14.2 % (ref 11.5–15)
FOLATE SERPL-MCNC: 18.1 NG/ML (ref 4.8–24.2)
GFR SERPL CREATININE-BSD FRML MDRD: 49 ML/MIN/1.73M2
GLUCOSE SERPL-MCNC: 96 MG/DL (ref 74–99)
HBA1C MFR BLD: 5.9 % (ref 4–5.6)
HCT VFR BLD AUTO: 27.5 % (ref 34–48)
HDLC SERPL-MCNC: 57 MG/DL
HGB BLD-MCNC: 8.4 G/DL (ref 11.5–15.5)
IMM GRANULOCYTES # BLD AUTO: <0.03 K/UL (ref 0–0.58)
IMM GRANULOCYTES NFR BLD: 0 % (ref 0–5)
IRON SERPL-MCNC: 25 UG/DL (ref 37–145)
LDLC SERPL CALC-MCNC: 115 MG/DL
LYMPHOCYTES NFR BLD: 1.58 K/UL (ref 1.5–4)
LYMPHOCYTES RELATIVE PERCENT: 30 % (ref 20–42)
MCH RBC QN AUTO: 24.8 PG (ref 26–35)
MCHC RBC AUTO-ENTMCNC: 30.5 G/DL (ref 32–34.5)
MCV RBC AUTO: 81.1 FL (ref 80–99.9)
MONOCYTES NFR BLD: 0.86 K/UL (ref 0.1–0.95)
MONOCYTES NFR BLD: 16 % (ref 2–12)
NEUTROPHILS NFR BLD: 50 % (ref 43–80)
NEUTS SEG NFR BLD: 2.6 K/UL (ref 1.8–7.3)
PLATELET # BLD AUTO: 294 K/UL (ref 130–450)
PMV BLD AUTO: 10.8 FL (ref 7–12)
POTASSIUM SERPL-SCNC: 4.3 MMOL/L (ref 3.5–5)
PROT SERPL-MCNC: 6.5 G/DL (ref 6.4–8.3)
RBC # BLD AUTO: 3.39 M/UL (ref 3.5–5.5)
SODIUM SERPL-SCNC: 134 MMOL/L (ref 132–146)
T4 SERPL-MCNC: 5.6 UG/DL (ref 4.5–11.7)
TRIGL SERPL-MCNC: 118 MG/DL
TSH SERPL DL<=0.05 MIU/L-ACNC: 1.69 UIU/ML (ref 0.27–4.2)
VIT B12 SERPL-MCNC: 631 PG/ML (ref 211–946)
VLDLC SERPL CALC-MCNC: 24 MG/DL
WBC OTHER # BLD: 5.3 K/UL (ref 4.5–11.5)

## 2024-04-30 LAB
ALBUMIN SERPL-MCNC: 3.9 G/DL (ref 3.5–5.2)
ALP SERPL-CCNC: 95 U/L (ref 35–104)
ALT SERPL-CCNC: 10 U/L (ref 0–32)
ANION GAP SERPL CALCULATED.3IONS-SCNC: 14 MMOL/L (ref 7–16)
AST SERPL-CCNC: 14 U/L (ref 0–31)
BASOPHILS # BLD: 0.04 K/UL (ref 0–0.2)
BASOPHILS NFR BLD: 1 % (ref 0–2)
BILIRUB SERPL-MCNC: 0.3 MG/DL (ref 0–1.2)
BUN SERPL-MCNC: 33 MG/DL (ref 6–23)
CALCIUM SERPL-MCNC: 9.3 MG/DL (ref 8.6–10.2)
CHLORIDE SERPL-SCNC: 105 MMOL/L (ref 98–107)
CO2 SERPL-SCNC: 22 MMOL/L (ref 22–29)
CREAT SERPL-MCNC: 1.4 MG/DL (ref 0.5–1)
EOSINOPHIL # BLD: 0.26 K/UL (ref 0.05–0.5)
EOSINOPHILS RELATIVE PERCENT: 4 % (ref 0–6)
ERYTHROCYTE [DISTWIDTH] IN BLOOD BY AUTOMATED COUNT: 14.6 % (ref 11.5–15)
GFR SERPL CREATININE-BSD FRML MDRD: 36 ML/MIN/1.73M2
GLUCOSE SERPL-MCNC: 90 MG/DL (ref 74–99)
HCT VFR BLD AUTO: 27.9 % (ref 34–48)
HGB BLD-MCNC: 8.5 G/DL (ref 11.5–15.5)
IMM GRANULOCYTES # BLD AUTO: <0.03 K/UL (ref 0–0.58)
IMM GRANULOCYTES NFR BLD: 0 % (ref 0–5)
LYMPHOCYTES NFR BLD: 1.47 K/UL (ref 1.5–4)
LYMPHOCYTES RELATIVE PERCENT: 21 % (ref 20–42)
MCH RBC QN AUTO: 25.1 PG (ref 26–35)
MCHC RBC AUTO-ENTMCNC: 30.5 G/DL (ref 32–34.5)
MCV RBC AUTO: 82.5 FL (ref 80–99.9)
MONOCYTES NFR BLD: 0.91 K/UL (ref 0.1–0.95)
MONOCYTES NFR BLD: 13 % (ref 2–12)
NEUTROPHILS NFR BLD: 62 % (ref 43–80)
NEUTS SEG NFR BLD: 4.38 K/UL (ref 1.8–7.3)
PLATELET # BLD AUTO: 287 K/UL (ref 130–450)
PMV BLD AUTO: 10.7 FL (ref 7–12)
POTASSIUM SERPL-SCNC: 4.9 MMOL/L (ref 3.5–5)
PROT SERPL-MCNC: 6.7 G/DL (ref 6.4–8.3)
RBC # BLD AUTO: 3.38 M/UL (ref 3.5–5.5)
SODIUM SERPL-SCNC: 141 MMOL/L (ref 132–146)
WBC OTHER # BLD: 7.1 K/UL (ref 4.5–11.5)

## 2024-07-15 ENCOUNTER — TELEPHONE (OUTPATIENT)
Dept: AUDIOLOGY | Age: 89
End: 2024-07-15

## 2024-07-15 NOTE — TELEPHONE ENCOUNTER
Patient son called and insurance told them we are in network for hearing testing so he called to get her an appointment.     Called son to discuss that I do not see a referral. Got his VM and left message to call back.

## 2024-11-08 ENCOUNTER — APPOINTMENT (OUTPATIENT)
Dept: CT IMAGING | Age: 89
DRG: 378 | End: 2024-11-08
Payer: COMMERCIAL

## 2024-11-08 ENCOUNTER — HOSPITAL ENCOUNTER (INPATIENT)
Age: 89
LOS: 4 days | Discharge: NURSING, REHAB OR LONG-TERM CARE | DRG: 378 | End: 2024-11-15
Attending: STUDENT IN AN ORGANIZED HEALTH CARE EDUCATION/TRAINING PROGRAM | Admitting: HOSPITALIST
Payer: COMMERCIAL

## 2024-11-08 ENCOUNTER — APPOINTMENT (OUTPATIENT)
Dept: GENERAL RADIOLOGY | Age: 89
DRG: 378 | End: 2024-11-08
Payer: COMMERCIAL

## 2024-11-08 DIAGNOSIS — D64.9 ANEMIA REQUIRING TRANSFUSIONS: ICD-10-CM

## 2024-11-08 DIAGNOSIS — J90 BILATERAL PLEURAL EFFUSION: ICD-10-CM

## 2024-11-08 DIAGNOSIS — I24.89 DEMAND ISCHEMIA (HCC): ICD-10-CM

## 2024-11-08 DIAGNOSIS — N18.31 STAGE 3A CHRONIC KIDNEY DISEASE (HCC): ICD-10-CM

## 2024-11-08 DIAGNOSIS — K92.2 GASTROINTESTINAL HEMORRHAGE, UNSPECIFIED GASTROINTESTINAL HEMORRHAGE TYPE: ICD-10-CM

## 2024-11-08 DIAGNOSIS — R79.89 ELEVATED TROPONIN: ICD-10-CM

## 2024-11-08 DIAGNOSIS — E86.0 DEHYDRATION: ICD-10-CM

## 2024-11-08 DIAGNOSIS — D62 ACUTE BLOOD LOSS ANEMIA: Primary | ICD-10-CM

## 2024-11-08 DIAGNOSIS — J30.9 ALLERGIC RHINITIS, UNSPECIFIED SEASONALITY, UNSPECIFIED TRIGGER: ICD-10-CM

## 2024-11-08 DIAGNOSIS — N17.9 AKI (ACUTE KIDNEY INJURY) (HCC): ICD-10-CM

## 2024-11-08 LAB
ATYPICAL LYMPHOCYTE ABSOLUTE COUNT: 0.08 K/UL (ref 0–0.46)
ATYPICAL LYMPHOCYTES: 1 % (ref 0–4)
BASOPHILS # BLD: 0 K/UL (ref 0–0.2)
BASOPHILS NFR BLD: 0 % (ref 0–2)
BNP SERPL-MCNC: ABNORMAL PG/ML (ref 0–450)
D-DIMER QUANTITATIVE: 263 NG/ML DDU (ref 0–230)
EOSINOPHIL # BLD: 0.08 K/UL (ref 0.05–0.5)
EOSINOPHILS RELATIVE PERCENT: 1 % (ref 0–6)
ERYTHROCYTE [DISTWIDTH] IN BLOOD BY AUTOMATED COUNT: 17.8 % (ref 11.5–15)
HCT VFR BLD AUTO: 21 % (ref 34–48)
HGB BLD-MCNC: 6.8 G/DL (ref 11.5–15.5)
LYMPHOCYTES NFR BLD: 1.28 K/UL (ref 1.5–4)
LYMPHOCYTES RELATIVE PERCENT: 15 % (ref 20–42)
MAGNESIUM SERPL-MCNC: 2.6 MG/DL (ref 1.6–2.6)
MCH RBC QN AUTO: 30.1 PG (ref 26–35)
MCHC RBC AUTO-ENTMCNC: 32.4 G/DL (ref 32–34.5)
MCV RBC AUTO: 92.9 FL (ref 80–99.9)
MONOCYTES NFR BLD: 0.64 K/UL (ref 0.1–0.95)
MONOCYTES NFR BLD: 8 % (ref 2–12)
MYELOCYTES ABSOLUTE COUNT: 0.08 K/UL
MYELOCYTES: 1 %
NEUTROPHILS NFR BLD: 75 % (ref 43–80)
NEUTS SEG NFR BLD: 6.33 K/UL (ref 1.8–7.3)
NUCLEATED RED BLOOD CELLS: 5 PER 100 WBC
PLATELET # BLD AUTO: 79 K/UL (ref 130–450)
PLATELET CONFIRMATION: NORMAL
PMV BLD AUTO: 12.9 FL (ref 7–12)
RBC # BLD AUTO: 2.26 M/UL (ref 3.5–5.5)
RBC # BLD: ABNORMAL 10*6/UL
SARS-COV-2 RDRP RESP QL NAA+PROBE: NOT DETECTED
SPECIMEN DESCRIPTION: NORMAL
TROPONIN I SERPL HS-MCNC: 1001 NG/L (ref 0–9)
TROPONIN I SERPL HS-MCNC: 828 NG/L (ref 0–9)
WBC OTHER # BLD: 8.5 K/UL (ref 4.5–11.5)

## 2024-11-08 PROCEDURE — 71045 X-RAY EXAM CHEST 1 VIEW: CPT

## 2024-11-08 PROCEDURE — 99285 EMERGENCY DEPT VISIT HI MDM: CPT

## 2024-11-08 PROCEDURE — 93005 ELECTROCARDIOGRAM TRACING: CPT

## 2024-11-08 PROCEDURE — 96374 THER/PROPH/DIAG INJ IV PUSH: CPT

## 2024-11-08 PROCEDURE — 80053 COMPREHEN METABOLIC PANEL: CPT

## 2024-11-08 PROCEDURE — 70450 CT HEAD/BRAIN W/O DYE: CPT

## 2024-11-08 PROCEDURE — 96375 TX/PRO/DX INJ NEW DRUG ADDON: CPT

## 2024-11-08 PROCEDURE — 2580000003 HC RX 258: Performed by: STUDENT IN AN ORGANIZED HEALTH CARE EDUCATION/TRAINING PROGRAM

## 2024-11-08 PROCEDURE — 71275 CT ANGIOGRAPHY CHEST: CPT

## 2024-11-08 PROCEDURE — 6370000000 HC RX 637 (ALT 250 FOR IP)

## 2024-11-08 PROCEDURE — 86923 COMPATIBILITY TEST ELECTRIC: CPT

## 2024-11-08 PROCEDURE — 85379 FIBRIN DEGRADATION QUANT: CPT

## 2024-11-08 PROCEDURE — 86901 BLOOD TYPING SEROLOGIC RH(D): CPT

## 2024-11-08 PROCEDURE — 6360000002 HC RX W HCPCS: Performed by: STUDENT IN AN ORGANIZED HEALTH CARE EDUCATION/TRAINING PROGRAM

## 2024-11-08 PROCEDURE — 86850 RBC ANTIBODY SCREEN: CPT

## 2024-11-08 PROCEDURE — 87635 SARS-COV-2 COVID-19 AMP PRB: CPT

## 2024-11-08 PROCEDURE — 6360000004 HC RX CONTRAST MEDICATION: Performed by: RADIOLOGY

## 2024-11-08 PROCEDURE — 85025 COMPLETE CBC W/AUTO DIFF WBC: CPT

## 2024-11-08 PROCEDURE — 83735 ASSAY OF MAGNESIUM: CPT

## 2024-11-08 PROCEDURE — 84484 ASSAY OF TROPONIN QUANT: CPT

## 2024-11-08 PROCEDURE — 86900 BLOOD TYPING SEROLOGIC ABO: CPT

## 2024-11-08 PROCEDURE — 83880 ASSAY OF NATRIURETIC PEPTIDE: CPT

## 2024-11-08 RX ORDER — SODIUM CHLORIDE 9 MG/ML
INJECTION, SOLUTION INTRAVENOUS PRN
Status: DISCONTINUED | OUTPATIENT
Start: 2024-11-08 | End: 2024-11-16 | Stop reason: HOSPADM

## 2024-11-08 RX ORDER — LORAZEPAM 2 MG/ML
1 INJECTION INTRAMUSCULAR ONCE
Status: COMPLETED | OUTPATIENT
Start: 2024-11-08 | End: 2024-11-08

## 2024-11-08 RX ORDER — IOPAMIDOL 755 MG/ML
75 INJECTION, SOLUTION INTRAVASCULAR
Status: COMPLETED | OUTPATIENT
Start: 2024-11-08 | End: 2024-11-08

## 2024-11-08 RX ORDER — ACETAMINOPHEN 500 MG
1000 TABLET ORAL
Status: COMPLETED | OUTPATIENT
Start: 2024-11-08 | End: 2024-11-08

## 2024-11-08 RX ADMIN — LORAZEPAM 1 MG: 2 INJECTION INTRAMUSCULAR; INTRAVENOUS at 23:49

## 2024-11-08 RX ADMIN — ACETAMINOPHEN 1000 MG: 500 TABLET ORAL at 22:03

## 2024-11-08 RX ADMIN — IOPAMIDOL 75 ML: 755 INJECTION, SOLUTION INTRAVENOUS at 22:53

## 2024-11-08 RX ADMIN — PANTOPRAZOLE SODIUM 80 MG: 40 INJECTION, POWDER, FOR SOLUTION INTRAVENOUS at 23:49

## 2024-11-08 ASSESSMENT — PAIN SCALES - GENERAL: PAINLEVEL_OUTOF10: 0

## 2024-11-08 ASSESSMENT — PAIN - FUNCTIONAL ASSESSMENT: PAIN_FUNCTIONAL_ASSESSMENT: 0-10

## 2024-11-08 ASSESSMENT — PAIN SCALES - WONG BAKER: WONGBAKER_NUMERICALRESPONSE: NO HURT

## 2024-11-09 PROBLEM — R79.89 ELEVATED TROPONIN: Status: ACTIVE | Noted: 2024-11-09

## 2024-11-09 PROBLEM — D62 ACUTE BLOOD LOSS ANEMIA: Status: ACTIVE | Noted: 2024-11-09

## 2024-11-09 PROBLEM — K92.1 GASTROINTESTINAL HEMORRHAGE WITH MELENA: Status: ACTIVE | Noted: 2024-11-09

## 2024-11-09 LAB
ALBUMIN SERPL-MCNC: 3.7 G/DL (ref 3.5–5.2)
ALBUMIN SERPL-MCNC: 4 G/DL (ref 3.5–5.2)
ALP SERPL-CCNC: 64 U/L (ref 35–104)
ALP SERPL-CCNC: 68 U/L (ref 35–104)
ALT SERPL-CCNC: 46 U/L (ref 0–32)
ALT SERPL-CCNC: 48 U/L (ref 0–32)
ANION GAP SERPL CALCULATED.3IONS-SCNC: 12 MMOL/L (ref 7–16)
ANION GAP SERPL CALCULATED.3IONS-SCNC: 13 MMOL/L (ref 7–16)
AST SERPL-CCNC: 47 U/L (ref 0–31)
AST SERPL-CCNC: 50 U/L (ref 0–31)
BILIRUB SERPL-MCNC: 0.7 MG/DL (ref 0–1.2)
BILIRUB SERPL-MCNC: 0.8 MG/DL (ref 0–1.2)
BILIRUB UR QL STRIP: NEGATIVE
BUN SERPL-MCNC: 29 MG/DL (ref 6–23)
BUN SERPL-MCNC: 36 MG/DL (ref 6–23)
CALCIUM SERPL-MCNC: 8.6 MG/DL (ref 8.6–10.2)
CALCIUM SERPL-MCNC: 8.9 MG/DL (ref 8.6–10.2)
CHLORIDE SERPL-SCNC: 95 MMOL/L (ref 98–107)
CHLORIDE SERPL-SCNC: 98 MMOL/L (ref 98–107)
CLARITY UR: CLEAR
CO2 SERPL-SCNC: 19 MMOL/L (ref 22–29)
CO2 SERPL-SCNC: 20 MMOL/L (ref 22–29)
COLOR UR: YELLOW
CREAT SERPL-MCNC: 1.3 MG/DL (ref 0.5–1)
CREAT SERPL-MCNC: 1.5 MG/DL (ref 0.5–1)
FERRITIN SERPL-MCNC: 543 NG/ML
GFR, ESTIMATED: 32 ML/MIN/1.73M2
GFR, ESTIMATED: 38 ML/MIN/1.73M2
GLUCOSE SERPL-MCNC: 115 MG/DL (ref 74–99)
GLUCOSE SERPL-MCNC: 127 MG/DL (ref 74–99)
GLUCOSE UR STRIP-MCNC: NEGATIVE MG/DL
HCT VFR BLD AUTO: 22.6 % (ref 34–48)
HCT VFR BLD AUTO: 22.7 % (ref 34–48)
HCT VFR BLD AUTO: 23.5 % (ref 34–48)
HCT VFR BLD AUTO: 24.2 % (ref 34–48)
HGB BLD-MCNC: 7.2 G/DL (ref 11.5–15.5)
HGB BLD-MCNC: 7.5 G/DL (ref 11.5–15.5)
HGB BLD-MCNC: 7.6 G/DL (ref 11.5–15.5)
HGB BLD-MCNC: 7.9 G/DL (ref 11.5–15.5)
HGB UR QL STRIP.AUTO: NEGATIVE
INR PPP: 1.4
IRON SATN MFR SERPL: 88 % (ref 15–50)
IRON SERPL-MCNC: 235 UG/DL (ref 37–145)
KETONES UR STRIP-MCNC: ABNORMAL MG/DL
LEUKOCYTE ESTERASE UR QL STRIP: NEGATIVE
NITRITE UR QL STRIP: NEGATIVE
PARTIAL THROMBOPLASTIN TIME: 24.6 SEC (ref 24.5–35.1)
PH UR STRIP: 5.5 [PH] (ref 5–9)
POTASSIUM SERPL-SCNC: 4 MMOL/L (ref 3.5–5)
POTASSIUM SERPL-SCNC: 4.2 MMOL/L (ref 3.5–5)
PROT SERPL-MCNC: 5.9 G/DL (ref 6.4–8.3)
PROT SERPL-MCNC: 6.2 G/DL (ref 6.4–8.3)
PROT UR STRIP-MCNC: NEGATIVE MG/DL
PROTHROMBIN TIME: 14.7 SEC (ref 9.3–12.4)
RBC #/AREA URNS HPF: ABNORMAL /HPF
SODIUM SERPL-SCNC: 128 MMOL/L (ref 132–146)
SODIUM SERPL-SCNC: 129 MMOL/L (ref 132–146)
SP GR UR STRIP: 1.01 (ref 1–1.03)
TIBC SERPL-MCNC: 266 UG/DL (ref 250–450)
TROPONIN I SERPL HS-MCNC: 1014 NG/L (ref 0–9)
TROPONIN I SERPL HS-MCNC: 946 NG/L (ref 0–9)
UROBILINOGEN UR STRIP-ACNC: 0.2 EU/DL (ref 0–1)
WBC #/AREA URNS HPF: ABNORMAL /HPF

## 2024-11-09 PROCEDURE — 93005 ELECTROCARDIOGRAM TRACING: CPT | Performed by: NURSE PRACTITIONER

## 2024-11-09 PROCEDURE — 6360000002 HC RX W HCPCS: Performed by: STUDENT IN AN ORGANIZED HEALTH CARE EDUCATION/TRAINING PROGRAM

## 2024-11-09 PROCEDURE — 30233N1 TRANSFUSION OF NONAUTOLOGOUS RED BLOOD CELLS INTO PERIPHERAL VEIN, PERCUTANEOUS APPROACH: ICD-10-PCS | Performed by: INTERNAL MEDICINE

## 2024-11-09 PROCEDURE — 83550 IRON BINDING TEST: CPT

## 2024-11-09 PROCEDURE — 85610 PROTHROMBIN TIME: CPT

## 2024-11-09 PROCEDURE — 6370000000 HC RX 637 (ALT 250 FOR IP): Performed by: NURSE PRACTITIONER

## 2024-11-09 PROCEDURE — 87086 URINE CULTURE/COLONY COUNT: CPT

## 2024-11-09 PROCEDURE — P9016 RBC LEUKOCYTES REDUCED: HCPCS

## 2024-11-09 PROCEDURE — 85014 HEMATOCRIT: CPT

## 2024-11-09 PROCEDURE — 85018 HEMOGLOBIN: CPT

## 2024-11-09 PROCEDURE — 99222 1ST HOSP IP/OBS MODERATE 55: CPT | Performed by: INTERNAL MEDICINE

## 2024-11-09 PROCEDURE — 96376 TX/PRO/DX INJ SAME DRUG ADON: CPT

## 2024-11-09 PROCEDURE — 6360000002 HC RX W HCPCS: Performed by: NURSE PRACTITIONER

## 2024-11-09 PROCEDURE — G0378 HOSPITAL OBSERVATION PER HR: HCPCS

## 2024-11-09 PROCEDURE — 81001 URINALYSIS AUTO W/SCOPE: CPT

## 2024-11-09 PROCEDURE — APPSS60 APP SPLIT SHARED TIME 46-60 MINUTES

## 2024-11-09 PROCEDURE — 82728 ASSAY OF FERRITIN: CPT

## 2024-11-09 PROCEDURE — 83540 ASSAY OF IRON: CPT

## 2024-11-09 PROCEDURE — 80053 COMPREHEN METABOLIC PANEL: CPT

## 2024-11-09 PROCEDURE — 96375 TX/PRO/DX INJ NEW DRUG ADDON: CPT

## 2024-11-09 PROCEDURE — 85730 THROMBOPLASTIN TIME PARTIAL: CPT

## 2024-11-09 PROCEDURE — 36430 TRANSFUSION BLD/BLD COMPNT: CPT

## 2024-11-09 PROCEDURE — APPSS60 APP SPLIT SHARED TIME 46-60 MINUTES: Performed by: NURSE PRACTITIONER

## 2024-11-09 PROCEDURE — 84484 ASSAY OF TROPONIN QUANT: CPT

## 2024-11-09 PROCEDURE — 2580000003 HC RX 258: Performed by: NURSE PRACTITIONER

## 2024-11-09 RX ORDER — HYDROCODONE BITARTRATE AND ACETAMINOPHEN 5; 325 MG/1; MG/1
1 TABLET ORAL 2 TIMES DAILY
COMMUNITY

## 2024-11-09 RX ORDER — DILTIAZEM HYDROCHLORIDE 120 MG/1
120 CAPSULE, COATED, EXTENDED RELEASE ORAL DAILY
Status: DISCONTINUED | OUTPATIENT
Start: 2024-11-09 | End: 2024-11-10

## 2024-11-09 RX ORDER — SODIUM CHLORIDE 0.9 % (FLUSH) 0.9 %
5-40 SYRINGE (ML) INJECTION PRN
Status: DISCONTINUED | OUTPATIENT
Start: 2024-11-09 | End: 2024-11-16 | Stop reason: HOSPADM

## 2024-11-09 RX ORDER — SODIUM CHLORIDE 0.9 % (FLUSH) 0.9 %
5-40 SYRINGE (ML) INJECTION EVERY 12 HOURS SCHEDULED
Status: DISCONTINUED | OUTPATIENT
Start: 2024-11-09 | End: 2024-11-16 | Stop reason: HOSPADM

## 2024-11-09 RX ORDER — ACETAMINOPHEN 650 MG/1
650 SUPPOSITORY RECTAL EVERY 6 HOURS PRN
Status: DISCONTINUED | OUTPATIENT
Start: 2024-11-09 | End: 2024-11-16 | Stop reason: HOSPADM

## 2024-11-09 RX ORDER — MIRTAZAPINE 15 MG/1
7.5 TABLET, FILM COATED ORAL NIGHTLY
COMMUNITY

## 2024-11-09 RX ORDER — LORAZEPAM 2 MG/ML
1 INJECTION INTRAMUSCULAR ONCE
Status: COMPLETED | OUTPATIENT
Start: 2024-11-09 | End: 2024-11-09

## 2024-11-09 RX ORDER — MECLIZINE HCL 12.5 MG 12.5 MG/1
25 TABLET ORAL 4 TIMES DAILY PRN
Status: DISCONTINUED | OUTPATIENT
Start: 2024-11-09 | End: 2024-11-16 | Stop reason: HOSPADM

## 2024-11-09 RX ORDER — CETIRIZINE HYDROCHLORIDE 10 MG/1
5 TABLET ORAL DAILY
Status: DISCONTINUED | OUTPATIENT
Start: 2024-11-09 | End: 2024-11-16 | Stop reason: HOSPADM

## 2024-11-09 RX ORDER — LORAZEPAM 0.5 MG/1
0.5 TABLET ORAL DAILY
COMMUNITY

## 2024-11-09 RX ORDER — SODIUM CHLORIDE 9 MG/ML
INJECTION, SOLUTION INTRAVENOUS PRN
Status: DISCONTINUED | OUTPATIENT
Start: 2024-11-09 | End: 2024-11-16 | Stop reason: HOSPADM

## 2024-11-09 RX ORDER — CALCIUM CARBONATE 500 MG/1
1 TABLET, CHEWABLE ORAL 3 TIMES DAILY PRN
Status: DISCONTINUED | OUTPATIENT
Start: 2024-11-09 | End: 2024-11-16 | Stop reason: HOSPADM

## 2024-11-09 RX ORDER — FERROUS SULFATE 325(65) MG
325 TABLET ORAL
COMMUNITY

## 2024-11-09 RX ORDER — AMOXICILLIN 500 MG/1
500 CAPSULE ORAL 2 TIMES DAILY
Status: ON HOLD | COMMUNITY
Start: 2024-11-05 | End: 2024-11-15 | Stop reason: HOSPADM

## 2024-11-09 RX ORDER — LISINOPRIL 10 MG/1
10 TABLET ORAL 2 TIMES DAILY
Status: DISCONTINUED | OUTPATIENT
Start: 2024-11-09 | End: 2024-11-16 | Stop reason: HOSPADM

## 2024-11-09 RX ORDER — HYDROCODONE BITARTRATE AND ACETAMINOPHEN 5; 325 MG/1; MG/1
1 TABLET ORAL DAILY PRN
COMMUNITY

## 2024-11-09 RX ORDER — ROPINIROLE 2 MG/1
2 TABLET, FILM COATED ORAL NIGHTLY
Status: DISCONTINUED | OUTPATIENT
Start: 2024-11-09 | End: 2024-11-16 | Stop reason: HOSPADM

## 2024-11-09 RX ORDER — DOCUSATE SODIUM 100 MG/1
100 CAPSULE, LIQUID FILLED ORAL 2 TIMES DAILY
Status: DISCONTINUED | OUTPATIENT
Start: 2024-11-09 | End: 2024-11-16 | Stop reason: HOSPADM

## 2024-11-09 RX ORDER — BUSPIRONE HYDROCHLORIDE 5 MG/1
5 TABLET ORAL 2 TIMES DAILY
Status: DISCONTINUED | OUTPATIENT
Start: 2024-11-09 | End: 2024-11-16 | Stop reason: HOSPADM

## 2024-11-09 RX ORDER — ACETAMINOPHEN 325 MG/1
650 TABLET ORAL EVERY 6 HOURS PRN
Status: DISCONTINUED | OUTPATIENT
Start: 2024-11-09 | End: 2024-11-16 | Stop reason: HOSPADM

## 2024-11-09 RX ORDER — ONDANSETRON 2 MG/ML
4 INJECTION INTRAMUSCULAR; INTRAVENOUS EVERY 6 HOURS PRN
Status: DISCONTINUED | OUTPATIENT
Start: 2024-11-09 | End: 2024-11-16 | Stop reason: HOSPADM

## 2024-11-09 RX ORDER — ASPIRIN 81 MG/1
81 TABLET ORAL DAILY
Status: DISCONTINUED | OUTPATIENT
Start: 2024-11-09 | End: 2024-11-16 | Stop reason: HOSPADM

## 2024-11-09 RX ORDER — ONDANSETRON 4 MG/1
4 TABLET, ORALLY DISINTEGRATING ORAL EVERY 8 HOURS PRN
Status: DISCONTINUED | OUTPATIENT
Start: 2024-11-09 | End: 2024-11-16 | Stop reason: HOSPADM

## 2024-11-09 RX ADMIN — SODIUM CHLORIDE, PRESERVATIVE FREE 10 ML: 5 INJECTION INTRAVENOUS at 21:21

## 2024-11-09 RX ADMIN — LORAZEPAM 1 MG: 2 INJECTION INTRAMUSCULAR; INTRAVENOUS at 02:39

## 2024-11-09 RX ADMIN — ONDANSETRON 4 MG: 2 INJECTION INTRAMUSCULAR; INTRAVENOUS at 22:51

## 2024-11-09 RX ADMIN — SODIUM CHLORIDE, PRESERVATIVE FREE 10 ML: 5 INJECTION INTRAVENOUS at 08:43

## 2024-11-09 RX ADMIN — SODIUM CHLORIDE, PRESERVATIVE FREE 40 MG: 5 INJECTION INTRAVENOUS at 08:42

## 2024-11-09 RX ADMIN — BUSPIRONE HYDROCHLORIDE 5 MG: 5 TABLET ORAL at 21:09

## 2024-11-09 RX ADMIN — DILTIAZEM HYDROCHLORIDE 120 MG: 120 CAPSULE, COATED, EXTENDED RELEASE ORAL at 08:43

## 2024-11-09 RX ADMIN — SODIUM CHLORIDE, PRESERVATIVE FREE 40 MG: 5 INJECTION INTRAVENOUS at 21:09

## 2024-11-09 RX ADMIN — ROPINIROLE HYDROCHLORIDE 2 MG: 2 TABLET, FILM COATED ORAL at 21:12

## 2024-11-09 RX ADMIN — SERTRALINE HYDROCHLORIDE 50 MG: 50 TABLET ORAL at 08:46

## 2024-11-09 RX ADMIN — BUSPIRONE HYDROCHLORIDE 5 MG: 5 TABLET ORAL at 08:46

## 2024-11-09 RX ADMIN — Medication 3 MG: at 21:09

## 2024-11-09 NOTE — PROGRESS NOTES
4 Eyes Skin Assessment     NAME:  Breanna Luz  YOB: 1931  MEDICAL RECORD NUMBER:  [id number]    The patient is being assessed for  Admission    I agree that at least one RN has performed a thorough Head to Toe Skin Assessment on the patient. ALL assessment sites listed below have been assessed.      Areas assessed by both nurses:    Head, Face, Ears, Shoulders, Back, Chest, Arms, Elbows, Hands, Sacrum. Buttock, Coccyx, Ischium, Legs. Feet and Heels, and Under Medical Devices         Does the Patient have a Wound? No noted wound(s)       Preet Prevention initiated by RN: No  Wound Care Orders initiated by RN: No    Pressure Injury (Stage 3,4, Unstageable, DTI, NWPT, and Complex wounds) if present, place Wound referral order by RN under : No    New Ostomies, if present place, Ostomy referral order under : No     Nurse 1 eSignature: Electronically signed by Elizabeth Szymanski RN on 11/9/24 at 4:51 AM EST    **SHARE this note so that the co-signing nurse can place an eSignature**    Nurse 2 eSignature: Electronically signed by Sherri Knox RN on 11/9/24 at 5:02 AM EST

## 2024-11-09 NOTE — ED PROVIDER NOTES
[facility] INTERNAL MEDICINE 2  EMERGENCY DEPARTMENT ENCOUNTER        Pt Name: Breanna Luz  MRN: [id number]  Birthdate 6/28/1931  Date of evaluation: 11/8/2024  Provider: Susy Varma MD  PCP: Mari, Pcp  Note Started: 9:19 PM EST 11/8/24    CHIEF COMPLAINT       Chief Complaint   Patient presents with    Shortness of Breath     Per EMS CXR at facility showed pulmonary edema, call for low pulse ox, however 97% on RA for EMS upon arrival and 96% on RA at triage.        HISTORY OF PRESENT ILLNESS: 1 or more Elements   History From: Patient, EMS    Limitations to history : None    Breanna Luz is a 89 y.o. female who presents for shortness of breath beginning this evening. Per EMS, they received a call for hypoxia.  Nursing home states patient was 79% on room air, and CXR showed pulmonary edema.  Sent patient in for further evaluation.  Per EMS, patient was 97% on room air upon their arrival.  Patient denies any chest pain at this time.  She states she was feeling short of breath earlier.  Notes some nausea as well.  Son at bedside states that patient has been more agitated recently and notes she appears pale. No recent fall or LOC. Pt is DNR-CCA.     Patient denies fever, chills, abdominal pain, vomiting, diarrhea.    Nursing Notes were all reviewed and agreed with or any disagreements were addressed in the HPI.        REVIEW OF EXTERNAL NOTES :       11/5/2012: Patient had echo done showing EF of 55 to 60%.    REVIEW OF SYSTEMS :         Positives and Pertinent negatives as per HPI.     SURGICAL HISTORY     Past Surgical History:   Procedure Laterality Date    ANGIOPLASTY      ECHO COMPL W DOP COLOR FLOW  11/7/2012         HIP FRACTURE SURGERY Left 5/25/15       CURRENTMEDICATIONS       Current Discharge Medication List        CONTINUE these medications which have NOT CHANGED    Details   lisinopril (PRINIVIL;ZESTRIL) 10 MG tablet TAKE 1 TABLET BY MOUTH TWICE DAILY  Qty: 60 tablet, Refills: 10               Medical Decision Making/Differential Diagnosis:    CC/HPI Summary, Social Determinants of health, Records Reviewed, DDx, testing done/not done, ED Course, Reassessment, disposition considerations/shared decision making with patient, consults, disposition:      ED Course as of 11/09/24 0424   Fri Nov 08, 2024 2129 EKG:  This EKG is signed and interpreted by me.    Rate: 105  Rhythm: Sinus  Interpretation: Normal sinus rhythm, tachycardic, normal axis, nonspecific changes with ST depressions in the anterior lateral leads, no acute elevations, QTc is 452  Comparison: no previous EKG available    [KG]      ED Course User Index  [KG] Zoë Douglass,         This is a 89-year-old female with history of HTN, HLD, CAD brought via EMS from nursing facility for shortness of breath.  Per nursing facility, patient was 79% on room air, and CXR showed pulmonary edema.  Upon EMS arrival, patient was 97% on room air, in no acute respiratory distress.  Patient states that she had been feeling short of breath today.  She denies any chest pain at this time. Pt is DNR CCA. Differential diagnosis includes CHF exacerbation, ACS, MI, pneumonia, dehydration, electrolyte abnormality, anemia, to name a few.  Patient is alert and oriented x 3.  Pale-appearing. No focal neurological deficits.  Heart is mildly tachycardic, regular rhythm.  Lungs are clear to auscultation.  Abdomen soft, nontender, nondistended.  No pretibial edema.  Imaging, EKG, UA, and labs obtained reviewed.  COVID-negative.  CXR shows no acute process. CMP shows no electrolyte abnormalities. WBC 8.5. Hgb resulted at 6.8. 1 unit PRBCs ordered.  BRENDA done resulting in dark tarry stools, hemoccult positive. Protonix ordered.  BNP resulted at 31,654. EKG shows new ST depressions with troponin elevated at 1001.  This is likely in the setting of demand ischemia.  Patient is not having any active chest pain. Due to low hemoglobin and black tarry stools, aspirin

## 2024-11-09 NOTE — CONSULTS
Gastroenterology Consult Note   Angelica BERG NP-C with Jakob Gonzalez D.O. Consult Note        Date of Service: 11/9/2024  Reason for Consult: GIB  Requesting Physician: Alondra Caceres APRN - CNP     CHIEF COMPLAINT: Oxygen saturation in the 70s on room air    History Obtained From:  electronic medical record    HISTORY OF PRESENT ILLNESS:       Breanna Luz is a 89 y.o. female with significant past medical history of anemia, C2 fracture, coronary artery disease, fracture of left hip, hypertension, hyperlipidemia, and malnutrition. Presenting to ED for short of breath admitted via ED for anemia.  Pt seen laying in bed.  Confused as to why she is here.  States her son brought her in.  Per EMR notes patient came in from facility for low oxygen levels but found to have 96% on room air upon presentation.  Patient with concern for fluid around the heart.  Occult positive in ER.  No family present at this time to inquire regarding prior EGD and/or colonoscopy.  Patient unable to recall any details.  No op notes located in [facility] or [facility].  Admission labs Sodium 128, chloride 95, carbon dioxide 20, BUN 36, creatinine 1.5, GFR 32, blood glucose 127, protein 6.2, BNP 31 654, troponin 1001, ALT 46, AST 50, protein 6.2, RBC 2.26, hemoglobin 6.8, hematocrit 21, MPV 12.9, RDW 17.8, platelets 79..  CXR: There are no signs of an acute cardiopulmonary process. CT head WO:No acute intracranial abnormality.  Pulmonary CTA: 1. No evidence of pulmonary embolism. 2. Trace bilateral pleural effusions and associated mild compressive atelectasis. 3. Cholelithiasis. Consultation for GIB.    Currently, pt reports to feeling fine.  No bleeding episodes since admission.  Last bowel movement was in the ER.  Labs today hemoglobin 7.2, hematocrit 22.6.  Other labs just ordered    Past Medical History:        Diagnosis Date    Anemia due to acute blood loss 5/28/2015    C2 cervical fracture (HCC)     CAD (coronary artery disease)  today  Bleeding scan today  Okay for clears after  Serial H&H, transfuse less than 7 per primary  Supportive care  Endoscopy/colonoscopy timing to be determined, pending clinical course and Endo availability  Trend labs  Will follow    Thank you very much for your consultation. We will follow closely with you.    Discussed with Dr. Gonzalez covering for Dr. Mae  Treatment plan developed by Dr. Lisa BERG, NP-C 11/9/2024 9:30 AM for Dr. Gonzalez    GI attending addendum:  The patient case was reviewed and discussed with the GI midlevel team.     Jakob Gonzalez,

## 2024-11-09 NOTE — CONSULTS
Inpatient Cardiology Consultation      Reason for Consult:  Elevated troponin/demand ischemia     Consulting Physician: Dr. Washington     Requesting Physician:  Alondra Caceres, OTIS-CNP    Date of Consultation: 11/9/2024    History of Present Illness:   Breanna Luz  is a 89 y.o. year old known remotely to [facility].  She last saw Dr. Gutierrez 5/24/2015 through inpatient consultation.    Patient presented to the ED on 11/8/2024 with complaints of shortness of breath beginning this evening. Per EMS, they received a call for hypoxia.  Nursing home states patient was 79% on room air, and CXR showed pulmonary edema. Per EMS, patient was 97% on room air upon their arrival.  On arrival blood pressure 104/49, heart rate 109, 96% on room air and afebrile.  Labs include sodium 128, potassium 4.0, BUN/creatinine 36/1.5, magnesium 2.6, troponin 1001> 828> 946> 1014, proBNP 31,654, WBC 8.5, Hgb 6.8, D-dimer 263.  CT head reveals no acute intracranial abnormality.  CT pulmonary revealed bilateral pleural effusions with no evidence of PE.  Chest XR reveals no acute process.  Patient received 1 unit PRBCs in the ED.  Meds in ED include Protonix 80 mg IV, Ativan 1 mg IV and Tylenol 1000 mg p.o.    At the time examination patient is lying in bed with her eyes closed and appears to be in no acute distress.  Patient states that she resides in a nursing home and ambulates with the use of a walker.  She states that typically she can ambulate without difficulty however yesterday she felt very fatigued and winded when walking and was unable to ambulate to the bathroom.  This prompted the nursing home to seek emergency evaluation.  She denies any chest pain, palpitations, abdominal pain, bloody stools.  She does report feeling increasingly fatigued with SILVA and some lightheadedness.    Please note: past medical records were reviewed per electronic medical record (EMR) - see detailed reports under Past Medical/ Surgical History.

## 2024-11-09 NOTE — PLAN OF CARE
Problem: Discharge Planning  Goal: Discharge to home or other facility with appropriate resources  Outcome: Progressing     Problem: Pain  Goal: Verbalizes/displays adequate comfort level or baseline comfort level  Outcome: Progressing     Problem: Safety - Adult  Goal: Free from fall injury  Outcome: Progressing     Problem: Confusion  Goal: Confusion, delirium, dementia, or psychosis is improved or at baseline  Description: INTERVENTIONS:  1. Assess for possible contributors to thought disturbance, including medications, impaired vision or hearing, underlying metabolic abnormalities, dehydration, psychiatric diagnoses, and notify attending LIP  2. [place] high risk fall precautions, as indicated  3. Provide frequent short contacts to provide reality reorientation, refocusing and direction  4. Decrease environmental stimuli, including noise as appropriate  5. Monitor and intervene to maintain adequate nutrition, hydration, elimination, sleep and activity  6. If unable to ensure safety without constant attention obtain sitter and review sitter guidelines with assigned personnel  7. Initiate Psychosocial CNS and Spiritual Care consult, as indicated  Outcome: Progressing     Problem: ABCDS Injury Assessment  Goal: Absence of physical injury  Outcome: Progressing     Problem: Skin/Tissue Integrity  Goal: Absence of new skin breakdown  Description: 1.  Monitor for areas of redness and/or skin breakdown  2.  Assess vascular access sites hourly  3.  Every 4-6 hours minimum:  Change oxygen saturation probe site  4.  Every 4-6 hours:  If on nasal continuous positive airway pressure, respiratory therapy assess nares and determine need for appliance change or resting period.  Outcome: Progressing

## 2024-11-09 NOTE — PLAN OF CARE
Nocturnist note reviewed, agree with plan and additions as below:    Exam  General Appearance: alert and oriented to person, place and time and in NAD, laying in bed under covers  Skin: warm and dry  Head: normocephalic and atraumatic  Eyes: PERRL, EOMI, conjunctivae normal  Neck: neck supple, trachea midline   Pulmonary/Chest: CTAB, no w/r/r, normal air movement, no respiratory distress  Cardiovascular: RRR, no murmurs  Abdomen: soft, non-tender, non-distended, normal bowel sounds, no masses or organomegaly  Extremities: no cyanosis, no clubbing and no edema  Neurologic: no cranial nerve deficit and speech normal    A/P  Acute blood loss anemia on DARREN- admit Hgb 6.8 improved 7.9 post transfusion, baseline in 4/2024 ~8.5, monitor and transfuse if less than 7  GI bleed- Hgb 6.8 on admit. Stool for occult blood positive in ED.  Consult GI, plan eventual EGD/c-scope timing tbd. Continue PPI twice daily  Hyponatremia- 128 on admit, monitor  Elevated troponin- likely demand ischemia from above, Trop trend 1000->828->946. Per ED note no ST depression seen on EKG. Cards following, no acute intervention  Elevated BNP- admit 31,654. Cards following. ECHO 20112 EF 55-60%  Stage 3a CKD- admit Cr 1.5, b/l appears ~1.1-1.4 4/2024. Monitor, likely related to blood loss as above      Dispo: anticipate home     NOTE: Portions of this report may have been transcribed using voice recognition software. Every effort was made to ensure accuracy; however, inadvertent computerized transcription errors may be present.  Electronically signed by Jimena Rowley MD on 11/9/2024 at 4:00 PM

## 2024-11-09 NOTE — H&P
[facility] Hospitalist [facility] History and Physical      CHIEF COMPLAINT:  Shortness of breath    History of Present Illness:  This is a 89 year old female with PMH significant for anemia, HTN, HLD, anxiety, CAD, DARREN, and CKD.  Patient to ED due to shortness of breath, weakness, dizziness, and chest pain.  Patient is a poor historian and hard of hearing.  Son at bedside and answers most all questions.  Patient lives at an assisted living home and normally gets around using her walker.  Had increased weakness and shortness of breath today.  Assisted-living reported patient being 79% on room air but when EMS arrived patient was 97% on room air.  Son reports vague complaints of chest pain on and off.  Also reports that patient stated she felt like she had a lump in her throat.  Denies recent illness, fever, chills, nausea/vomiting, abdominal pain, hematochezia, melena, hematemesis, and changes in bowels and bladder.    Labs remarkable for sodium 128, BUN/creatinine 36/1.5, CO2 20, chloride 95, GFR 32, BNP 31,654, ALT 46, AST 50, and H&H 6.8/21.0 with platelet count of 79.  Troponin is elevated at 1000 and then 828 and then 946.  D-dimer 263.  COVID-negative.  Urine negative for infection.  CT of the head showing no acute intercranial abnormality.  CTA pulmonary showing no PE, trace bilateral pleural effusions and associated mild compressive atelectasis, cholelithiasis.  Patient given Tylenol, Ativan x 2, and Protonix. Blood infusing now.       Informant(s) for H&P: Patient and chart review    REVIEW OF SYSTEMS:  A comprehensive review of systems was negative except for: what is in the HPI      PMH:  Past Medical History:   Diagnosis Date    Anemia due to acute blood loss 5/28/2015    C2 cervical fracture (HCC)     CAD (coronary artery disease) 5/26/2015    Fracture of left hip (HCC) 5/26/2015    HTN (hypertension) 11/7/2012    Hyperlipidemia     Hyperlipidemia 11/7/2012    Hypertension     Protein calorie  assessing patient, discussing plan of care with patient and family, discussing plan of care with collaborating physician, and documentation.       NOTE: This report was transcribed using voice recognition software. Every effort was made to ensure accuracy; however, inadvertent computerized transcription errors may be present.  Electronically signed by OTIS Poole CNP on 11/9/2024 at 3:16 AM

## 2024-11-09 NOTE — CARE COORDINATION
Social work / Discharge planning:          Patient is from [facility].    Will need PT/OT evaluations to assist in determining if patient can return to assisted living level of care.    Cardiology and GI consulted.    Discharge needs are undetermined at this time.   Electronically signed by SIENNA Barahona on 11/9/2024 at 11:47 AM

## 2024-11-09 NOTE — ED NOTES
Radiology Procedure Waiver   Name: Breanna Luz  : 1931  MRN: [id number]    Date:  24    Time: 10:50 PM EST    Benefits of immediately proceeding with radiology exam(s) without pre-testing outweigh the risks or are not indicated as specified below and therefore the following is/are being waived:    [x] Benefits of immediate radiology exam(s) outweigh any risk.                                               OR    Pre-exam testing is not indicated for the following reason(s):  [] Pregnancy test   [] Patients LMP on-time and regular.   [] Patient had Tubal Ligation or has other Contraception Device.   [] Patient  is Menopausal or Premenarcheal.    [] Patient had Full or Partial Hysterectomy.    [] Protocol for CT contrast allegry   [] Patient has tolerated well previously   [] Patient does not have a true allergy    [] MRI Questionnaire     [x] BUN/Creatinine   [] Patient age w/no hx of renal dysfunction.   [] Patient on Dialysis.   [] Recent Normal Labs.  Electronically signed by Zoë Douglass DO on 24 at 10:50 PM EST               Zoë Douglass DO  [id number] [phone]

## 2024-11-09 NOTE — PROGRESS NOTES
Nuc med called, pt unable to get scan unless actively having bloody Bms, no bloody stools noted today

## 2024-11-09 NOTE — ED NOTES
ED to Inpatient Handoff Report    Notified Kinjal that electronic handoff available and patient ready for transport to room 0419.    Safety Risks: Risk of falls    Patient in Restraints: no    Constant Observer or Patient : no    Telemetry Monitoring Ordered :Yes           Order to transfer to unit without monitor:NO    Last MEWS: 2 Time completed: 0304    Deterioration Index Score:   Predictive Model Details          29 (Normal)  Factor Value    Calculated 11/9/2024 03:21 49% Age 89 years old    Deterioration Index Model 14% Respiratory rate 19     10% Pulse 104     10% Hematocrit abnormal (21.0 %)     7% Sodium abnormal (128 mmol/L)     4% Systolic 105     3% BUN abnormal (36 mg/dL)     2% Platelet count abnormal (79 k/uL)     1% WBC count 8.5 k/uL     0% Pulse oximetry 98 %     0% Potassium 4.0 mmol/L     0% Temperature 97.8 °F (36.6 °C)        Vitals:    11/09/24 0138 11/09/24 0233 11/09/24 0247 11/09/24 0304   BP: (!) 119/58 114/68 117/60 105/85   Pulse: (!) 105 (!) 106 (!) 105 (!) 104   Resp: 26 24 19 19   Temp:    97.8 °F (36.6 °C)   TempSrc:    Axillary   SpO2: 98% 96% 98% 98%   Weight:       Height:             Opportunity for questions and clarification was provided.

## 2024-11-09 NOTE — ACP (ADVANCE CARE PLANNING)
Advance Care Planning   Healthcare Decision Maker:    Primary Decision Maker: [facility]uk[facility]Celestino [facility] - [phone]    Click here to complete Healthcare Decision Makers including selection of the Healthcare Decision Maker Relationship (ie \"Primary\").

## 2024-11-10 ENCOUNTER — APPOINTMENT (OUTPATIENT)
Dept: NUCLEAR MEDICINE | Age: 89
DRG: 378 | End: 2024-11-10
Payer: COMMERCIAL

## 2024-11-10 LAB
ABO/RH: NORMAL
ALBUMIN SERPL-MCNC: 3.7 G/DL (ref 3.5–5.2)
ALP SERPL-CCNC: 65 U/L (ref 35–104)
ALT SERPL-CCNC: 47 U/L (ref 0–32)
ANION GAP SERPL CALCULATED.3IONS-SCNC: 12 MMOL/L (ref 7–16)
ANTIBODY SCREEN: NEGATIVE
ARM BAND NUMBER: NORMAL
AST SERPL-CCNC: 43 U/L (ref 0–31)
BILIRUB SERPL-MCNC: 0.6 MG/DL (ref 0–1.2)
BLOOD BANK BLOOD PRODUCT EXPIRATION DATE: NORMAL
BLOOD BANK DISPENSE STATUS: NORMAL
BLOOD BANK ISBT PRODUCT BLOOD TYPE: 5100
BLOOD BANK PRODUCT CODE: NORMAL
BLOOD BANK SAMPLE EXPIRATION: NORMAL
BLOOD BANK UNIT TYPE AND RH: NORMAL
BPU ID: NORMAL
BUN SERPL-MCNC: 33 MG/DL (ref 6–23)
CALCIUM SERPL-MCNC: 8.5 MG/DL (ref 8.6–10.2)
CHLORIDE SERPL-SCNC: 99 MMOL/L (ref 98–107)
CO2 SERPL-SCNC: 19 MMOL/L (ref 22–29)
COMPONENT: NORMAL
CREAT SERPL-MCNC: 1.3 MG/DL (ref 0.5–1)
CROSSMATCH RESULT: NORMAL
GFR, ESTIMATED: 37 ML/MIN/1.73M2
GLUCOSE SERPL-MCNC: 134 MG/DL (ref 74–99)
HCT VFR BLD AUTO: 23.8 % (ref 34–48)
HGB BLD-MCNC: 7.5 G/DL (ref 11.5–15.5)
MICROORGANISM SPEC CULT: NO GROWTH
POTASSIUM SERPL-SCNC: 4.4 MMOL/L (ref 3.5–5)
PROT SERPL-MCNC: 5.7 G/DL (ref 6.4–8.3)
SERVICE CMNT-IMP: NORMAL
SODIUM SERPL-SCNC: 130 MMOL/L (ref 132–146)
SPECIMEN DESCRIPTION: NORMAL
TRANSFUSION STATUS: NORMAL
UNIT DIVISION: 0
UNIT ISSUE DATE/TIME: NORMAL

## 2024-11-10 PROCEDURE — 6360000002 HC RX W HCPCS: Performed by: NURSE PRACTITIONER

## 2024-11-10 PROCEDURE — 80053 COMPREHEN METABOLIC PANEL: CPT

## 2024-11-10 PROCEDURE — 96376 TX/PRO/DX INJ SAME DRUG ADON: CPT

## 2024-11-10 PROCEDURE — 85018 HEMOGLOBIN: CPT

## 2024-11-10 PROCEDURE — 6370000000 HC RX 637 (ALT 250 FOR IP): Performed by: NURSE PRACTITIONER

## 2024-11-10 PROCEDURE — 78278 ACUTE GI BLOOD LOSS IMAGING: CPT

## 2024-11-10 PROCEDURE — A9560 TC99M LABELED RBC: HCPCS | Performed by: RADIOLOGY

## 2024-11-10 PROCEDURE — G0378 HOSPITAL OBSERVATION PER HR: HCPCS

## 2024-11-10 PROCEDURE — 99232 SBSQ HOSP IP/OBS MODERATE 35: CPT | Performed by: STUDENT IN AN ORGANIZED HEALTH CARE EDUCATION/TRAINING PROGRAM

## 2024-11-10 PROCEDURE — 99232 SBSQ HOSP IP/OBS MODERATE 35: CPT | Performed by: INTERNAL MEDICINE

## 2024-11-10 PROCEDURE — 85014 HEMATOCRIT: CPT

## 2024-11-10 PROCEDURE — 3430000000 HC RX DIAGNOSTIC RADIOPHARMACEUTICAL: Performed by: RADIOLOGY

## 2024-11-10 PROCEDURE — 2580000003 HC RX 258: Performed by: NURSE PRACTITIONER

## 2024-11-10 RX ADMIN — SERTRALINE HYDROCHLORIDE 50 MG: 50 TABLET ORAL at 12:26

## 2024-11-10 RX ADMIN — ROPINIROLE HYDROCHLORIDE 2 MG: 2 TABLET, FILM COATED ORAL at 20:48

## 2024-11-10 RX ADMIN — BUSPIRONE HYDROCHLORIDE 5 MG: 5 TABLET ORAL at 12:26

## 2024-11-10 RX ADMIN — Medication 3 MG: at 20:48

## 2024-11-10 RX ADMIN — SODIUM CHLORIDE, PRESERVATIVE FREE 40 MG: 5 INJECTION INTRAVENOUS at 20:48

## 2024-11-10 RX ADMIN — Medication 20 MILLICURIE: at 09:15

## 2024-11-10 RX ADMIN — SODIUM CHLORIDE, PRESERVATIVE FREE 10 ML: 5 INJECTION INTRAVENOUS at 20:49

## 2024-11-10 RX ADMIN — CETIRIZINE HYDROCHLORIDE 5 MG: 10 TABLET, FILM COATED ORAL at 12:26

## 2024-11-10 RX ADMIN — DOCUSATE SODIUM 100 MG: 100 CAPSULE, LIQUID FILLED ORAL at 20:48

## 2024-11-10 RX ADMIN — SODIUM CHLORIDE, PRESERVATIVE FREE 40 MG: 5 INJECTION INTRAVENOUS at 10:41

## 2024-11-10 RX ADMIN — BUSPIRONE HYDROCHLORIDE 5 MG: 5 TABLET ORAL at 20:48

## 2024-11-10 RX ADMIN — ONDANSETRON 4 MG: 4 TABLET, ORALLY DISINTEGRATING ORAL at 23:50

## 2024-11-10 RX ADMIN — SODIUM CHLORIDE, PRESERVATIVE FREE 10 ML: 5 INJECTION INTRAVENOUS at 10:42

## 2024-11-10 NOTE — PROGRESS NOTES
INPATIENT CARDIOLOGY FOLLOW-UP    Name: Breanna Luz    Age: 89 y.o.    Date of Admission: 11/8/2024  8:52 PM    Date of Service: 11/10/2024    Chief Complaint: Follow-up for elevated troponin and demand ischemia    Interim History:  No new overnight cardiac complaints.  Patient appears quite confused this morning and she thinks she is at home.  Currently with no complaints of CP, SOB, palpitations, dizziness, or lightheadedness. SR on telemetry.    Review of Systems:   Cardiac: As per HPI  General: No fever, chills  Pulmonary: As per HPI  HEENT: No visual disturbances, difficult swallowing  GI: No nausea, vomiting  Endocrine: No thyroid disease or DM  Musculoskeletal: ROBIN x 4, no focal motor deficits  Skin: Intact, no rashes  Neuro/Psych: No headache or seizures    Problem List:  Patient Active Problem List   Diagnosis    Primary hypertension    Hyperlipidemia    Coronary artery disease involving native coronary artery of native heart without angina pectoris    Protein calorie malnutrition (HCC)    Primary osteoarthritis involving multiple joints    Spondylolisthesis of lumbar region    Anxiety disorder    Unsteady gait    History of falling, presenting hazards to health    Constipation    Senile dementia without behavioral disturbance (HCC)    Allergic rhinitis    Insomnia    Stage 3a chronic kidney disease (HCC)    Iron deficiency anemia secondary to inadequate dietary iron intake    Acute blood loss anemia    Gastrointestinal hemorrhage with melena    Elevated troponin       Allergies:  No Known Allergies    Current Medications:  Current Facility-Administered Medications   Medication Dose Route Frequency Provider Last Rate Last Admin    sodium chloride flush 0.9 % injection 5-40 mL  5-40 mL IntraVENous 2 times per day Alondra Caceres APRN - CNP   10 mL at 11/09/24 2121    sodium chloride flush 0.9 % injection 5-40 mL  5-40 mL IntraVENous PRN Alondra Caceres APRN - CNP        0.9 % sodium chloride  infusion   IntraVENous PRN Alondra Caceres APRN - CNP        ondansetron (ZOFRAN-ODT) disintegrating tablet 4 mg  4 mg Oral Q8H PRN Alondra Caceres APRN - CNP        Or    ondansetron (ZOFRAN) injection 4 mg  4 mg IntraVENous Q6H PRN Alondra Caceres APRN - CNP   4 mg at 11/09/24 2251    acetaminophen (TYLENOL) tablet 650 mg  650 mg Oral Q6H PRN Alondra Caceres APRN - CNP        Or    acetaminophen (TYLENOL) suppository 650 mg  650 mg Rectal Q6H PRN Alondra Caceres APRN - CNP        pantoprazole (PROTONIX) 40 mg in sodium chloride (PF) 0.9 % 10 mL injection  40 mg IntraVENous Q12H Alondra Caceres APRN - CNP   40 mg at 11/09/24 2109    [Held by provider] aspirin EC tablet 81 mg  81 mg Oral Daily Alondra Caceres APRN - CNP        busPIRone (BUSPAR) tablet 5 mg  5 mg Oral BID Alondra Caceres APRN - CNP   5 mg at 11/09/24 2109    calcium carbonate (TUMS) chewable tablet 500 mg  1 tablet Oral TID PRN Alondra Caceres APRN - CNP        Camphor-Menthol-Methyl Sal 3.1-6-10 % PTCH 1 patch (Patient Supplied)  1 patch Apply externally Daily PRN Alondra Caceres APRN - CNP        [Held by provider] dilTIAZem (CARDIZEM CD) extended release capsule 120 mg  120 mg Oral Daily Alondra Caceres APRN - CNP   120 mg at 11/09/24 0843    docusate sodium (COLACE) capsule 100 mg  100 mg Oral BID Alondra Caceres APRN - CNP        [Held by provider] lisinopril (PRINIVIL;ZESTRIL) tablet 10 mg  10 mg Oral BID Alondra Caceres APRN - CNP        cetirizine (ZYRTEC) tablet 5 mg  5 mg Oral Daily Alondra Caceres APRN - CNP        meclizine (ANTIVERT) tablet 25 mg  25 mg Oral 4x Daily PRN Alondra Caceres APRN - CNP        melatonin tablet 3 mg  3 mg Oral Nightly Alondra Caceres APRN - CNP   3 mg at 11/09/24 2109    rOPINIRole (REQUIP) tablet 2 mg  2 mg Oral Nightly Alondra Caceres, OTIS - CNP   2 mg at 11/09/24 2112    sertraline (ZOLOFT) tablet 50 mg  50 mg Oral Daily Alondra Caceres, APRN - CNP   50  To return to OF.

## 2024-11-10 NOTE — PROGRESS NOTES
PROGRESS NOTE    Patient Presents with/Seen in Consultation For      Reason for Consult: GIB     CHIEF COMPLAINT: Oxygen saturation in the 70s on room air    Subjective:     Patient off floor at this time for bleeding scan.  Chart reviewed.    Review of Systems  Aside from what was mentioned in the PMH and HPI, essentially unremarkable, all others negative.    Objective:     Patient Vitals for the past 8 hrs:   BP Pulse Resp SpO2   11/10/24 0830 119/61 89 15 96 %       Assessment deferred.  Patient off unit at this time    sodium chloride flush 0.9 % injection 5-40 mL, 2 times per day  sodium chloride flush 0.9 % injection 5-40 mL, PRN  0.9 % sodium chloride infusion, PRN  ondansetron (ZOFRAN-ODT) disintegrating tablet 4 mg, Q8H PRN   Or  ondansetron (ZOFRAN) injection 4 mg, Q6H PRN  acetaminophen (TYLENOL) tablet 650 mg, Q6H PRN   Or  acetaminophen (TYLENOL) suppository 650 mg, Q6H PRN  pantoprazole (PROTONIX) 40 mg in sodium chloride (PF) 0.9 % 10 mL injection, Q12H  [Held by provider] aspirin EC tablet 81 mg, Daily  busPIRone (BUSPAR) tablet 5 mg, BID  calcium carbonate (TUMS) chewable tablet 500 mg, TID PRN  Camphor-Menthol-Methyl Sal 3.1-6-10 % PTCH 1 patch (Patient Supplied), Daily PRN  docusate sodium (COLACE) capsule 100 mg, BID  [Held by provider] lisinopril (PRINIVIL;ZESTRIL) tablet 10 mg, BID  cetirizine (ZYRTEC) tablet 5 mg, Daily  meclizine (ANTIVERT) tablet 25 mg, 4x Daily PRN  melatonin tablet 3 mg, Nightly  rOPINIRole (REQUIP) tablet 2 mg, Nightly  sertraline (ZOLOFT) tablet 50 mg, Daily  perflutren lipid microspheres (DEFINITY) injection 1.5 mL, ONCE PRN  0.9 % sodium chloride infusion, PRN         Data Review  CBC:   Lab Results   Component Value Date/Time    WBC 8.5 11/08/2024 09:58 PM    RBC 2.26 11/08/2024 09:58 PM    HGB 7.5 11/10/2024 04:37 AM    HCT 23.8 11/10/2024 04:37 AM    MCV 92.9 11/08/2024 09:58 PM    MCH 30.1 11/08/2024 09:58 PM    MCHC 32.4 11/08/2024 09:58 PM    RDW 17.8 11/08/2024  09:58 PM    PLT 79 11/08/2024 09:58 PM    MPV 12.9 11/08/2024 09:58 PM     CMP:    Lab Results   Component Value Date/Time     11/10/2024 04:37 AM    K 4.4 11/10/2024 04:37 AM    CL 99 11/10/2024 04:37 AM    CO2 19 11/10/2024 04:37 AM    BUN 33 11/10/2024 04:37 AM    CREATININE 1.3 11/10/2024 04:37 AM    GFRAA >60 08/26/2022 07:55 AM    LABGLOM 37 11/10/2024 04:37 AM    LABGLOM 36 04/30/2024 05:19 AM    GLUCOSE 134 11/10/2024 04:37 AM    CALCIUM 8.5 11/10/2024 04:37 AM    BILITOT 0.6 11/10/2024 04:37 AM    ALKPHOS 65 11/10/2024 04:37 AM    AST 43 11/10/2024 04:37 AM    ALT 47 11/10/2024 04:37 AM     Hepatic Function Panel:    Lab Results   Component Value Date/Time    ALKPHOS 65 11/10/2024 04:37 AM    ALT 47 11/10/2024 04:37 AM    AST 43 11/10/2024 04:37 AM    BILITOT 0.6 11/10/2024 04:37 AM     No components found for: \"CHLPL\"    Lab Results   Component Value Date    TRIG 118 04/23/2024    TRIG 137 09/29/2021       Lab Results   Component Value Date    HDL 57 04/23/2024    HDL 57 09/29/2021     No components found for: \"LDLCALC\"  No components found for: \"LABVLDL\"   PT/INR:    Lab Results   Component Value Date/Time    PROTIME 14.7 11/09/2024 05:50 AM    INR 1.4 11/09/2024 05:50 AM     IRON:    Lab Results   Component Value Date/Time    IRON 235 11/09/2024 05:50 AM     Iron Saturation:  No components found for: \"PERCENTFE\"  FERRITIN:    Lab Results   Component Value Date/Time    FERRITIN 543 11/09/2024 05:15 PM         Assessment:     Principal Problem:  Melena, occult +  Electrolyte abnormalities, defer  Anemia, normocytic  Transaminitis  Elevated troponin-defer  Stage III CKD    Plan:     Monitor stools and document  Supportive care  Bleeding scan today  OK for clears, after bleeding scan  Serial H&H, transfuse less than 7 per primary  Supportive care  Echo per cardiology services  Endoscopy pending clinical course and cardiology evaluations  Trend labs  Will follow    Discussed with Dr. Mae  Plan per

## 2024-11-10 NOTE — PLAN OF CARE
Problem: Discharge Planning  Goal: Discharge to home or other facility with appropriate resources  11/9/2024 2205 by Saji Pinto RN  Outcome: Progressing  11/9/2024 1555 by Amy Chacko RN  Outcome: Progressing     Problem: Pain  Goal: Verbalizes/displays adequate comfort level or baseline comfort level  11/9/2024 2205 by Saji Pinto RN  Outcome: Progressing  11/9/2024 1555 by Amy Chacko RN  Outcome: Progressing     Problem: Safety - Adult  Goal: Free from fall injury  11/9/2024 2205 by Saji Pinto RN  Outcome: Progressing  11/9/2024 1555 by Amy Chacko RN  Outcome: Progressing     Problem: Confusion  Goal: Confusion, delirium, dementia, or psychosis is improved or at baseline  Description: INTERVENTIONS:  1. Assess for possible contributors to thought disturbance, including medications, impaired vision or hearing, underlying metabolic abnormalities, dehydration, psychiatric diagnoses, and notify attending LIP  2. [place] high risk fall precautions, as indicated  3. Provide frequent short contacts to provide reality reorientation, refocusing and direction  4. Decrease environmental stimuli, including noise as appropriate  5. Monitor and intervene to maintain adequate nutrition, hydration, elimination, sleep and activity  6. If unable to ensure safety without constant attention obtain sitter and review sitter guidelines with assigned personnel  7. Initiate Psychosocial CNS and Spiritual Care consult, as indicated  11/9/2024 2205 by Saji Pinto RN  Outcome: Progressing  11/9/2024 1555 by Amy Chacko RN  Outcome: Progressing     Problem: ABCDS Injury Assessment  Goal: Absence of physical injury  11/9/2024 2205 by Saji Pinto RN  Outcome: Progressing  11/9/2024 1555 by Amy Chacko RN  Outcome: Progressing     Problem: Skin/Tissue Integrity  Goal: Absence of new skin breakdown  Description: 1.  Monitor for areas of redness and/or skin breakdown  2.  Assess vascular access sites

## 2024-11-10 NOTE — PROGRESS NOTES
[facility] Hospitalist Progress Note    Admitting Date and Time: 11/8/2024  8:52 PM  Admit Dx: Dehydration [E86.0]  Acute blood loss anemia [D62]  Demand ischemia (HCC) [I24.89]  Elevated troponin [R79.89]  Bilateral pleural effusion [J90]  HELEN (acute kidney injury) (HCC) [N17.9]  Anemia requiring transfusions [D64.9]  Gastrointestinal hemorrhage, unspecified gastrointestinal hemorrhage type [K92.2]    Subjective:  Patient is being followed for Dehydration [E86.0]  Acute blood loss anemia [D62]  Demand ischemia (HCC) [I24.89]  Elevated troponin [R79.89]  Bilateral pleural effusion [J90]  HELEN (acute kidney injury) (HCC) [N17.9]  Anemia requiring transfusions [D64.9]  Gastrointestinal hemorrhage, unspecified gastrointestinal hemorrhage type [K92.2]   Pt feels okay  Per RN: no additional concerns    ROS: denies fever, chills, cp, sob, n/v, HA unless otherwise noted above     sodium chloride flush  5-40 mL IntraVENous 2 times per day    pantoprazole (PROTONIX) 40 mg in sodium chloride (PF) 0.9 % 10 mL injection  40 mg IntraVENous Q12H    [Held by provider] aspirin  81 mg Oral Daily    busPIRone  5 mg Oral BID    docusate sodium  100 mg Oral BID    [Held by provider] lisinopril  10 mg Oral BID    cetirizine  5 mg Oral Daily    melatonin  3 mg Oral Nightly    rOPINIRole  2 mg Oral Nightly    sertraline  50 mg Oral Daily     sodium chloride flush, 5-40 mL, PRN  sodium chloride, , PRN  ondansetron, 4 mg, Q8H PRN   Or  ondansetron, 4 mg, Q6H PRN  acetaminophen, 650 mg, Q6H PRN   Or  acetaminophen, 650 mg, Q6H PRN  calcium carbonate, 1 tablet, TID PRN  Camphor-Menthol-Methyl Sal, 1 patch, Daily PRN  meclizine, 25 mg, 4x Daily PRN  perflutren lipid microspheres, 1.5 mL, ONCE PRN  sodium chloride, , PRN         Objective:  BP (!) 128/56   Pulse (!) 103   Temp 97.5 °F (36.4 °C) (Infrared)   Resp 16   Ht 1.575 m (5' 2\")   Wt 59 kg (130 lb)   LMP  (LMP Unknown)   SpO2 94%   BMI 23.78 kg/m²     General Appearance:

## 2024-11-11 ENCOUNTER — APPOINTMENT (OUTPATIENT)
Age: 89
DRG: 378 | End: 2024-11-11
Payer: COMMERCIAL

## 2024-11-11 LAB
ALBUMIN SERPL-MCNC: 4 G/DL (ref 3.5–5.2)
ALP SERPL-CCNC: 73 U/L (ref 35–104)
ALT SERPL-CCNC: 44 U/L (ref 0–32)
ANION GAP SERPL CALCULATED.3IONS-SCNC: 14 MMOL/L (ref 7–16)
AST SERPL-CCNC: 38 U/L (ref 0–31)
BASOPHILS # BLD: 0 K/UL (ref 0–0.2)
BASOPHILS NFR BLD: 0 % (ref 0–2)
BILIRUB SERPL-MCNC: 0.9 MG/DL (ref 0–1.2)
BUN SERPL-MCNC: 35 MG/DL (ref 6–23)
CALCIUM SERPL-MCNC: 8.8 MG/DL (ref 8.6–10.2)
CHLORIDE SERPL-SCNC: 97 MMOL/L (ref 98–107)
CO2 SERPL-SCNC: 18 MMOL/L (ref 22–29)
CREAT SERPL-MCNC: 1.3 MG/DL (ref 0.5–1)
ECHO AO ASC DIAM: 3.1 CM
ECHO AO ASCENDING AORTA INDEX: 1.95 CM/M2
ECHO AV AREA PEAK VELOCITY: 1.5 CM2
ECHO AV AREA VTI: 1.6 CM2
ECHO AV AREA/BSA PEAK VELOCITY: 0.9 CM2/M2
ECHO AV AREA/BSA VTI: 1 CM2/M2
ECHO AV CUSP MM: 1.9 CM
ECHO AV MEAN GRADIENT: 2 MMHG
ECHO AV MEAN VELOCITY: 0.6 M/S
ECHO AV PEAK GRADIENT: 6 MMHG
ECHO AV PEAK VELOCITY: 1.2 M/S
ECHO AV VELOCITY RATIO: 0.5
ECHO AV VTI: 14.5 CM
ECHO BSA: 1.61 M2
ECHO EST RA PRESSURE: 15 MMHG
ECHO LA DIAMETER INDEX: 2.52 CM/M2
ECHO LA DIAMETER: 4 CM
ECHO LA VOL A-L A2C: 98 ML (ref 22–52)
ECHO LA VOL A-L A4C: 68 ML (ref 22–52)
ECHO LA VOL MOD A2C: 93 ML (ref 22–52)
ECHO LA VOL MOD A4C: 65 ML (ref 22–52)
ECHO LA VOLUME AREA LENGTH: 82 ML
ECHO LA VOLUME INDEX A-L A2C: 62 ML/M2 (ref 16–34)
ECHO LA VOLUME INDEX A-L A4C: 43 ML/M2 (ref 16–34)
ECHO LA VOLUME INDEX AREA LENGTH: 52 ML/M2 (ref 16–34)
ECHO LA VOLUME INDEX MOD A2C: 58 ML/M2 (ref 16–34)
ECHO LA VOLUME INDEX MOD A4C: 41 ML/M2 (ref 16–34)
ECHO LV EDV A2C: 109 ML
ECHO LV EDV A4C: 100 ML
ECHO LV EDV BP: 110 ML (ref 56–104)
ECHO LV EDV INDEX A4C: 63 ML/M2
ECHO LV EDV INDEX BP: 69 ML/M2
ECHO LV EDV NDEX A2C: 69 ML/M2
ECHO LV EF PHYSICIAN: 40 %
ECHO LV EJECTION FRACTION A2C: 31 %
ECHO LV EJECTION FRACTION A4C: 44 %
ECHO LV EJECTION FRACTION BIPLANE: 37 % (ref 55–100)
ECHO LV ESV A2C: 76 ML
ECHO LV ESV A4C: 56 ML
ECHO LV ESV BP: 69 ML (ref 19–49)
ECHO LV ESV INDEX A2C: 48 ML/M2
ECHO LV ESV INDEX A4C: 35 ML/M2
ECHO LV ESV INDEX BP: 43 ML/M2
ECHO LV FRACTIONAL SHORTENING: 16 % (ref 28–44)
ECHO LV INTERNAL DIMENSION DIASTOLE INDEX: 2.83 CM/M2
ECHO LV INTERNAL DIMENSION DIASTOLIC: 4.5 CM (ref 3.9–5.3)
ECHO LV INTERNAL DIMENSION SYSTOLIC INDEX: 2.39 CM/M2
ECHO LV INTERNAL DIMENSION SYSTOLIC: 3.8 CM
ECHO LV IVSD: 0.9 CM (ref 0.6–0.9)
ECHO LV IVSS: 0.9 CM
ECHO LV MASS 2D: 113.6 G (ref 67–162)
ECHO LV MASS INDEX 2D: 71.5 G/M2 (ref 43–95)
ECHO LV POSTERIOR WALL DIASTOLIC: 0.7 CM (ref 0.6–0.9)
ECHO LV POSTERIOR WALL SYSTOLIC: 1.3 CM
ECHO LV RELATIVE WALL THICKNESS RATIO: 0.31
ECHO LVOT AREA: 3.1 CM2
ECHO LVOT AV VTI INDEX: 0.54
ECHO LVOT DIAM: 2 CM
ECHO LVOT MEAN GRADIENT: 1 MMHG
ECHO LVOT PEAK GRADIENT: 1 MMHG
ECHO LVOT PEAK VELOCITY: 0.6 M/S
ECHO LVOT STROKE VOLUME INDEX: 15.4 ML/M2
ECHO LVOT SV: 24.5 ML
ECHO LVOT VTI: 7.8 CM
ECHO MV "A" WAVE DURATION: 80.7 MSEC
ECHO MV A VELOCITY: 0.95 M/S
ECHO MV AREA PHT: 6.5 CM2
ECHO MV AREA VTI: 1 CM2
ECHO MV E DECELERATION TIME (DT): 157.9 MS
ECHO MV E VELOCITY: 1.45 M/S
ECHO MV E/A RATIO: 1.53
ECHO MV EROA PISA: 0.1 CM2
ECHO MV LVOT VTI INDEX: 3.14
ECHO MV MAX VELOCITY: 1.5 M/S
ECHO MV MEAN GRADIENT: 4 MMHG
ECHO MV MEAN VELOCITY: 0.9 M/S
ECHO MV PEAK GRADIENT: 9 MMHG
ECHO MV PRESSURE HALF TIME (PHT): 33.8 MS
ECHO MV REGURGITANT ALIASING (NYQUIST) VELOCITY: 34 CM/S
ECHO MV REGURGITANT RADIUS PISA: 0.53 CM
ECHO MV REGURGITANT VELOCITY PISA: 6 M/S
ECHO MV REGURGITANT VOLUME PISA: 16.14 ML
ECHO MV REGURGITANT VTIA: 161.5 CM
ECHO MV VTI: 24.5 CM
ECHO PULMONARY ARTERY END DIASTOLIC PRESSURE: 16 MMHG
ECHO PULMONARY ARTERY SYSTOLIC PRESSURE (PASP): 62 MMHG
ECHO PV MAX VELOCITY: 0.8 M/S
ECHO PV MEAN GRADIENT: 1 MMHG
ECHO PV MEAN VELOCITY: 0.4 M/S
ECHO PV PEAK GRADIENT: 2 MMHG
ECHO PV REGURGITANT MAX VELOCITY: 2 M/S
ECHO PV VTI: 8.6 CM
ECHO RIGHT VENTRICULAR SYSTOLIC PRESSURE (RVSP): 62 MMHG
ECHO TV REGURGITANT MAX VELOCITY: 3.42 M/S
ECHO TV REGURGITANT PEAK GRADIENT: 47 MMHG
EKG ATRIAL RATE: 105 BPM
EKG ATRIAL RATE: 97 BPM
EKG P AXIS: 54 DEGREES
EKG P AXIS: 62 DEGREES
EKG P-R INTERVAL: 162 MS
EKG P-R INTERVAL: 162 MS
EKG Q-T INTERVAL: 342 MS
EKG Q-T INTERVAL: 346 MS
EKG QRS DURATION: 86 MS
EKG QRS DURATION: 86 MS
EKG QTC CALCULATION (BAZETT): 439 MS
EKG QTC CALCULATION (BAZETT): 452 MS
EKG R AXIS: 48 DEGREES
EKG R AXIS: 9 DEGREES
EKG T AXIS: -136 DEGREES
EKG T AXIS: 178 DEGREES
EKG VENTRICULAR RATE: 105 BPM
EKG VENTRICULAR RATE: 97 BPM
EOSINOPHIL # BLD: 0 K/UL (ref 0.05–0.5)
EOSINOPHILS RELATIVE PERCENT: 0 % (ref 0–6)
ERYTHROCYTE [DISTWIDTH] IN BLOOD BY AUTOMATED COUNT: 18.4 % (ref 11.5–15)
GFR, ESTIMATED: 39 ML/MIN/1.73M2
GLUCOSE SERPL-MCNC: 143 MG/DL (ref 74–99)
HCT VFR BLD AUTO: 27.3 % (ref 34–48)
HGB BLD-MCNC: 8.7 G/DL (ref 11.5–15.5)
LYMPHOCYTES NFR BLD: 1.79 K/UL (ref 1.5–4)
LYMPHOCYTES RELATIVE PERCENT: 17 % (ref 20–42)
MCH RBC QN AUTO: 30 PG (ref 26–35)
MCHC RBC AUTO-ENTMCNC: 31.9 G/DL (ref 32–34.5)
MCV RBC AUTO: 94.1 FL (ref 80–99.9)
METAMYELOCYTES ABSOLUTE COUNT: 0.11 K/UL (ref 0–0.12)
METAMYELOCYTES: 1 % (ref 0–1)
MONOCYTES NFR BLD: 0.74 K/UL (ref 0.1–0.95)
MONOCYTES NFR BLD: 7 % (ref 2–12)
MYELOCYTES ABSOLUTE COUNT: 0.21 K/UL
MYELOCYTES: 2 %
NEUTROPHILS NFR BLD: 73 % (ref 43–80)
NEUTS SEG NFR BLD: 7.67 K/UL (ref 1.8–7.3)
NUCLEATED RED BLOOD CELLS: 16 PER 100 WBC
PLATELET CONFIRMATION: NORMAL
PLATELET, FLUORESCENCE: 66 K/UL (ref 130–450)
PMV BLD AUTO: ABNORMAL FL (ref 7–12)
POTASSIUM SERPL-SCNC: 4.5 MMOL/L (ref 3.5–5)
PROT SERPL-MCNC: 6.4 G/DL (ref 6.4–8.3)
RBC # BLD AUTO: 2.9 M/UL (ref 3.5–5.5)
RBC # BLD: ABNORMAL 10*6/UL
SODIUM SERPL-SCNC: 129 MMOL/L (ref 132–146)
WBC OTHER # BLD: 10.5 K/UL (ref 4.5–11.5)

## 2024-11-11 PROCEDURE — 99232 SBSQ HOSP IP/OBS MODERATE 35: CPT | Performed by: STUDENT IN AN ORGANIZED HEALTH CARE EDUCATION/TRAINING PROGRAM

## 2024-11-11 PROCEDURE — 6370000000 HC RX 637 (ALT 250 FOR IP): Performed by: NURSE PRACTITIONER

## 2024-11-11 PROCEDURE — 51701 INSERT BLADDER CATHETER: CPT

## 2024-11-11 PROCEDURE — G0378 HOSPITAL OBSERVATION PER HR: HCPCS

## 2024-11-11 PROCEDURE — 93306 TTE W/DOPPLER COMPLETE: CPT | Performed by: INTERNAL MEDICINE

## 2024-11-11 PROCEDURE — 93010 ELECTROCARDIOGRAM REPORT: CPT | Performed by: INTERNAL MEDICINE

## 2024-11-11 PROCEDURE — 93306 TTE W/DOPPLER COMPLETE: CPT

## 2024-11-11 PROCEDURE — 6360000002 HC RX W HCPCS: Performed by: STUDENT IN AN ORGANIZED HEALTH CARE EDUCATION/TRAINING PROGRAM

## 2024-11-11 PROCEDURE — 85025 COMPLETE CBC W/AUTO DIFF WBC: CPT

## 2024-11-11 PROCEDURE — 36415 COLL VENOUS BLD VENIPUNCTURE: CPT

## 2024-11-11 PROCEDURE — 51798 US URINE CAPACITY MEASURE: CPT

## 2024-11-11 PROCEDURE — 6360000002 HC RX W HCPCS: Performed by: NURSE PRACTITIONER

## 2024-11-11 PROCEDURE — 96376 TX/PRO/DX INJ SAME DRUG ADON: CPT

## 2024-11-11 PROCEDURE — 2060000000 HC ICU INTERMEDIATE R&B

## 2024-11-11 PROCEDURE — 6370000000 HC RX 637 (ALT 250 FOR IP): Performed by: STUDENT IN AN ORGANIZED HEALTH CARE EDUCATION/TRAINING PROGRAM

## 2024-11-11 PROCEDURE — 2580000003 HC RX 258: Performed by: NURSE PRACTITIONER

## 2024-11-11 PROCEDURE — 80053 COMPREHEN METABOLIC PANEL: CPT

## 2024-11-11 PROCEDURE — 99233 SBSQ HOSP IP/OBS HIGH 50: CPT | Performed by: INTERNAL MEDICINE

## 2024-11-11 RX ORDER — PROCHLORPERAZINE EDISYLATE 5 MG/ML
5 INJECTION INTRAMUSCULAR; INTRAVENOUS ONCE
Status: COMPLETED | OUTPATIENT
Start: 2024-11-11 | End: 2024-11-11

## 2024-11-11 RX ORDER — METOPROLOL SUCCINATE 25 MG/1
12.5 TABLET, EXTENDED RELEASE ORAL DAILY
Qty: 30 TABLET | Refills: 3 | Status: SHIPPED | OUTPATIENT
Start: 2024-11-11

## 2024-11-11 RX ORDER — POLYETHYLENE GLYCOL 3350 17 G/17G
17 POWDER, FOR SOLUTION ORAL DAILY
Status: DISCONTINUED | OUTPATIENT
Start: 2024-11-11 | End: 2024-11-16 | Stop reason: HOSPADM

## 2024-11-11 RX ORDER — LORAZEPAM 0.5 MG/1
0.5 TABLET ORAL DAILY
Status: DISCONTINUED | OUTPATIENT
Start: 2024-11-11 | End: 2024-11-16 | Stop reason: HOSPADM

## 2024-11-11 RX ADMIN — BUSPIRONE HYDROCHLORIDE 5 MG: 5 TABLET ORAL at 20:09

## 2024-11-11 RX ADMIN — BUSPIRONE HYDROCHLORIDE 5 MG: 5 TABLET ORAL at 09:07

## 2024-11-11 RX ADMIN — PROCHLORPERAZINE EDISYLATE 5 MG: 5 INJECTION INTRAMUSCULAR; INTRAVENOUS at 16:07

## 2024-11-11 RX ADMIN — SODIUM CHLORIDE, PRESERVATIVE FREE 40 MG: 5 INJECTION INTRAVENOUS at 20:09

## 2024-11-11 RX ADMIN — ONDANSETRON 4 MG: 2 INJECTION INTRAMUSCULAR; INTRAVENOUS at 07:52

## 2024-11-11 RX ADMIN — MECLIZINE 25 MG: 12.5 TABLET ORAL at 12:27

## 2024-11-11 RX ADMIN — DOCUSATE SODIUM 100 MG: 100 CAPSULE, LIQUID FILLED ORAL at 20:08

## 2024-11-11 RX ADMIN — SODIUM CHLORIDE, PRESERVATIVE FREE 10 ML: 5 INJECTION INTRAVENOUS at 07:52

## 2024-11-11 RX ADMIN — DOCUSATE SODIUM 100 MG: 100 CAPSULE, LIQUID FILLED ORAL at 09:07

## 2024-11-11 RX ADMIN — POLYETHYLENE GLYCOL 3350 17 G: 17 POWDER, FOR SOLUTION ORAL at 12:26

## 2024-11-11 RX ADMIN — SERTRALINE HYDROCHLORIDE 50 MG: 50 TABLET ORAL at 09:07

## 2024-11-11 RX ADMIN — MECLIZINE 25 MG: 12.5 TABLET ORAL at 06:15

## 2024-11-11 RX ADMIN — SODIUM CHLORIDE, PRESERVATIVE FREE 40 MG: 5 INJECTION INTRAVENOUS at 09:07

## 2024-11-11 RX ADMIN — LORAZEPAM 0.5 MG: 0.5 TABLET ORAL at 12:27

## 2024-11-11 RX ADMIN — CALCIUM CARBONATE 500 MG: 500 TABLET, CHEWABLE ORAL at 06:15

## 2024-11-11 RX ADMIN — CETIRIZINE HYDROCHLORIDE 5 MG: 10 TABLET, FILM COATED ORAL at 09:07

## 2024-11-11 RX ADMIN — ACETAMINOPHEN 650 MG: 325 TABLET ORAL at 20:08

## 2024-11-11 RX ADMIN — Medication 3 MG: at 20:09

## 2024-11-11 RX ADMIN — SODIUM CHLORIDE, PRESERVATIVE FREE 10 ML: 5 INJECTION INTRAVENOUS at 20:09

## 2024-11-11 RX ADMIN — ACETAMINOPHEN 650 MG: 325 TABLET ORAL at 05:42

## 2024-11-11 RX ADMIN — ROPINIROLE HYDROCHLORIDE 2 MG: 2 TABLET, FILM COATED ORAL at 20:09

## 2024-11-11 ASSESSMENT — PAIN DESCRIPTION - LOCATION
LOCATION: ABDOMEN
LOCATION: HEAD

## 2024-11-11 ASSESSMENT — PAIN SCALES - GENERAL
PAINLEVEL_OUTOF10: 2
PAINLEVEL_OUTOF10: 2
PAINLEVEL_OUTOF10: 3

## 2024-11-11 ASSESSMENT — PAIN DESCRIPTION - DESCRIPTORS
DESCRIPTORS: ACHING
DESCRIPTORS: ACHING

## 2024-11-11 NOTE — PROGRESS NOTES
Messaged cardio per Dr. Rowley's request checking to see if okay from cardiology perspective to restart home dose of ativan. Dr. Washington will be up to see the patient.  Teresa Servin RN

## 2024-11-11 NOTE — PLAN OF CARE
Problem: Nutrition Deficit:  Goal: Optimize nutritional status  Outcome: Not Progressing     Problem: Discharge Planning  Goal: Discharge to home or other facility with appropriate resources  Outcome: Progressing     Problem: Pain  Goal: Verbalizes/displays adequate comfort level or baseline comfort level  Outcome: Progressing     Problem: Safety - Adult  Goal: Free from fall injury  Outcome: Progressing     Problem: Confusion  Goal: Confusion, delirium, dementia, or psychosis is improved or at baseline  Description: INTERVENTIONS:  1. Assess for possible contributors to thought disturbance, including medications, impaired vision or hearing, underlying metabolic abnormalities, dehydration, psychiatric diagnoses, and notify attending LIP  2. [place] high risk fall precautions, as indicated  3. Provide frequent short contacts to provide reality reorientation, refocusing and direction  4. Decrease environmental stimuli, including noise as appropriate  5. Monitor and intervene to maintain adequate nutrition, hydration, elimination, sleep and activity  6. If unable to ensure safety without constant attention obtain sitter and review sitter guidelines with assigned personnel  7. Initiate Psychosocial CNS and Spiritual Care consult, as indicated  Outcome: Progressing     Problem: ABCDS Injury Assessment  Goal: Absence of physical injury  Outcome: Progressing     Problem: Skin/Tissue Integrity  Goal: Absence of new skin breakdown  Description: 1.  Monitor for areas of redness and/or skin breakdown  2.  Assess vascular access sites hourly  3.  Every 4-6 hours minimum:  Change oxygen saturation probe site  4.  Every 4-6 hours:  If on nasal continuous positive airway pressure, respiratory therapy assess nares and determine need for appliance change or resting period.  Outcome: Progressing     Problem: Nutrition Deficit:  Goal: Optimize nutritional status  Outcome: Not Progressing

## 2024-11-11 NOTE — CARE COORDINATION
Social Work:    Follow-up call was made to Fozia at [facility] who requested PT to determine whether or not Mrs. Luz will need snf or can return to [facility]. Social service made a follow-up visit with Mrs. Luz but she was sleeping. A call was placed to Mrs. Luz son/POA Celestino & social service advised him about her role, as well as discussed possible snf need vs return to [facility] depending on PT. Celestino advises that Mrs. Luz resides in [facility] and used a wheeled walker prior to admission. He explained that Mrs. Luz is use to having ativan twice a day and feels she is withdrawing & miserable without it.  Celestino did talk to the unit about his concerns.  Floyd prefers return to [facility] with therapy but if snf is required, he prefers [facility] snf. Social work prompted for PT evaluation in a.m.to help with planning.    Electronically signed by SIENNA Cloud on 11/11/2024 at 3:54 PM

## 2024-11-11 NOTE — PROGRESS NOTES
Occupational Therapy  Date:11/11/2024  Patient Name: Breanna Luz  Room: 0419/0419-A     Occupational Therapy (OT) order received, patient's medical record reviewed, and OT evaluation attempted this afternoon; patient asleep upon this OTR's arrival to patient's room. Patient declined to attempt EOB/OOB activities, noting her preference to rest, despite provision of encouragement. OT evaluation to be re-attempted at later time/date, as able/appropriate.    Beti Flanagan, OTR/L  License Number: [id number]

## 2024-11-11 NOTE — PROGRESS NOTES
[facility] Hospitalist Progress Note    Admitting Date and Time: 11/8/2024  8:52 PM  Admit Dx: Dehydration [E86.0]  Acute blood loss anemia [D62]  Demand ischemia (HCC) [I24.89]  Elevated troponin [R79.89]  Bilateral pleural effusion [J90]  HELEN (acute kidney injury) (HCC) [N17.9]  Anemia requiring transfusions [D64.9]  Gastrointestinal hemorrhage, unspecified gastrointestinal hemorrhage type [K92.2]    Subjective:  Patient is being followed for Dehydration [E86.0]  Acute blood loss anemia [D62]  Demand ischemia (HCC) [I24.89]  Elevated troponin [R79.89]  Bilateral pleural effusion [J90]  HELEN (acute kidney injury) (HCC) [N17.9]  Anemia requiring transfusions [D64.9]  Gastrointestinal hemorrhage, unspecified gastrointestinal hemorrhage type [K92.2]   Pt feels okay  Per RN: no additional concerns    ROS: denies fever, chills, cp, sob, n/v, HA unless otherwise noted above     sodium chloride flush  5-40 mL IntraVENous 2 times per day    pantoprazole (PROTONIX) 40 mg in sodium chloride (PF) 0.9 % 10 mL injection  40 mg IntraVENous Q12H    [Held by provider] aspirin  81 mg Oral Daily    busPIRone  5 mg Oral BID    docusate sodium  100 mg Oral BID    [Held by provider] lisinopril  10 mg Oral BID    cetirizine  5 mg Oral Daily    melatonin  3 mg Oral Nightly    rOPINIRole  2 mg Oral Nightly    sertraline  50 mg Oral Daily     sodium chloride flush, 5-40 mL, PRN  sodium chloride, , PRN  ondansetron, 4 mg, Q8H PRN   Or  ondansetron, 4 mg, Q6H PRN  acetaminophen, 650 mg, Q6H PRN   Or  acetaminophen, 650 mg, Q6H PRN  calcium carbonate, 1 tablet, TID PRN  Camphor-Menthol-Methyl Sal, 1 patch, Daily PRN  meclizine, 25 mg, 4x Daily PRN  perflutren lipid microspheres, 1.5 mL, ONCE PRN  sodium chloride, , PRN         Objective:  BP (!) 116/57   Pulse (!) 104   Temp 98.6 °F (37 °C) (Axillary)   Resp 24   Ht 1.575 m (5' 2\")   Wt 59 kg (130 lb)   LMP  (LMP Unknown)   SpO2 92%   BMI 23.78 kg/m²     General Appearance:

## 2024-11-11 NOTE — PROGRESS NOTES
INPATIENT CARDIOLOGY FOLLOW-UP    Name: Breanna Luz    Age: 89 y.o.    Date of Admission: 11/8/2024  8:52 PM    Date of Service: 11/11/2024    Chief Complaint: Follow-up for elevated troponin and demand ischemia    Interim History:  No new overnight cardiac complaints.  Patient appears quite confused this morning and she thinks she is at home.  Currently with no complaints of CP, SOB, palpitations, dizziness, or lightheadedness. SR on telemetry.    Was having some symptoms of benzo withdrawal.  Reinitiated on Xanax.  Doing better.  Had echocardiogram today    Review of Systems:   Cardiac: As per HPI  General: No fever, chills  Pulmonary: As per HPI  HEENT: No visual disturbances, difficult swallowing  GI: No nausea, vomiting  Endocrine: No thyroid disease or DM  Musculoskeletal: ROBIN x 4, no focal motor deficits  Skin: Intact, no rashes  Neuro/Psych: No headache or seizures    Problem List:  Patient Active Problem List   Diagnosis    Primary hypertension    Hyperlipidemia    Coronary artery disease involving native coronary artery of native heart without angina pectoris    Protein calorie malnutrition (HCC)    Primary osteoarthritis involving multiple joints    Spondylolisthesis of lumbar region    Anxiety disorder    Unsteady gait    History of falling, presenting hazards to health    Constipation    Senile dementia without behavioral disturbance (HCC)    Allergic rhinitis    Insomnia    Stage 3a chronic kidney disease (HCC)    Iron deficiency anemia secondary to inadequate dietary iron intake    Acute blood loss anemia    Gastrointestinal hemorrhage with melena    Elevated troponin       Allergies:  No Known Allergies    Current Medications:  Current Facility-Administered Medications   Medication Dose Route Frequency Provider Last Rate Last Admin    sodium chloride flush 0.9 % injection 5-40 mL  5-40 mL IntraVENous 2 times per day Alondra Caceres, OTIS - CNP   10 mL at 11/11/24 0752    sodium chloride

## 2024-11-11 NOTE — PROGRESS NOTES
PROGRESS NOTE        Patient Presents with/Seen in Consultation For      Reason for Consult: GIB     CHIEF COMPLAINT: Oxygen saturation in the 70s on room air    Subjective:     Patient seen laying in bed anxious stating she cannot breathe.  Per son at bedside patient normally gets daily Ativan while at facility.  No complaints of abdominal pain or nausea.  Will advance diet.  No overt signs of bleeding reported.  Discussed plan of care with son at bedside who states he does not want any procedures done at this time.    Review of Systems  Aside from what was mentioned in the PMH and HPI, essentially unremarkable, all others negative.    Objective:     BP (!) 140/62   Pulse (!) 114   Temp 98 °F (36.7 °C) (Axillary)   Resp 24   Ht 1.575 m (5' 2\")   Wt 59 kg (130 lb)   LMP  (LMP Unknown)   SpO2 92%   BMI 23.78 kg/m²     General appearance: alert, awake, anxious, laying in bed, and cooperative  Eyes: conjunctiva pale, sclera anicteric. PERRL.  Lungs: Expiratory wheeze to auscultation bilaterally  Heart: regular rate and rhythm, no murmur, 2+ pulses; trace edema  Abdomen: soft, non-tender; bowel sounds normal; no masses,  no organomegaly  Extremities: extremities trace edema  Pulses: 2+ and symmetric  Skin: Skin color, texture, turgor normal.   Neurologic: Grossly normal    polyethylene glycol (GLYCOLAX) packet 17 g, Daily  sodium chloride flush 0.9 % injection 5-40 mL, 2 times per day  sodium chloride flush 0.9 % injection 5-40 mL, PRN  0.9 % sodium chloride infusion, PRN  ondansetron (ZOFRAN-ODT) disintegrating tablet 4 mg, Q8H PRN   Or  ondansetron (ZOFRAN) injection 4 mg, Q6H PRN  acetaminophen (TYLENOL) tablet 650 mg, Q6H PRN   Or  acetaminophen (TYLENOL) suppository 650 mg, Q6H PRN  pantoprazole (PROTONIX) 40 mg in sodium chloride (PF) 0.9 % 10 mL injection, Q12H  aspirin EC tablet 81 mg, Daily  busPIRone (BUSPAR) tablet 5 mg, BID  calcium carbonate (TUMS) chewable tablet 500 mg, TID

## 2024-11-11 NOTE — PROGRESS NOTES
Spiritual Health History and Assessment/Progress Note  [facility]     Encounter, Attempted Encounter (Patient was unavailable for a visit),  ,  ,      Name: Breanna Luz MRN: [id number]    Age: 89 y.o.     Sex: female   Language: English   Church: [employer]   Acute blood loss anemia     Date: 11/11/2024                           Spiritual Assessment began in [address] INTERNAL MEDICINE 2        Referral/Consult From: Rounding   Encounter Overview/Reason:  Encounter, Attempted Encounter (Patient was unavailable for a visit)  Service Provided For: Patient    Sharon, Belief, Meaning:   Patient Other: Patient was unavailable for a visit.  Family/Friends Other: Unable to assess at this time      Importance and Influence:  Patient unable to assess at this time  Family/Friends Other: Unable to assess at this time    Community:  Patient Other: Unable to assess at this time  Family/Friends Other: Unable to assess at this time    Assessment and Plan of Care:     Patient Interventions include: Other: None  Family/Friends Interventions include: Other: None    Patient Plan of Care: Spiritual Care available upon further referral  Family/Friends Plan of Care: Spiritual Care available upon further referral    Electronically signed by Chaplain Sammy on 11/11/2024 at 4:08 PM

## 2024-11-12 LAB
ANION GAP SERPL CALCULATED.3IONS-SCNC: 12 MMOL/L (ref 7–16)
BASOPHILS # BLD: 0 K/UL (ref 0–0.2)
BASOPHILS NFR BLD: 0 % (ref 0–2)
BUN SERPL-MCNC: 34 MG/DL (ref 6–23)
CALCIUM SERPL-MCNC: 8.6 MG/DL (ref 8.6–10.2)
CHLORIDE SERPL-SCNC: 97 MMOL/L (ref 98–107)
CO2 SERPL-SCNC: 19 MMOL/L (ref 22–29)
CREAT SERPL-MCNC: 1.4 MG/DL (ref 0.5–1)
CREAT UR-MCNC: 121.4 MG/DL (ref 29–226)
EOSINOPHIL # BLD: 0 K/UL (ref 0.05–0.5)
EOSINOPHILS RELATIVE PERCENT: 0 % (ref 0–6)
ERYTHROCYTE [DISTWIDTH] IN BLOOD BY AUTOMATED COUNT: 18 % (ref 11.5–15)
GFR, ESTIMATED: 36 ML/MIN/1.73M2
GLUCOSE SERPL-MCNC: 114 MG/DL (ref 74–99)
HCT VFR BLD AUTO: 23 % (ref 34–48)
HGB BLD-MCNC: 7.4 G/DL (ref 11.5–15.5)
LYMPHOCYTES NFR BLD: 0.93 K/UL (ref 1.5–4)
LYMPHOCYTES RELATIVE PERCENT: 12 % (ref 20–42)
MCH RBC QN AUTO: 30.2 PG (ref 26–35)
MCHC RBC AUTO-ENTMCNC: 32.2 G/DL (ref 32–34.5)
MCV RBC AUTO: 93.9 FL (ref 80–99.9)
MONOCYTES NFR BLD: 0.86 K/UL (ref 0.1–0.95)
MONOCYTES NFR BLD: 11 % (ref 2–12)
NEUTROPHILS NFR BLD: 77 % (ref 43–80)
NEUTS SEG NFR BLD: 5.92 K/UL (ref 1.8–7.3)
NUCLEATED RED BLOOD CELLS: 13 PER 100 WBC
PLATELET CONFIRMATION: NORMAL
PLATELET, FLUORESCENCE: 46 K/UL (ref 130–450)
PMV BLD AUTO: ABNORMAL FL (ref 7–12)
POTASSIUM SERPL-SCNC: 4.4 MMOL/L (ref 3.5–5)
RBC # BLD AUTO: 2.45 M/UL (ref 3.5–5.5)
RBC # BLD: ABNORMAL 10*6/UL
SODIUM SERPL-SCNC: 128 MMOL/L (ref 132–146)
SODIUM UR-SCNC: <20 MMOL/L
WBC OTHER # BLD: 7.7 K/UL (ref 4.5–11.5)

## 2024-11-12 PROCEDURE — 85025 COMPLETE CBC W/AUTO DIFF WBC: CPT

## 2024-11-12 PROCEDURE — 99232 SBSQ HOSP IP/OBS MODERATE 35: CPT | Performed by: STUDENT IN AN ORGANIZED HEALTH CARE EDUCATION/TRAINING PROGRAM

## 2024-11-12 PROCEDURE — 6370000000 HC RX 637 (ALT 250 FOR IP): Performed by: NURSE PRACTITIONER

## 2024-11-12 PROCEDURE — 80048 BASIC METABOLIC PNL TOTAL CA: CPT

## 2024-11-12 PROCEDURE — 6360000002 HC RX W HCPCS: Performed by: NURSE PRACTITIONER

## 2024-11-12 PROCEDURE — 97165 OT EVAL LOW COMPLEX 30 MIN: CPT

## 2024-11-12 PROCEDURE — 2060000000 HC ICU INTERMEDIATE R&B

## 2024-11-12 PROCEDURE — 82570 ASSAY OF URINE CREATININE: CPT

## 2024-11-12 PROCEDURE — 99232 SBSQ HOSP IP/OBS MODERATE 35: CPT | Performed by: INTERNAL MEDICINE

## 2024-11-12 PROCEDURE — 97161 PT EVAL LOW COMPLEX 20 MIN: CPT

## 2024-11-12 PROCEDURE — 2580000003 HC RX 258: Performed by: NURSE PRACTITIONER

## 2024-11-12 PROCEDURE — 36415 COLL VENOUS BLD VENIPUNCTURE: CPT

## 2024-11-12 PROCEDURE — 6370000000 HC RX 637 (ALT 250 FOR IP): Performed by: STUDENT IN AN ORGANIZED HEALTH CARE EDUCATION/TRAINING PROGRAM

## 2024-11-12 PROCEDURE — 51798 US URINE CAPACITY MEASURE: CPT

## 2024-11-12 PROCEDURE — 84300 ASSAY OF URINE SODIUM: CPT

## 2024-11-12 PROCEDURE — 6370000000 HC RX 637 (ALT 250 FOR IP): Performed by: INTERNAL MEDICINE

## 2024-11-12 RX ORDER — OXYCODONE AND ACETAMINOPHEN 5; 325 MG/1; MG/1
1 TABLET ORAL DAILY PRN
Status: DISCONTINUED | OUTPATIENT
Start: 2024-11-12 | End: 2024-11-16 | Stop reason: HOSPADM

## 2024-11-12 RX ORDER — METOPROLOL SUCCINATE 25 MG/1
12.5 TABLET, EXTENDED RELEASE ORAL DAILY
Status: DISCONTINUED | OUTPATIENT
Start: 2024-11-12 | End: 2024-11-16 | Stop reason: HOSPADM

## 2024-11-12 RX ORDER — OXYCODONE AND ACETAMINOPHEN 5; 325 MG/1; MG/1
1 TABLET ORAL 2 TIMES DAILY
Status: DISCONTINUED | OUTPATIENT
Start: 2024-11-12 | End: 2024-11-16 | Stop reason: HOSPADM

## 2024-11-12 RX ADMIN — BUSPIRONE HYDROCHLORIDE 5 MG: 5 TABLET ORAL at 20:51

## 2024-11-12 RX ADMIN — ROPINIROLE HYDROCHLORIDE 2 MG: 2 TABLET, FILM COATED ORAL at 19:12

## 2024-11-12 RX ADMIN — BUSPIRONE HYDROCHLORIDE 5 MG: 5 TABLET ORAL at 08:49

## 2024-11-12 RX ADMIN — DOCUSATE SODIUM 100 MG: 100 CAPSULE, LIQUID FILLED ORAL at 08:49

## 2024-11-12 RX ADMIN — ASPIRIN 81 MG: 81 TABLET, COATED ORAL at 08:49

## 2024-11-12 RX ADMIN — ONDANSETRON 4 MG: 2 INJECTION INTRAMUSCULAR; INTRAVENOUS at 20:54

## 2024-11-12 RX ADMIN — OXYCODONE HYDROCHLORIDE AND ACETAMINOPHEN 1 TABLET: 5; 325 TABLET ORAL at 20:50

## 2024-11-12 RX ADMIN — Medication 3 MG: at 20:50

## 2024-11-12 RX ADMIN — SODIUM CHLORIDE, PRESERVATIVE FREE 10 ML: 5 INJECTION INTRAVENOUS at 08:51

## 2024-11-12 RX ADMIN — SERTRALINE HYDROCHLORIDE 50 MG: 50 TABLET ORAL at 08:49

## 2024-11-12 RX ADMIN — SODIUM CHLORIDE, PRESERVATIVE FREE 40 MG: 5 INJECTION INTRAVENOUS at 20:49

## 2024-11-12 RX ADMIN — SODIUM CHLORIDE, PRESERVATIVE FREE 10 ML: 5 INJECTION INTRAVENOUS at 20:49

## 2024-11-12 RX ADMIN — SODIUM CHLORIDE, PRESERVATIVE FREE 40 MG: 5 INJECTION INTRAVENOUS at 08:49

## 2024-11-12 RX ADMIN — DOCUSATE SODIUM 100 MG: 100 CAPSULE, LIQUID FILLED ORAL at 20:50

## 2024-11-12 RX ADMIN — CETIRIZINE HYDROCHLORIDE 5 MG: 10 TABLET, FILM COATED ORAL at 08:49

## 2024-11-12 RX ADMIN — POLYETHYLENE GLYCOL 3350 17 G: 17 POWDER, FOR SOLUTION ORAL at 08:51

## 2024-11-12 RX ADMIN — LORAZEPAM 0.5 MG: 0.5 TABLET ORAL at 08:49

## 2024-11-12 RX ADMIN — METOPROLOL SUCCINATE 12.5 MG: 25 TABLET, EXTENDED RELEASE ORAL at 08:54

## 2024-11-12 ASSESSMENT — PAIN DESCRIPTION - PAIN TYPE: TYPE: ACUTE PAIN

## 2024-11-12 ASSESSMENT — PAIN DESCRIPTION - DESCRIPTORS: DESCRIPTORS: DISCOMFORT

## 2024-11-12 ASSESSMENT — PAIN SCALES - GENERAL
PAINLEVEL_OUTOF10: 0
PAINLEVEL_OUTOF10: 0
PAINLEVEL_OUTOF10: 7

## 2024-11-12 ASSESSMENT — PAIN DESCRIPTION - FREQUENCY: FREQUENCY: INTERMITTENT

## 2024-11-12 ASSESSMENT — PAIN DESCRIPTION - LOCATION: LOCATION: ABDOMEN

## 2024-11-12 ASSESSMENT — PAIN - FUNCTIONAL ASSESSMENT: PAIN_FUNCTIONAL_ASSESSMENT: PREVENTS OR INTERFERES SOME ACTIVE ACTIVITIES AND ADLS

## 2024-11-12 ASSESSMENT — PAIN DESCRIPTION - ORIENTATION: ORIENTATION: ANTERIOR

## 2024-11-12 ASSESSMENT — PAIN DESCRIPTION - ONSET: ONSET: GRADUAL

## 2024-11-12 NOTE — PROGRESS NOTES
INPATIENT CARDIOLOGY FOLLOW-UP    Name: Breanna Luz    Age: 89 y.o.    Date of Admission: 11/8/2024  8:52 PM    Date of Service: 11/12/2024    Chief Complaint: Follow-up for elevated troponin and demand ischemia    Interim History:  No new overnight cardiac complaints.  Appears more calm today.  No acute distress.  Currently with no complaints of CP, SOB, palpitations, dizziness, or lightheadedness. SR on telemetry.    Was having some symptoms of benzo withdrawal.  Reinitiated on Xanax.  Doing better.  Echocardiogram done yesterday.  Summarized below.    Review of Systems:   Cardiac: As per HPI  General: No fever, chills  Pulmonary: As per HPI  HEENT: No visual disturbances, difficult swallowing  GI: No nausea, vomiting  Endocrine: No thyroid disease or DM  Musculoskeletal: ROBIN x 4, no focal motor deficits  Skin: Intact, no rashes  Neuro/Psych: No headache or seizures    Problem List:  Patient Active Problem List   Diagnosis    Primary hypertension    Hyperlipidemia    Coronary artery disease involving native coronary artery of native heart without angina pectoris    Protein calorie malnutrition (HCC)    Primary osteoarthritis involving multiple joints    Spondylolisthesis of lumbar region    Anxiety disorder    Unsteady gait    History of falling, presenting hazards to health    Constipation    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

## 2024-11-12 NOTE — DISCHARGE INSTR - COC
Continuity of Care Form    Patient Name: Breanna Luz   :  1931  MRN:  [id number]    Admit date:  2024  Discharge date:  11/15/2024    Code Status Order: DNR-CCA   Advance Directives:   Advance Care Flowsheet Documentation             Admitting Physician:  Ty Tomlin DO  PCP: No, Pcp    Discharging Nurse: Clementina Velez RN  Discharging Hospital Unit/Room#: 0419/0419-A  Discharging Unit Phone Number: [phone]    Emergency Contact:   Extended Emergency Contact Information  Primary Emergency Contact: Celestino Luz  Address: [address] [place] [address] [place] [address] [facility]  Home Phone: [phone]  Mobile Phone: [phone]  Relation: Child  Secondary Emergency Contact: Ginny Mathew  Mobile Phone: [phone]  Relation: Child    Past Surgical History:  Past Surgical History:   Procedure Laterality Date    ANGIOPLASTY      ECHO COMPL W DOP COLOR FLOW  2012         HIP FRACTURE SURGERY Left 5/25/15       Immunization History:   Immunization History   Administered Date(s) Administered    COVID-19, PFIZER Bivalent, DO NOT Dilute, (age 12y+), IM, 30 mcg/0.3 mL 2023    COVID-19, PFIZER PURPLE top, DILUTE for use, (age 12 y+), 30mcg/0.3mL 2021, 2021, 10/14/2021, 2022    COVID-19, PFIZER, (age 12y+), IM, 30mcg/0.3mL 2024    Influenza Vaccine, unspecified formulation 2016    Influenza Virus Vaccine 10/22/2013, 10/05/2014, 2015, 2016, 2017    Influenza, FLUAD, (age 65 y+), IM, Quadv, 0.5mL 10/29/2020, 2021, 2022, 10/26/2023    Influenza, FLUAD, (age 65 y+), IM, Trivalent PF, 0.5mL 2017    Influenza, FLUARIX, FLULAVAL, FLUZONE (age 6 mo+) and AFLURIA, (age 3 y+), Quadv PF, 0.5mL 2019    Influenza, FLUZONE High Dose, (age 65 y+), IM, Trivalent PF, 0.5mL 2018       Active Problems:  Patient Active Problem List   Diagnosis Code    Primary hypertension I10    Hyperlipidemia E78.5    Coronary  Fair    Recommended Labs or Other Treatments After Discharge: CBC, BMP within 1 week     Physician Certification: I certify the above information and transfer of Breanna Luz  is necessary for the continuing treatment of the diagnosis listed and that she requires Skilled Nursing Facility for less 30 days.     Update Admission H&P: Changes in H&P as follows - Please refer to discharge summary     PHYSICIAN SIGNATURE:  Electronically signed by Fam Fairchild MD on 11/15/24 at 1:21 PM EST

## 2024-11-12 NOTE — PROGRESS NOTES
Nutrition Assessment    Type and Reason for Visit:  Initial, Positive nutrition screen    Nutrition Recommendations/Plan:   Continue current diet and ONS with meals, as tolerated     Nutrition Assessment:    Pt admit from [place] 2/2 anemia. S/p Bleeding scan without evidence of GI bleed. No visible signs GIB and pt son declines endoscopic eval. Diet advanced from CLD to Regular now. Will advance ONS and order for every meal to optimize nutrient intake.    Nutrition Related Findings:    A&Ox3, soft abd +BS, impaired hearing, missing teeth, I/O WNL Wound Type: None (red toes)       Current Nutrition Intake & Therapies:    Average Meal Intake: Unable to assess  Average Supplements Intake: None Ordered  ADULT DIET; Regular  ADULT ORAL NUTRITION SUPPLEMENT; Breakfast, Dinner; Standard High Calorie/High Protein Oral Supplement  ADULT ORAL NUTRITION SUPPLEMENT; Lunch; Frozen Oral Supplement    Anthropometric Measures:  Height: 157.5 cm (5' 2\")  Ideal Body Weight (IBW): 110 lbs (50 kg)    Admission Body Weight: 63.6 kg (140 lb 3.4 oz)  Current Body Weight: 63.6 kg (140 lb 3.4 oz), 127.5 % IBW. Weight Source: Bed scale (11/12)  Current BMI (kg/m2): 25.6  Usual Body Weight: 63.9 kg (140 lb 13 oz) (11/7/2023 [facility])     % Weight Change (Calculated): -0.4                    BMI Categories: Overweight (BMI 25.0-29.9)    Nutrition Interventions:   Food and/or Nutrient Delivery: Continue Current Diet, Continue Oral Nutrition Supplement  Nutrition Education/Counseling: No recommendation at this time  Coordination of Nutrition Care: Continue to monitor while inpatient       Goals:  Goals: PO intake 50% or greater, by next RD assessment  Type of Goal: New goal  Previous Goal Met: New Goal    Nutrition Monitoring and Evaluation:   Behavioral-Environmental Outcomes: None Identified  Food/Nutrient Intake Outcomes: Food and Nutrient Intake, Supplement Intake  Physical Signs/Symptoms Outcomes: Biochemical Data, GI Status, Fluid Status or

## 2024-11-12 NOTE — PLAN OF CARE
Problem: Nutrition Deficit:  Goal: Optimize nutritional status  11/11/2024 2138 by Saji Pinto, RN  Outcome: Progressing  11/11/2024 1715 by Young Michael, RN  Outcome: Not Progressing

## 2024-11-12 NOTE — PROGRESS NOTES
PROGRESS NOTE        Patient Presents with/Seen in Consultation For      Reason for Consult: GIB     CHIEF COMPLAINT: Oxygen saturation in the 70s on room air    Subjective:     Patient seen laying in bed, resting comfortably. No abd pain or nausea. No overt signs of bleeding reported. No family currently at bedside.     Review of Systems  Aside from what was mentioned in the PMH and HPI, essentially unremarkable, all others negative.    Objective:     BP (!) 99/54   Pulse 96   Temp 98 °F (36.7 °C) (Temporal)   Resp 16   Ht 1.575 m (5' 2\")   Wt 63.6 kg (140 lb 3.4 oz)   LMP  (LMP Unknown)   SpO2 100%   BMI 25.65 kg/m²     General appearance: alert, awake, laying in bed, and cooperative  Eyes: conjunctiva pale, sclera anicteric. PERRL.  Lungs: Expiratory wheeze to auscultation bilaterally  Heart: regular rate and rhythm, no murmur, 2+ pulses; trace edema  Abdomen: soft, non-tender; bowel sounds normal; no masses,  no organomegaly  Extremities: extremities trace edema  Pulses: 2+ and symmetric  Skin: Skin color, texture, turgor normal.   Neurologic: Grossly normal    metoprolol succinate (TOPROL XL) extended release tablet 12.5 mg, Daily  oxyCODONE-acetaminophen (PERCOCET) 5-325 MG per tablet 1 tablet, Daily PRN  oxyCODONE-acetaminophen (PERCOCET) 5-325 MG per tablet 1 tablet, BID  polyethylene glycol (GLYCOLAX) packet 17 g, Daily  LORazepam (ATIVAN) tablet 0.5 mg, Daily  sodium chloride flush 0.9 % injection 5-40 mL, 2 times per day  sodium chloride flush 0.9 % injection 5-40 mL, PRN  0.9 % sodium chloride infusion, PRN  ondansetron (ZOFRAN-ODT) disintegrating tablet 4 mg, Q8H PRN   Or  ondansetron (ZOFRAN) injection 4 mg, Q6H PRN  acetaminophen (TYLENOL) tablet 650 mg, Q6H PRN   Or  acetaminophen (TYLENOL) suppository 650 mg, Q6H PRN  pantoprazole (PROTONIX) 40 mg in sodium chloride (PF) 0.9 % 10 mL injection, Q12H  aspirin EC tablet 81 mg, Daily  busPIRone (BUSPAR) tablet 5 mg, BID  calcium carbonate (TUMS)

## 2024-11-12 NOTE — PROGRESS NOTES
Occupational Therapy    OCCUPATIONAL THERAPY INITIAL EVALUATION    [facility]   [address], [place]         Date:2024                                                  Patient Name: Breanna Luz    MRN: [id number]    : 1931    Room: [address] [place] [address]      Evaluating OT: Magalis Call OTR/L   [id number]      Referring Provider:Jimena Rowley MD     Specific Provider Orders/Date:OT eval and treat 2024      Diagnosis:  Dehydration [E86.0]  Acute blood loss anemia [D62]  Demand ischemia (HCC) [I24.89]  Elevated troponin [R79.89]  Bilateral pleural effusion [J90]  HELEN (acute kidney injury) (HCC) [N17.9]  Anemia requiring transfusions [D64.9]  Gastrointestinal hemorrhage, unspecified gastrointestinal hemorrhage type [K92.2]     Pertinent Medical History: HTN, hip surgery 2015 , anxiety     Precautions:  Fall Risk,      Assessment of current deficits    [x] Functional mobility  [x]ADLs  [x] Strength               [x]Cognition    [x] Functional transfers   [x] IADLs         [x] Safety Awareness   [x]Endurance    [] Fine Coordination              [x] Balance      [] Vision/perception   []Sensation     []Gross Motor Coordination  [] ROM  [] Delirium                   [] Motor Control     OT PLAN OF CARE   OT POC based on physician orders, patient diagnosis and results of clinical assessment    Frequency/Duration  2-4 days/wk for 2 - 4weeks PRN   Specific OT Treatment Interventions to include:   ADL retraining/adapted techniques and AE recommendations to increase functional independence within precautions                    Energy conservation techniques to improve tolerance for selfcare routine   Functional transfer/mobility training/DME recommendations for increased independence, safety and fall prevention         Patient/family education to increase safety and functional independence             Environmental modifications for safe mobility and

## 2024-11-12 NOTE — PROGRESS NOTES
Gastrointestinal hemorrhage with melena    Elevated troponin  Resolved Problems:    * No resolved hospital problems. *      Plan:  Acute blood loss anemia on DARREN- admit Hgb 6.8 improved 7.9 post transfusion, baseline in 4/2024 ~8.5, monitor and transfuse if less than 7  GI bleed- Hgb 6.8 on admit. Stool for occult blood positive in ED.  Consult GI, no plans for EGD/c-scope at this time, nuc med bleed scan negative 11/10. Continue PPI twice daily  Elevated troponin- likely demand ischemia from above, Trop trend 1000->828->946. Per ED note no ST depression seen on EKG. Cards following, no acute intervention. ECHO 11/11 EF 40-45% moderate global hypokinesis, severe MR, moderate pulm HTN  CHFrEF chronic- admit BNP 31,654. Cards following. ECHO 20112 EF 55-60%, repeat ECHO as above, start low dose BB, consider SGLT2i as outpt  Oliguric HELEN on Stage 3a CKD- admit Cr 1.5, b/l Cr difficult to discern but likely b/w 1.0-1.4 as per results from 4/2024. Monitor, likely related to blood loss as above  Hyponatremia- 128 on admit, monitor. Likely multifactorial in setting of HELEN, poor PO intake and relative HoTN    -hypoNa persists 128-130, notably with limited UOP (reportedly ~50cc today though not recorded), encourage increasing PO intake and reassess tomorrow, check urine studies, with poor intake may require IVF and consider Nephro c/s tomorrow if not improving    Dispo: anticipate SNF next 1-2d    NOTE: Portions of this report may have been transcribed using voice recognition software. Every effort was made to ensure accuracy; however, inadvertent computerized transcription errors may be present.  Electronically signed by Jimena Rowley MD on 11/12/2024 at 4:36 PM

## 2024-11-12 NOTE — CARE COORDINATION
Social Work:    Fozia at [facility] advised social service that their admissions coordinator at [facility] is contacting patient's on [place] to advise him that Mrs. Luz will need to transfer to [facility] prior to return to [facility]  Fozia advises that they are starting the authorization today.  (N-17, [place], ambulance completed)    Electronically signed by SIENNA Cloud on 11/12/2024 at 1:31 PM

## 2024-11-12 NOTE — PROGRESS NOTES
Physical Therapy  Facility/Department: [address] INTERNAL MEDICINE 2  Physical Therapy Initial Assessment    Name: Breanna Luz  : 1931  MRN: [id number]  Date of Service: 2024        Patient Diagnosis(es): The primary encounter diagnosis was Acute blood loss anemia. Diagnoses of Anemia requiring transfusions, Gastrointestinal hemorrhage, unspecified gastrointestinal hemorrhage type, Elevated troponin, Demand ischemia (HCC), HELEN (acute kidney injury) (HCC), Dehydration, and Bilateral pleural effusion were also pertinent to this visit.  Past Medical History:  has a past medical history of Anemia due to acute blood loss, C2 cervical fracture (HCC), CAD (coronary artery disease), Fracture of left hip (HCC), HTN (hypertension), Hyperlipidemia, Hyperlipidemia, Hypertension, and Protein calorie malnutrition (HCC).  Past Surgical History:  has a past surgical history that includes angioplasty; ECHO Compl W Dop Color Flow (2012); and Hip fracture surgery (Left, 5/25/15).    Evaluating Therapist: Paradise Reddy PT    Room #:  0419/0419-A  Diagnosis:  Dehydration [E86.0]  Acute blood loss anemia [D62]  Demand ischemia (HCC) [I24.89]  Elevated troponin [R79.89]  Bilateral pleural effusion [J90]  HELEN (acute kidney injury) (HCC) [N17.9]  Anemia requiring transfusions [D64.9]  Gastrointestinal hemorrhage, unspecified gastrointestinal hemorrhage type [K92.2]  PMHx/PSHx:  Anemia, HTN, CAD  Precautions:  falls, alarm    Social:  Pt admitted from longterm. Ambulates with ww   Initial Evaluation  Date: 24 Treatment      Short Term/ Long Term   Goals   Was pt agreeable to Eval/treatment? With encouragement     Does pt have pain? None reported     Bed Mobility  Rolling: min assist  Supine to sit: min assist  Sit to supine: NT  Scooting: min assist  independent   Transfers Sit to stand: min assist  Stand to sit: min assist  Stand pivot: min assist  SBA   Ambulation    10 feet with ww with min assist, pt declined further  assistive device  [] Stair Training in preparation for safe discharge home and/or into the community   [] Positioning to prevent skin breakdown and contractures  [x] Safety and Education Training   [x] Patient/Caregiver Education   [] HEP  [] Other     PT long term treatment goals are located in above grid    Frequency of treatments: 2-5x/week x 5 days.    Time in  0950  Time out  1005        Evaluation Time includes thorough review of current medical information, gathering information on past medical history/social history and prior level of function, completion of standardized testing/informal observation of tasks, assessment of data and education on plan of care and goals.      CPT codes:  [x] Low Complexity PT evaluation 27717  [] Moderate Complexity PT evaluation 76118  [] High Complexity PT evaluation 38926  [] PT Re-evaluation 87393  [] Gait training 78668 minutes  [] Manual therapy 89327 minutes  [] Therapeutic activities 15455 minutes  [] Therapeutic exercises 07367 minutes  [] Neuromuscular reeducation 12631 minutes     Paradise Reddy PT [id number]

## 2024-11-13 LAB
ANION GAP SERPL CALCULATED.3IONS-SCNC: 12 MMOL/L (ref 7–16)
ANION GAP SERPL CALCULATED.3IONS-SCNC: 12 MMOL/L (ref 7–16)
BASOPHILS # BLD: 0 K/UL (ref 0–0.2)
BASOPHILS NFR BLD: 0 % (ref 0–2)
BUN SERPL-MCNC: 30 MG/DL (ref 6–23)
BUN SERPL-MCNC: 33 MG/DL (ref 6–23)
CALCIUM SERPL-MCNC: 8.5 MG/DL (ref 8.6–10.2)
CALCIUM SERPL-MCNC: 8.8 MG/DL (ref 8.6–10.2)
CHLORIDE SERPL-SCNC: 96 MMOL/L (ref 98–107)
CHLORIDE SERPL-SCNC: 96 MMOL/L (ref 98–107)
CO2 SERPL-SCNC: 18 MMOL/L (ref 22–29)
CO2 SERPL-SCNC: 20 MMOL/L (ref 22–29)
CREAT SERPL-MCNC: 1.2 MG/DL (ref 0.5–1)
CREAT SERPL-MCNC: 1.3 MG/DL (ref 0.5–1)
EOSINOPHIL # BLD: 0 K/UL (ref 0.05–0.5)
EOSINOPHILS RELATIVE PERCENT: 0 % (ref 0–6)
ERYTHROCYTE [DISTWIDTH] IN BLOOD BY AUTOMATED COUNT: 18.5 % (ref 11.5–15)
GFR, ESTIMATED: 38 ML/MIN/1.73M2
GFR, ESTIMATED: 42 ML/MIN/1.73M2
GLUCOSE SERPL-MCNC: 110 MG/DL (ref 74–99)
GLUCOSE SERPL-MCNC: 96 MG/DL (ref 74–99)
HCT VFR BLD AUTO: 24.7 % (ref 34–48)
HGB BLD-MCNC: 8 G/DL (ref 11.5–15.5)
LYMPHOCYTES NFR BLD: 1.15 K/UL (ref 1.5–4)
LYMPHOCYTES RELATIVE PERCENT: 16 % (ref 20–42)
MCH RBC QN AUTO: 30.3 PG (ref 26–35)
MCHC RBC AUTO-ENTMCNC: 32.4 G/DL (ref 32–34.5)
MCV RBC AUTO: 93.6 FL (ref 80–99.9)
MONOCYTES NFR BLD: 0.7 K/UL (ref 0.1–0.95)
MONOCYTES NFR BLD: 10 % (ref 2–12)
MYELOCYTES ABSOLUTE COUNT: 0.13 K/UL
MYELOCYTES: 2 %
NEUTROPHILS NFR BLD: 73 % (ref 43–80)
NEUTS SEG NFR BLD: 5.22 K/UL (ref 1.8–7.3)
NUCLEATED RED BLOOD CELLS: 12 PER 100 WBC
OSMOLALITY UR: 6 MOSM/KG (ref 300–900)
PLATELET CONFIRMATION: NORMAL
PLATELET, FLUORESCENCE: 45 K/UL (ref 130–450)
PMV BLD AUTO: ABNORMAL FL (ref 7–12)
POTASSIUM SERPL-SCNC: 4.4 MMOL/L (ref 3.5–5)
POTASSIUM SERPL-SCNC: 4.4 MMOL/L (ref 3.5–5)
RBC # BLD AUTO: 2.64 M/UL (ref 3.5–5.5)
RBC # BLD: ABNORMAL 10*6/UL
SODIUM SERPL-SCNC: 126 MMOL/L (ref 132–146)
SODIUM SERPL-SCNC: 128 MMOL/L (ref 132–146)
SODIUM UR-SCNC: <20 MMOL/L
TSH SERPL DL<=0.05 MIU/L-ACNC: 2.25 UIU/ML (ref 0.27–4.2)
URATE SERPL-MCNC: 10.9 MG/DL (ref 2.4–5.7)
WBC OTHER # BLD: 7.2 K/UL (ref 4.5–11.5)

## 2024-11-13 PROCEDURE — 83935 ASSAY OF URINE OSMOLALITY: CPT

## 2024-11-13 PROCEDURE — 6370000000 HC RX 637 (ALT 250 FOR IP): Performed by: INTERNAL MEDICINE

## 2024-11-13 PROCEDURE — 6370000000 HC RX 637 (ALT 250 FOR IP): Performed by: NURSE PRACTITIONER

## 2024-11-13 PROCEDURE — 99232 SBSQ HOSP IP/OBS MODERATE 35: CPT | Performed by: INTERNAL MEDICINE

## 2024-11-13 PROCEDURE — 2060000000 HC ICU INTERMEDIATE R&B

## 2024-11-13 PROCEDURE — 6360000002 HC RX W HCPCS: Performed by: STUDENT IN AN ORGANIZED HEALTH CARE EDUCATION/TRAINING PROGRAM

## 2024-11-13 PROCEDURE — 51798 US URINE CAPACITY MEASURE: CPT

## 2024-11-13 PROCEDURE — 2580000003 HC RX 258: Performed by: STUDENT IN AN ORGANIZED HEALTH CARE EDUCATION/TRAINING PROGRAM

## 2024-11-13 PROCEDURE — 80048 BASIC METABOLIC PNL TOTAL CA: CPT

## 2024-11-13 PROCEDURE — 6370000000 HC RX 637 (ALT 250 FOR IP): Performed by: STUDENT IN AN ORGANIZED HEALTH CARE EDUCATION/TRAINING PROGRAM

## 2024-11-13 PROCEDURE — 84443 ASSAY THYROID STIM HORMONE: CPT

## 2024-11-13 PROCEDURE — 51702 INSERT TEMP BLADDER CATH: CPT

## 2024-11-13 PROCEDURE — 97530 THERAPEUTIC ACTIVITIES: CPT

## 2024-11-13 PROCEDURE — 85025 COMPLETE CBC W/AUTO DIFF WBC: CPT

## 2024-11-13 PROCEDURE — 6360000002 HC RX W HCPCS: Performed by: NURSE PRACTITIONER

## 2024-11-13 PROCEDURE — 99232 SBSQ HOSP IP/OBS MODERATE 35: CPT | Performed by: STUDENT IN AN ORGANIZED HEALTH CARE EDUCATION/TRAINING PROGRAM

## 2024-11-13 PROCEDURE — 2580000003 HC RX 258: Performed by: NURSE PRACTITIONER

## 2024-11-13 PROCEDURE — 36415 COLL VENOUS BLD VENIPUNCTURE: CPT

## 2024-11-13 PROCEDURE — 84550 ASSAY OF BLOOD/URIC ACID: CPT

## 2024-11-13 PROCEDURE — 84300 ASSAY OF URINE SODIUM: CPT

## 2024-11-13 RX ORDER — FUROSEMIDE 10 MG/ML
20 INJECTION INTRAMUSCULAR; INTRAVENOUS ONCE
Status: COMPLETED | OUTPATIENT
Start: 2024-11-13 | End: 2024-11-13

## 2024-11-13 RX ORDER — SODIUM CHLORIDE 9 MG/ML
INJECTION, SOLUTION INTRAVENOUS CONTINUOUS
Status: DISCONTINUED | OUTPATIENT
Start: 2024-11-13 | End: 2024-11-13

## 2024-11-13 RX ADMIN — ASPIRIN 81 MG: 81 TABLET, COATED ORAL at 07:57

## 2024-11-13 RX ADMIN — OXYCODONE HYDROCHLORIDE AND ACETAMINOPHEN 1 TABLET: 5; 325 TABLET ORAL at 20:30

## 2024-11-13 RX ADMIN — SODIUM CHLORIDE, PRESERVATIVE FREE 10 ML: 5 INJECTION INTRAVENOUS at 20:31

## 2024-11-13 RX ADMIN — LORAZEPAM 0.5 MG: 0.5 TABLET ORAL at 07:59

## 2024-11-13 RX ADMIN — SERTRALINE HYDROCHLORIDE 50 MG: 50 TABLET ORAL at 07:58

## 2024-11-13 RX ADMIN — FUROSEMIDE 20 MG: 10 INJECTION, SOLUTION INTRAMUSCULAR; INTRAVENOUS at 17:41

## 2024-11-13 RX ADMIN — BUSPIRONE HYDROCHLORIDE 5 MG: 5 TABLET ORAL at 20:31

## 2024-11-13 RX ADMIN — SODIUM CHLORIDE, PRESERVATIVE FREE 40 MG: 5 INJECTION INTRAVENOUS at 20:31

## 2024-11-13 RX ADMIN — BUSPIRONE HYDROCHLORIDE 5 MG: 5 TABLET ORAL at 07:58

## 2024-11-13 RX ADMIN — POLYETHYLENE GLYCOL 3350 17 G: 17 POWDER, FOR SOLUTION ORAL at 07:57

## 2024-11-13 RX ADMIN — DOCUSATE SODIUM 100 MG: 100 CAPSULE, LIQUID FILLED ORAL at 07:58

## 2024-11-13 RX ADMIN — SODIUM CHLORIDE: 9 INJECTION, SOLUTION INTRAVENOUS at 09:02

## 2024-11-13 RX ADMIN — ROPINIROLE HYDROCHLORIDE 2 MG: 2 TABLET, FILM COATED ORAL at 20:30

## 2024-11-13 RX ADMIN — METOPROLOL SUCCINATE 12.5 MG: 25 TABLET, EXTENDED RELEASE ORAL at 07:58

## 2024-11-13 RX ADMIN — OXYCODONE HYDROCHLORIDE AND ACETAMINOPHEN 1 TABLET: 5; 325 TABLET ORAL at 07:59

## 2024-11-13 RX ADMIN — Medication 3 MG: at 20:31

## 2024-11-13 RX ADMIN — CETIRIZINE HYDROCHLORIDE 5 MG: 10 TABLET, FILM COATED ORAL at 07:58

## 2024-11-13 RX ADMIN — SODIUM CHLORIDE, PRESERVATIVE FREE 40 MG: 5 INJECTION INTRAVENOUS at 08:00

## 2024-11-13 RX ADMIN — SODIUM CHLORIDE, PRESERVATIVE FREE 10 ML: 5 INJECTION INTRAVENOUS at 08:00

## 2024-11-13 ASSESSMENT — PAIN - FUNCTIONAL ASSESSMENT: PAIN_FUNCTIONAL_ASSESSMENT: ACTIVITIES ARE NOT PREVENTED

## 2024-11-13 ASSESSMENT — PAIN SCALES - PAIN ASSESSMENT IN ADVANCED DEMENTIA (PAINAD)
BREATHING: OCCASIONAL LABORED BREATHING, SHORT PERIOD OF HYPERVENTILATION
NEGVOCALIZATION: OCCASIONAL MOAN/GROAN, LOW SPEECH, NEGATIVE/DISAPPROVING QUALITY
FACIALEXPRESSION: FACIAL GRIMACING
CONSOLABILITY: DISTRACTED OR REASSURED BY VOICE/TOUCH
TOTALSCORE: 6
BODYLANGUAGE: TENSE, DISTRESSED PACING, FIDGETING

## 2024-11-13 ASSESSMENT — PAIN DESCRIPTION - ORIENTATION: ORIENTATION: LEFT

## 2024-11-13 ASSESSMENT — PAIN DESCRIPTION - DESCRIPTORS: DESCRIPTORS: ACHING

## 2024-11-13 ASSESSMENT — PAIN DESCRIPTION - LOCATION: LOCATION: RIB CAGE

## 2024-11-13 ASSESSMENT — PAIN SCALES - GENERAL: PAINLEVEL_OUTOF10: 5

## 2024-11-13 NOTE — PLAN OF CARE
Problem: Discharge Planning  Goal: Discharge to home or other facility with appropriate resources  Outcome: Progressing     Problem: Pain  Goal: Verbalizes/displays adequate comfort level or baseline comfort level  Outcome: Progressing     Problem: Safety - Adult  Goal: Free from fall injury  Outcome: Progressing     Problem: Confusion  Goal: Confusion, delirium, dementia, or psychosis is improved or at baseline  Description: INTERVENTIONS:  1. Assess for possible contributors to thought disturbance, including medications, impaired vision or hearing, underlying metabolic abnormalities, dehydration, psychiatric diagnoses, and notify attending LIP  2. [place] high risk fall precautions, as indicated  3. Provide frequent short contacts to provide reality reorientation, refocusing and direction  4. Decrease environmental stimuli, including noise as appropriate  5. Monitor and intervene to maintain adequate nutrition, hydration, elimination, sleep and activity  6. If unable to ensure safety without constant attention obtain sitter and review sitter guidelines with assigned personnel  7. Initiate Psychosocial CNS and Spiritual Care consult, as indicated  Outcome: Progressing     Problem: ABCDS Injury Assessment  Goal: Absence of physical injury  Outcome: Progressing     Problem: Skin/Tissue Integrity  Goal: Absence of new skin breakdown  Description: 1.  Monitor for areas of redness and/or skin breakdown  2.  Assess vascular access sites hourly  3.  Every 4-6 hours minimum:  Change oxygen saturation probe site  4.  Every 4-6 hours:  If on nasal continuous positive airway pressure, respiratory therapy assess nares and determine need for appliance change or resting period.  Outcome: Progressing     Problem: Nutrition Deficit:  Goal: Optimize nutritional status  Outcome: Progressing  Flowsheets (Taken 11/12/2024 9512 by Ameena Miller, RD, CNSC, LD)  Nutrient intake appropriate for improving, restoring, or

## 2024-11-13 NOTE — CARE COORDINATION
Social work:    Possible transfer to snf depending on nephrology per a.m. rounds with Dr. Rowley. Fozia at [facility] advised social service that they received insurance approval.  Social work updated Dr. Rowley this afternoon.     Electronically signed by SIENNA Cloud on 11/13/2024 at 1:14 PM

## 2024-11-13 NOTE — PROGRESS NOTES
Occupational Therapy  OT BEDSIDE TREATMENT NOTE      Date:2024  Patient Name: Breanna Luz  MRN: [id number]  : 1931  Room: [address] [place] [address]     Evaluating OT: Magalis Call OTR/L   [id number]       Referring Provider:Jimena Rowley MD     Specific Provider Orders/Date:OT eval and treat 2024       Diagnosis:  Dehydration [E86.0]  Acute blood loss anemia [D62]  Demand ischemia (HCC) [I24.89]  Elevated troponin [R79.89]  Bilateral pleural effusion [J90]  HELEN (acute kidney injury) (HCC) [N17.9]  Anemia requiring transfusions [D64.9]  Gastrointestinal hemorrhage, unspecified gastrointestinal hemorrhage type [K92.2]     Pertinent Medical History: HTN, hip surgery  , anxiety     Precautions:  Fall Risk,       Assessment of current deficits    [x] Functional mobility            [x]ADLs           [x] Strength                  [x]Cognition    [x] Functional transfers          [x] IADLs         [x] Safety Awareness   [x]Endurance    [] Fine Coordination                         [x] Balance      [] Vision/perception   []Sensation      []Gross Motor Coordination             [] ROM           [] Delirium                   [] Motor Control      OT PLAN OF CARE   OT POC based on physician orders, patient diagnosis and results of clinical assessment     Frequency/Duration  2-4 days/wk for 2 - 4weeks PRN   Specific OT Treatment Interventions to include:   ADL retraining/adapted techniques and AE recommendations to increase functional independence within precautions                    Energy conservation techniques to improve tolerance for selfcare routine   Functional transfer/mobility training/DME recommendations for increased independence, safety and fall prevention         Patient/family education to increase safety and functional independence             Environmental modifications for safe mobility and completion of ADLs                             Therapeutic activity to improve functional performance during ADLs.      Visual/  Perceptual Glasses: none by bedsode           UE ROM/strength  AROM present throughout   fair functional use of UEs demonstrated Tolerate UE therapeutic activity/exercises to increase strength/endurance for ADL/xfer activity     Comments:  patient cleared with nursing and agreeable to session with strong encouragement. Very kind and pleasant but self limiting, refused changing into slipper socks and refused letting light into room. Education provided on importance of waking up during day and increasing OOB time. Patient in chair with call light in reach and alarm on    Education/treatment: ADL and functional transfer/activity performed to increase safety and independence during self care tasks. Education provided on safety awareness, adl reeducation, functional transfer training    Pt has made progress towards set goals.     Time In: 10:31  Time Out: 10:44     Min Units   Therapeutic Ex 22930     Therapeutic Activities 70331 13 1   ADL/Self Care 40535     Orthotic Management 12897     Neuro Re-Ed 21453     Non-Billable Time     TOTAL TIMED TREATMENT 13 1       Carissa GUARDADO/SANAM [id number]

## 2024-11-13 NOTE — CONSULTS
[facility]  Nephrology Consult Note  Patient's Name: Breanna Luz  10:56 AM  11/13/2024    Nephrologist: N/A    Reason for Consult:  hyponatremia  Requesting Physician:  Jimena Rowley MD    Chief Complaint:  shortness of breath    History Obtained From:  pt, chart    History of Present Illness:    Breanna Luz is a 89 y.o. female with past medical history of hypertension, hyperlipidemia, coronary artery disease, left hip fracture, she resides in a nursing home and she has protein calorie malnutrition.  She also has chronic kidney disease stage IIIa with baseline serum creatinine 1.1 to 1.5 mg/dL.  It was 1.4 mg/dL on April 30, 2024.  When she presented from the SNF her serum sodium was 128 mmol/L on 11/8.  Renal function has been roughly at baseline.  Nephrology consulted on hospital day 5 for hyponatremia with a serum sodium falling from 128 mmol/L down to 126 mmol/L earlier this morning.  She also has a mild metabolic acidosis.  No urine osmolarity had been done, she had a bland urinalysis at the time of presentation.  Her urine sodium last evening was less than 20.  She had a CTA of the chest on 11/8 which is a day of presentation which showed trace bilateral pleural effusions.  She has been saturating 90 to 93% on room air for the most part.  She has been found to been quite hypotensive over the last 24 to 48 hours.    Patient had a family member present at bedside.  IV fluids were ordered though I stopped them in order to properly evaluate her hyponatremia.  Patient looks weak, fatigued, diminished in strength but does not appear to have any acute illness.  She denies nausea or vomiting.  Does not appear significantly confused.  Beyond profound deconditioning there does not appear to be much affecting the patient.  She is being followed by the cardiology service, her aspirin and lisinopril are on hold.  She had a negative nuclear bleeding scan by GI.  Has been started on low-dose beta-blocker.    Past  Results   Component Value Date/Time    TROPONINI <0.01 05/23/2015 08:40 PM     U/A:    Lab Results   Component Value Date/Time    COLORU Yellow 11/09/2024 02:12 AM    PROTEINU NEGATIVE 11/09/2024 02:12 AM    PHUR 5.5 11/09/2024 02:12 AM    PHUR 6.0 04/16/2024 12:45 AM    WBCUA 0 TO 5 11/09/2024 02:12 AM    RBCUA 0 TO 2 11/09/2024 02:12 AM    CLARITYU Clear 09/29/2022 03:00 PM    LEUKOCYTESUR NEGATIVE 11/09/2024 02:12 AM    UROBILINOGEN 0.2 11/09/2024 02:12 AM    BILIRUBINUR NEGATIVE 11/09/2024 02:12 AM    BLOODU Negative 09/29/2022 03:00 PM    GLUCOSEU NEGATIVE 11/09/2024 02:12 AM     ABG:  No results found for: \"PH\", \"PCO2\", \"PO2\", \"HCO3\", \"BE\", \"THGB\", \"TCO2\", \"O2SAT\"  HgBA1c:    Lab Results   Component Value Date/Time    LABA1C 5.9 04/23/2024 06:01 AM     Microalbumen/Creatinine ratio:  No components found for: \"RUCREAT\"  FLP:    Lab Results   Component Value Date/Time    TRIG 118 04/23/2024 06:01 AM    HDL 57 04/23/2024 06:01 AM     TSH:    Lab Results   Component Value Date/Time    TSH 1.69 04/23/2024 06:01 AM     VITAMIN B12: No components found for: \"B12\"  FOLATE:    Lab Results   Component Value Date/Time    FOLATE 18.1 04/23/2024 06:01 AM     IRON:    Lab Results   Component Value Date/Time    IRON 235 11/09/2024 05:50 AM     Iron Saturation:  No components found for: \"PERCENTFE\"  TIBC:    Lab Results   Component Value Date/Time    TIBC 266 11/09/2024 05:50 AM     FERRITIN:    Lab Results   Component Value Date/Time    FERRITIN 543 11/09/2024 05:15 PM        Imaging:  Reviewed as able    Assessment/Plan:    1-hyponatremia  -Appears chronic in nature  -Urine sodium of less than 20 yesterday in the setting of elevated proBNP and mild pleural effusions on CTA of the chest may suggest a component of hypervolemic hyponatremia though her dry oral mucosa may suggest a hypovolemic state, she does not appear grossly overloaded; I would actually favor a euvolemic hyponatremia, possibly in the setting of SIADH as she

## 2024-11-13 NOTE — PROGRESS NOTES
Notified Jacklyn King NP regarding patient bladder scanned for 256 cc of urine. New orders obtained.

## 2024-11-13 NOTE — PROGRESS NOTES
Gastrointestinal hemorrhage with melena    Elevated troponin  Resolved Problems:    * No resolved hospital problems. *      Plan:  Acute blood loss anemia on DARREN- admit Hgb 6.8 improved 7.9 post transfusion, baseline in 4/2024 ~8.5, monitor and transfuse if less than 7  GI bleed- Hgb 6.8 on admit. Stool for occult blood positive in ED.  Consult GI, no plans for EGD/c-scope at this time, nuc med bleed scan negative 11/10. Continue PPI twice daily  Elevated troponin- likely demand ischemia from above, Trop trend 1000->828->946. Per ED note no ST depression seen on EKG. Cards following, no acute intervention. ECHO 11/11 EF 40-45% moderate global hypokinesis, severe MR, moderate pulm HTN  CHFrEF chronic- admit BNP 31,654. Cards following. ECHO 20112 EF 55-60%, repeat ECHO as above, start low dose BB, consider SGLT2i as outpt  Oliguric HELEN on Stage 3a CKD- admit Cr 1.5, b/l Cr difficult to discern but likely b/w 1.0-1.4 as per results from 4/2024. Monitor, FENa c/w pre-renal etiology (dehydration?)  Hyponatremia- 128 on admit, monitor. Likely multifactorial in setting of HELEN, poor PO intake and relative HoTN    -hypoNa now 126, urine Na <20, patient appears fairly euvolemic on exam however having limited UOP and still with poor intake, d/w Nephro difficult to determine volume status, consider IVF vs diuretics pending further urine indices    Dispo: anticipate SNF next 1-2d    NOTE: Portions of this report may have been transcribed using voice recognition software. Every effort was made to ensure accuracy; however, inadvertent computerized transcription errors may be present.  Electronically signed by Jimena Rowley MD on 11/13/2024 at 3:15 PM

## 2024-11-13 NOTE — PROGRESS NOTES
Spiritual Health History and Assessment/Progress Note  [facility]     Encounter, Rituals, Rites and Sacraments,  ,  ,      Name: Breanna Luz MRN: [id number]    Age: 89 y.o.     Sex: female   Language: English   Jain: Judaism   Acute blood loss anemia     Date: 11/12/2024                           Spiritual Assessment began in [address] INTERNAL MEDICINE 2        Referral/Consult From: Rounding   Encounter Overview/Reason:  Encounter, Rituals, Rites and Sacraments  Service Provided For: Patient    Sharon, Belief, Meaning:   Patient is connected with a sharon tradition or spiritual practice  Family/Friends are connected with a sharon tradition or spiritual practice      Importance and Influence:  Patient has no beliefs influential to healthcare decision-making identified during this visit  Family/Friends have no beliefs influential to healthcare decision-making identified during this visit    Community:  Patient feels well-supported. Support system includes: Children  Family/Friends feel well-supported. Support system includes: Other: Siblings    Assessment and Plan of Care:     Patient Interventions include: Provided sacramental/Church ritual  Family/Friends Interventions include: Affirmed coping skills/support systems    Patient Plan of Care: Spiritual Care available upon further referral  Family/Friends Plan of Care: Spiritual Care available upon further referral    Electronically signed by Chaplain Sammy on 11/12/2024 at 7:13 PM

## 2024-11-13 NOTE — PLAN OF CARE
Problem: Pain  Goal: Verbalizes/displays adequate comfort level or baseline comfort level  11/13/2024 0947 by Tiffany Lazo RN  Outcome: Progressing  11/12/2024 2217 by Fatimah Villalba RN  Outcome: Progressing     Problem: Safety - Adult  Goal: Free from fall injury  11/13/2024 0947 by Tiffany Lazo RN  Outcome: Progressing  11/12/2024 2217 by Fatimah Villalba RN  Outcome: Progressing     Problem: Confusion  Goal: Confusion, delirium, dementia, or psychosis is improved or at baseline  Description: INTERVENTIONS:  1. Assess for possible contributors to thought disturbance, including medications, impaired vision or hearing, underlying metabolic abnormalities, dehydration, psychiatric diagnoses, and notify attending LIP  2. [place] high risk fall precautions, as indicated  3. Provide frequent short contacts to provide reality reorientation, refocusing and direction  4. Decrease environmental stimuli, including noise as appropriate  5. Monitor and intervene to maintain adequate nutrition, hydration, elimination, sleep and activity  6. If unable to ensure safety without constant attention obtain sitter and review sitter guidelines with assigned personnel  7. Initiate Psychosocial CNS and Spiritual Care consult, as indicated  11/13/2024 0947 by Tiffany Lazo RN  Outcome: Progressing  11/12/2024 2217 by Fatimah Villalba RN  Outcome: Progressing

## 2024-11-13 NOTE — CARE COORDINATION
Social Work:    Advised by Dr. Rowley or possible transfer tomorrow rather than today due to sodium level.  Social work updated Fozia at [facility].     Electronically signed by SIENNA Cloud on 11/13/2024 at 1:20 PM

## 2024-11-13 NOTE — PROGRESS NOTES
PROGRESS NOTE        Patient Presents with/Seen in Consultation For      Reason for Consult: GIB     CHIEF COMPLAINT: Oxygen saturation in the 70s on room air    Subjective:     Patient seen laying in bed in no apparent distress.  No complaints of abdominal pain or nausea.  Tolerating diet.  Plan of care discussed with patient.    Review of Systems  Aside from what was mentioned in the PMH and HPI, essentially unremarkable, all others negative.    Objective:     /64   Pulse 96   Temp 97.8 °F (36.6 °C) (Oral)   Resp 18   Ht 1.575 m (5' 2\")   Wt 69.8 kg (153 lb 14.1 oz)   LMP  (LMP Unknown)   SpO2 94%   BMI 28.15 kg/m²     General appearance: alert, awake, laying in bed, and cooperative  Eyes: conjunctiva pale, sclera anicteric. PERRL.  Lungs: Clear to auscultation bilaterally  Heart: regular rate and rhythm, no murmur, 2+ pulses; trace edema  Abdomen: soft, non-tender; bowel sounds normal; no masses,  no organomegaly  Extremities: extremities trace edema  Pulses: 2+ and symmetric  Skin: Skin color, texture, turgor normal.   Neurologic: Grossly normal    metoprolol succinate (TOPROL XL) extended release tablet 12.5 mg, Daily  oxyCODONE-acetaminophen (PERCOCET) 5-325 MG per tablet 1 tablet, Daily PRN  oxyCODONE-acetaminophen (PERCOCET) 5-325 MG per tablet 1 tablet, BID  polyethylene glycol (GLYCOLAX) packet 17 g, Daily  LORazepam (ATIVAN) tablet 0.5 mg, Daily  sodium chloride flush 0.9 % injection 5-40 mL, 2 times per day  sodium chloride flush 0.9 % injection 5-40 mL, PRN  0.9 % sodium chloride infusion, PRN  ondansetron (ZOFRAN-ODT) disintegrating tablet 4 mg, Q8H PRN   Or  ondansetron (ZOFRAN) injection 4 mg, Q6H PRN  acetaminophen (TYLENOL) tablet 650 mg, Q6H PRN   Or  acetaminophen (TYLENOL) suppository 650 mg, Q6H PRN  pantoprazole (PROTONIX) 40 mg in sodium chloride (PF) 0.9 % 10 mL injection, Q12H  aspirin EC tablet 81 mg, Daily  busPIRone (BUSPAR) tablet 5 mg, BID  calcium carbonate (TUMS)  \"LABVLDL\"   PT/INR:    Lab Results   Component Value Date/Time    PROTIME 14.7 11/09/2024 05:50 AM    INR 1.4 11/09/2024 05:50 AM     IRON:    Lab Results   Component Value Date/Time    IRON 235 11/09/2024 05:50 AM     Iron Saturation:  No components found for: \"PERCENTFE\"  FERRITIN:    Lab Results   Component Value Date/Time    FERRITIN 543 11/09/2024 05:15 PM         Assessment:     Melena, occult +  Electrolyte abnormalities, defer  Anemia, normocytic  Transaminitis  Elevated troponin-defer  Stage III CKD  Anxiety- defer     Plan:     Bleeding scan without evidence of GI bleed  Regular diet  Continue stool softeners  Continue GlycoLax  No plans for endoscopies at this time, hemoglobin stable, no overt signs of bleeding reported.  Son currently refusing endoscopic workup.  Will follow as needed while patient remains inpatient      Note: This report was completed utilizing computer voice recognition software. Every effort has been made to ensure accuracy, however; inadvertent computerized transcription errors may be present.     Discussed with Dr. Mae  Plan per Dr. Carmelita CAMPOS-EDITH 11/13/2024  10:34 AM For Dr. Mae

## 2024-11-13 NOTE — PROGRESS NOTES
INPATIENT CARDIOLOGY FOLLOW-UP    Name: Breanna Luz    Age: 89 y.o.    Date of Admission: 11/8/2024  8:52 PM    Date of Service: 11/13/2024    Chief Complaint: Follow-up for elevated troponin and demand ischemia    Interim History:  No new overnight cardiac complaints.  Appears more calm today.  No acute distress.  Currently with no complaints of CP, SOB, palpitations, dizziness, or lightheadedness. SR on telemetry.    No new complaints.     Review of Systems:   Cardiac: As per HPI  General: No fever, chills  Pulmonary: As per HPI  HEENT: No visual disturbances, difficult swallowing  GI: No nausea, vomiting  Endocrine: No thyroid disease or DM  Musculoskeletal: ROBIN x 4, no focal motor deficits  Skin: Intact, no rashes  Neuro/Psych: No headache or seizures    Problem List:  Patient Active Problem List   Diagnosis    Primary hypertension    Hyperlipidemia    Coronary artery disease involving native coronary artery of native heart without angina pectoris    Protein calorie malnutrition (HCC)    Primary osteoarthritis involving multiple joints    Spondylolisthesis of lumbar region    Anxiety disorder    Unsteady gait    History of falling, presenting hazards to health    Constipation    Senile dementia without behavioral disturbance (HCC)    Allergic rhinitis    Insomnia    Stage 3a chronic kidney disease (HCC)    Iron deficiency anemia secondary to inadequate dietary iron intake    Acute blood loss anemia    Gastrointestinal hemorrhage with melena    Elevated troponin       Allergies:  No Known Allergies    Current Medications:  Current Facility-Administered Medications   Medication Dose Route Frequency Provider Last Rate Last Admin    metoprolol succinate (TOPROL XL) extended release tablet 12.5 mg  12.5 mg Oral Daily Sergio Baez MD   12.5 mg at 11/13/24 0758    oxyCODONE-acetaminophen (PERCOCET) 5-325 MG per tablet 1 tablet  1 tablet Oral Daily PRN Jimena Rowley MD        oxyCODONE-acetaminophen (PERCOCET)

## 2024-11-14 LAB
ANION GAP SERPL CALCULATED.3IONS-SCNC: 11 MMOL/L (ref 7–16)
BUN SERPL-MCNC: 30 MG/DL (ref 6–23)
CALCIUM SERPL-MCNC: 8.4 MG/DL (ref 8.6–10.2)
CHLORIDE SERPL-SCNC: 95 MMOL/L (ref 98–107)
CO2 SERPL-SCNC: 21 MMOL/L (ref 22–29)
CREAT SERPL-MCNC: 1.3 MG/DL (ref 0.5–1)
GFR, ESTIMATED: 40 ML/MIN/1.73M2
GLUCOSE SERPL-MCNC: 84 MG/DL (ref 74–99)
POTASSIUM SERPL-SCNC: 4 MMOL/L (ref 3.5–5)
SODIUM SERPL-SCNC: 127 MMOL/L (ref 132–146)

## 2024-11-14 PROCEDURE — 6370000000 HC RX 637 (ALT 250 FOR IP): Performed by: INTERNAL MEDICINE

## 2024-11-14 PROCEDURE — 97530 THERAPEUTIC ACTIVITIES: CPT

## 2024-11-14 PROCEDURE — 6370000000 HC RX 637 (ALT 250 FOR IP): Performed by: STUDENT IN AN ORGANIZED HEALTH CARE EDUCATION/TRAINING PROGRAM

## 2024-11-14 PROCEDURE — 6370000000 HC RX 637 (ALT 250 FOR IP): Performed by: NURSE PRACTITIONER

## 2024-11-14 PROCEDURE — 2060000000 HC ICU INTERMEDIATE R&B

## 2024-11-14 PROCEDURE — 99232 SBSQ HOSP IP/OBS MODERATE 35: CPT | Performed by: INTERNAL MEDICINE

## 2024-11-14 PROCEDURE — 80048 BASIC METABOLIC PNL TOTAL CA: CPT

## 2024-11-14 PROCEDURE — 2580000003 HC RX 258: Performed by: NURSE PRACTITIONER

## 2024-11-14 PROCEDURE — 6360000002 HC RX W HCPCS: Performed by: NURSE PRACTITIONER

## 2024-11-14 RX ORDER — SODIUM CHLORIDE 1 G/1
1 TABLET ORAL
Status: DISCONTINUED | OUTPATIENT
Start: 2024-11-14 | End: 2024-11-16 | Stop reason: HOSPADM

## 2024-11-14 RX ORDER — TAMSULOSIN HYDROCHLORIDE 0.4 MG/1
0.4 CAPSULE ORAL DAILY
Status: DISCONTINUED | OUTPATIENT
Start: 2024-11-14 | End: 2024-11-16 | Stop reason: HOSPADM

## 2024-11-14 RX ADMIN — CETIRIZINE HYDROCHLORIDE 5 MG: 10 TABLET, FILM COATED ORAL at 09:13

## 2024-11-14 RX ADMIN — ROPINIROLE HYDROCHLORIDE 2 MG: 2 TABLET, FILM COATED ORAL at 20:37

## 2024-11-14 RX ADMIN — DOCUSATE SODIUM 100 MG: 100 CAPSULE, LIQUID FILLED ORAL at 20:37

## 2024-11-14 RX ADMIN — SODIUM CHLORIDE, PRESERVATIVE FREE 40 MG: 5 INJECTION INTRAVENOUS at 09:13

## 2024-11-14 RX ADMIN — Medication 1 G: at 16:06

## 2024-11-14 RX ADMIN — Medication 3 MG: at 20:35

## 2024-11-14 RX ADMIN — SODIUM CHLORIDE, PRESERVATIVE FREE 10 ML: 5 INJECTION INTRAVENOUS at 09:13

## 2024-11-14 RX ADMIN — SERTRALINE HYDROCHLORIDE 50 MG: 50 TABLET ORAL at 09:13

## 2024-11-14 RX ADMIN — OXYCODONE HYDROCHLORIDE AND ACETAMINOPHEN 1 TABLET: 5; 325 TABLET ORAL at 20:35

## 2024-11-14 RX ADMIN — SODIUM CHLORIDE, PRESERVATIVE FREE 10 ML: 5 INJECTION INTRAVENOUS at 20:34

## 2024-11-14 RX ADMIN — METOPROLOL SUCCINATE 12.5 MG: 25 TABLET, EXTENDED RELEASE ORAL at 09:13

## 2024-11-14 RX ADMIN — LORAZEPAM 0.5 MG: 0.5 TABLET ORAL at 09:11

## 2024-11-14 RX ADMIN — DOCUSATE SODIUM 100 MG: 100 CAPSULE, LIQUID FILLED ORAL at 09:13

## 2024-11-14 RX ADMIN — TAMSULOSIN HYDROCHLORIDE 0.4 MG: 0.4 CAPSULE ORAL at 09:13

## 2024-11-14 RX ADMIN — Medication 1 G: at 11:30

## 2024-11-14 RX ADMIN — BUSPIRONE HYDROCHLORIDE 5 MG: 5 TABLET ORAL at 20:35

## 2024-11-14 RX ADMIN — BUSPIRONE HYDROCHLORIDE 5 MG: 5 TABLET ORAL at 09:13

## 2024-11-14 RX ADMIN — ASPIRIN 81 MG: 81 TABLET, COATED ORAL at 09:13

## 2024-11-14 RX ADMIN — POLYETHYLENE GLYCOL 3350 17 G: 17 POWDER, FOR SOLUTION ORAL at 09:14

## 2024-11-14 RX ADMIN — SODIUM CHLORIDE, PRESERVATIVE FREE 40 MG: 5 INJECTION INTRAVENOUS at 20:34

## 2024-11-14 ASSESSMENT — PAIN SCALES - GENERAL
PAINLEVEL_OUTOF10: 2
PAINLEVEL_OUTOF10: 0

## 2024-11-14 ASSESSMENT — PAIN SCALES - PAIN ASSESSMENT IN ADVANCED DEMENTIA (PAINAD)
FACIALEXPRESSION: SMILING OR INEXPRESSIVE
BODYLANGUAGE: RELAXED
CONSOLABILITY: NO NEED TO CONSOLE
BREATHING: NORMAL
BREATHING: NORMAL
TOTALSCORE: 0
BODYLANGUAGE: RELAXED
FACIALEXPRESSION: SMILING OR INEXPRESSIVE
CONSOLABILITY: NO NEED TO CONSOLE
TOTALSCORE: 0

## 2024-11-14 ASSESSMENT — PAIN DESCRIPTION - LOCATION: LOCATION: TOE (COMMENT WHICH ONE)

## 2024-11-14 ASSESSMENT — PAIN DESCRIPTION - ORIENTATION: ORIENTATION: RIGHT;LEFT

## 2024-11-14 ASSESSMENT — PAIN DESCRIPTION - DESCRIPTORS: DESCRIPTORS: NAGGING;OTHER (COMMENT)

## 2024-11-14 NOTE — CARE COORDINATION
Social Work:    Fozia at [facility] advised that insurance auth will  at midnight tonight and re-auth required thereafter.  Await urology consult to assist with treatment plan.  (N-17, ambulance, [place] completed)    Electronically signed by SIENNA Cloud on 2024 at 11:31 AM

## 2024-11-14 NOTE — PROGRESS NOTES
Dr. Fairchild made aware of son's concerns regarding discontinuing Zoloft since patient has been on it for years. Okay to resume.  Teresa Servin RN

## 2024-11-14 NOTE — PROGRESS NOTES
[facility]  Nephrology Progress Note  Patient's Name: Breanna Luz  3:41 PM  11/14/2024    Nephrologist: N/A    Reason for Consult:  hyponatremia  Requesting Physician:  Fam Fairchild MD    Chief Complaint:  shortness of breath    History Obtained From:  pt, chart    History of Present Illness:    Breanna Luz is a 89 y.o. female with past medical history of hypertension, hyperlipidemia, coronary artery disease, left hip fracture, she resides in a nursing home and she has protein calorie malnutrition.  She also has chronic kidney disease stage IIIa with baseline serum creatinine 1.1 to 1.5 mg/dL.  It was 1.4 mg/dL on April 30, 2024.  When she presented from the SNF her serum sodium was 128 mmol/L on 11/8.  Renal function has been roughly at baseline.  Nephrology consulted on hospital day 5 for hyponatremia with a serum sodium falling from 128 mmol/L down to 126 mmol/L earlier yesterday morning.  She also has a mild metabolic acidosis.  No urine osmolarity had been done, she had a bland urinalysis at the time of presentation.  Her urine sodium initially was less than 20.  She had a CTA of the chest on 11/8 which is a day of presentation which showed trace bilateral pleural effusions.  She has been saturating 90 to 93% on room air for the most part.  She has been found to been quite hypotensive over the last 24 to 48 hours.    Interval History:    11/14: Patient seen and examined.  Making good urine output.  Still weak and fatigued.  Eating lunch.  Still having fairly meager appetite.  Having the broth from her soup though seems reluctant to consume a higher protein diet.  Labs reviewed with patient.      Past Medical History:   Diagnosis Date    Anemia due to acute blood loss 5/28/2015    C2 cervical fracture (HCC)     CAD (coronary artery disease) 5/26/2015    Fracture of left hip (HCC) 5/26/2015    HTN (hypertension) 11/7/2012    Hyperlipidemia     Hyperlipidemia 11/7/2012    Hypertension

## 2024-11-14 NOTE — PROGRESS NOTES
Occupational Therapy  OT BEDSIDE TREATMENT NOTE      Date:2024  Patient Name: Breanna Luz  MRN: [id number]  : 1931  Room: [address] [place] [address]     Evaluating OT: Magalis Call OTR/L   [id number]       Referring Provider:Jimena Rowley MD     Specific Provider Orders/Date:OT eval and treat 2024       Diagnosis:  Dehydration [E86.0]  Acute blood loss anemia [D62]  Demand ischemia (HCC) [I24.89]  Elevated troponin [R79.89]  Bilateral pleural effusion [J90]  EHLEN (acute kidney injury) (HCC) [N17.9]  Anemia requiring transfusions [D64.9]  Gastrointestinal hemorrhage, unspecified gastrointestinal hemorrhage type [K92.2]     Pertinent Medical History: HTN, hip surgery  , anxiety     Precautions:  Fall Risk,       Assessment of current deficits    [x] Functional mobility            [x]ADLs           [x] Strength                  [x]Cognition    [x] Functional transfers          [x] IADLs         [x] Safety Awareness   [x]Endurance    [] Fine Coordination                         [x] Balance      [] Vision/perception   []Sensation      []Gross Motor Coordination             [] ROM           [] Delirium                   [] Motor Control      OT PLAN OF CARE   OT POC based on physician orders, patient diagnosis and results of clinical assessment     Frequency/Duration  2-4 days/wk for 2 - 4weeks PRN   Specific OT Treatment Interventions to include:   ADL retraining/adapted techniques and AE recommendations to increase functional independence within precautions                    Energy conservation techniques to improve tolerance for selfcare routine   Functional transfer/mobility training/DME recommendations for increased independence, safety and fall prevention         Patient/family education to increase safety and functional independence             Environmental modifications for safe mobility and completion of ADLs                             Therapeutic activity to improve functional performance during ADLs.                                          Therapeutic exercise to improve tolerance and functional strength for ADLs    Balance retraining/tolerance tasks for facilitation of postural control with dynamic challenges during ADLs .      Positioning to improve functional independence        Recommended Adaptive Equipment: TBD      Home Living: Pt lives at [facility] , ambulates with walker.  ADLs ??      Pain Level: beckett discomfort, nursing aware  Cognition: A&O following simple commands, much encouragement to participate              Memory:  fair               Sequencing:  fair               Problem solving:  fair               Judgement/safety:  fair                 Functional Assessment:  AM-PAC Daily Activity Raw Score: 15/24    Initial Eval Status  Date: 11/12/2024 Treatment Status  Date:11/14/24 STGs = LTGs  Time frame: 10-14 days   Feeding Set-up   Independent   Grooming Min A, seated  Decrease standing tolerance   Supervision    UB Dressing Min A  min A Supervision    LB Dressing Max A  Assist with donning socks   max A Min A    Bathing Mod A    Min A    Toileting Assist with thorough hygiene  mod A for thorough hygiene after use of BSC, now has beckett Supervision    Bed Mobility  Min A  Supine to sit  Supine to sit mod A  SBA/supervision    Functional Transfers Min/CGA, w/walker   Sit- stand from bed, chair   Tendency to plop down when sitting   Cued on hand placement Sit <> stand min A  SBA/supervision    Functional Mobility Min A, w/walker   Short distance in room  Min A SPT from bed to BSC and from BSC to chair with and without device min A SBA/supervision  with good tolerance    Balance Sitting:     Static:  SBA- EOB     Dynamic:Min A   Standing: Min A  Sitting balance SBA  Standing balance min a  Independent/supervision    Activity Tolerance No SOB observed  Patient self limiting   Anxious   Stating over \" how am I going to recover from this\" , \"I can't do this \" fair, still anxious requiring max encouragement for

## 2024-11-14 NOTE — PROGRESS NOTES
[facility] Hospitalist Progress Note    Admitting Date and Time: 11/8/2024  8:52 PM  Admit Dx: Dehydration [E86.0]  Acute blood loss anemia [D62]  Demand ischemia (HCC) [I24.89]  Elevated troponin [R79.89]  Bilateral pleural effusion [J90]  HELEN (acute kidney injury) (HCC) [N17.9]  Anemia requiring transfusions [D64.9]  Gastrointestinal hemorrhage, unspecified gastrointestinal hemorrhage type [K92.2]    Subjective:  Patient is being followed for Dehydration [E86.0]  Acute blood loss anemia [D62]  Demand ischemia (HCC) [I24.89]  Elevated troponin [R79.89]  Bilateral pleural effusion [J90]  HELEN (acute kidney injury) (HCC) [N17.9]  Anemia requiring transfusions [D64.9]  Gastrointestinal hemorrhage, unspecified gastrointestinal hemorrhage type [K92.2]       Pt has no acute complaints.     ROS: denies fever, chills, cp, sob, n/v, HA unless stated above.      metoprolol succinate  12.5 mg Oral Daily    oxyCODONE-acetaminophen  1 tablet Oral BID    polyethylene glycol  17 g Oral Daily    LORazepam  0.5 mg Oral Daily    sodium chloride flush  5-40 mL IntraVENous 2 times per day    pantoprazole (PROTONIX) 40 mg in sodium chloride (PF) 0.9 % 10 mL injection  40 mg IntraVENous Q12H    aspirin  81 mg Oral Daily    busPIRone  5 mg Oral BID    docusate sodium  100 mg Oral BID    [Held by provider] lisinopril  10 mg Oral BID    cetirizine  5 mg Oral Daily    melatonin  3 mg Oral Nightly    rOPINIRole  2 mg Oral Nightly    sertraline  50 mg Oral Daily     oxyCODONE-acetaminophen, 1 tablet, Daily PRN  sodium chloride flush, 5-40 mL, PRN  sodium chloride, , PRN  ondansetron, 4 mg, Q8H PRN   Or  ondansetron, 4 mg, Q6H PRN  acetaminophen, 650 mg, Q6H PRN   Or  acetaminophen, 650 mg, Q6H PRN  calcium carbonate, 1 tablet, TID PRN  Camphor-Menthol-Methyl Sal, 1 patch, Daily PRN  meclizine, 25 mg, 4x Daily PRN  perflutren lipid microspheres, 1.5 mL, ONCE PRN  sodium chloride, , PRN         Objective:    BP (!) 115/57   Pulse 90

## 2024-11-14 NOTE — PROGRESS NOTES
Physical Therapy  Facility/Department: [address] INTERNAL MEDICINE 2  Physical Therapy Treatment Note    Name: Breanna Luz  : 1931  MRN: [id number]  Date of Service: 2024        Patient Diagnosis(es): The primary encounter diagnosis was Acute blood loss anemia. Diagnoses of Anemia requiring transfusions, Gastrointestinal hemorrhage, unspecified gastrointestinal hemorrhage type, Elevated troponin, Demand ischemia (HCC), HELEN (acute kidney injury) (HCC), Dehydration, and Bilateral pleural effusion were also pertinent to this visit.  Past Medical History:  has a past medical history of Anemia due to acute blood loss, C2 cervical fracture (HCC), CAD (coronary artery disease), Fracture of left hip (HCC), HTN (hypertension), Hyperlipidemia, Hyperlipidemia, Hypertension, and Protein calorie malnutrition (HCC).  Past Surgical History:  has a past surgical history that includes angioplasty; ECHO Compl W Dop Color Flow (2012); and Hip fracture surgery (Left, 5/25/15).    Evaluating Therapist: Paradise Reddy PT    Room #:  0419/0419-A  Diagnosis:  Dehydration [E86.0]  Acute blood loss anemia [D62]  Demand ischemia (HCC) [I24.89]  Elevated troponin [R79.89]  Bilateral pleural effusion [J90]  HELEN (acute kidney injury) (HCC) [N17.9]  Anemia requiring transfusions [D64.9]  Gastrointestinal hemorrhage, unspecified gastrointestinal hemorrhage type [K92.2]  PMHx/PSHx:  Anemia, HTN, CAD  Precautions:  falls, alarm    Social:  Pt admitted from assisted. Ambulates with ww   Initial Evaluation  Date: 24 Treatment  2024    Short Term/ Long Term   Goals   Was pt agreeable to Eval/treatment? With encouragement With encouragement    Does pt have pain? None reported Back pain being in bed    Bed Mobility  Rolling: min assist  Supine to sit: min assist  Sit to supine: NT  Scooting: min assist Rolling: Min A  Supine to sit: Mod A  Scooting: Min A seated to EOB independent   Transfers Sit to stand: min assist  Stand to

## 2024-11-14 NOTE — CONSULTS
2024 3:07 PM  Breanna Luz  [id number]     Chief Complaint:    Urinary retention      History of Present Illness:      The patient is a 89 y.o. female patient who presented to the hospital with complaints of shortness of breath, weakness, dizziness, and chest pain. She was admitted for dehydration, anemia, elevated troponin, and GI bleed.     She is a poor historian. Son at bedside and answers most of her questions. Urology is asked to evaluate for urinary retention. She had a beckett inserted yesterday after she was bladder scanned for 249ml. She was straight cathed prior to this for 300ml and 400ml. She is not ambulating currently.       Past Medical History:   Diagnosis Date    Anemia due to acute blood loss 2015    C2 cervical fracture (HCC)     CAD (coronary artery disease) 2015    Fracture of left hip ([facility]) 2015    HTN (hypertension) 2012    Hyperlipidemia     Hyperlipidemia 2012    Hypertension     Protein calorie malnutrition (HCC) 2015         Past Surgical History:   Procedure Laterality Date    ANGIOPLASTY      ECHO COMPL W DOP COLOR FLOW  2012         HIP FRACTURE SURGERY Left 5/25/15       Medications Prior to Admission:    Medications Prior to Admission: [] amoxicillin (AMOXIL) 500 MG capsule, Take 1 capsule by mouth 2 times daily Indications: Toothache  LORazepam (ATIVAN) 0.5 MG tablet, Take 1 tablet by mouth Daily. Max Daily Amount: 0.5 mg  polycarbophil (FIBERCON) 625 MG tablet, Take 1 tablet by mouth nightly  ferrous sulfate (IRON 325) 325 (65 Fe) MG tablet, Take 1 tablet by mouth daily (with breakfast)  HYDROcodone-acetaminophen (NORCO) 5-325 MG per tablet, Take 1 tablet by mouth in the morning and at bedtime. Max Daily Amount: 2 tablets  mirtazapine (REMERON) 15 MG tablet, Take 0.5 tablets by mouth nightly  loratadine (CLARITIN) 10 MG tablet, TAKE ONE(1) TABLET BY MOUTH ONCE DAILY IN THE MORNING. (Patient taking differently: Take 1 tablet by mouth  in the morning and at bedtime TAKE ONE(1) TABLET BY MOUTH ONCE DAILY IN THE MORNING.)  ASPIRIN LOW DOSE 81 MG EC tablet, TAKE 1 TABLET BY MOUTH ONCE DAILY  busPIRone (BUSPAR) 5 MG tablet, Take 1 tablet by mouth 2 times daily (Patient taking differently: Take 3 tablets by mouth 2 times daily)  melatonin 3 MG TABS tablet, Take 1 tablet by mouth nightly (Patient taking differently: Take 10 mg by mouth nightly)  polyethylene glycol (GLYCOLAX) 17 GM/SCOOP powder, DISSOLVE 17GRAM (1 CAPFUL) IN 4 TO 8OZ OF BEVERAGE AND TAKE BY MOUTH DAILY AS NEEDED (Patient taking differently: Take 17 g by mouth Twice a Week Monday and thursday)  dilTIAZem (CARDIZEM CD) 120 MG extended release capsule, TAKE 1 CAPSULE BY MOUTH DAILY AT BEDTIME  docusate sodium (COLACE) 100 MG capsule, Take 1 capsule by mouth 2 times daily  trolamine salicylate (ASPERCREME) 10 % cream, Apply 1 g topically 2 times daily as needed for Pain Apply topically as needed.  HYDROcodone-acetaminophen (NORCO) 5-325 MG per tablet, Take 1 tablet by mouth daily as needed for Pain. Max Daily Amount: 1 tablet  benzocaine (ORAJEL) 10 % mucosal gel, Take 7 g by mouth as needed for Pain Take by mouth as needed.  [DISCONTINUED] rOPINIRole (REQUIP) 0.5 MG tablet, TAKE 1 TABLET BY MOUTH DAILY AT BEDTIME. TAKE ALONG W/2MG=2.5MG  BISACODYL 5 MG EC tablet, TAKE 2 TABLETS BY MOUTH DAILY AS NEEDED **DO NOT CRUSH**  [DISCONTINUED] aspirin (ASPIRIN LOW DOSE) 81 MG EC tablet, Take 1 tablet by mouth daily  [DISCONTINUED] sertraline (ZOLOFT) 50 MG tablet, Take 1 tablet by mouth daily  [DISCONTINUED] lisinopril (PRINIVIL;ZESTRIL) 10 MG tablet, TAKE 1 TABLET BY MOUTH TWICE DAILY  phenylephrine-mineral oil-petrolatum (PREPARATION H) 0.25-14-74.9 % rectal ointment, Place 1 Applicatorful rectally 3 times daily as needed for Hemorrhoids  [DISCONTINUED] Oxymetazoline HCl (AFRIN NODRIP ORIGINAL NA), 1 spray by Nasal route 2 times daily as needed (nasal congestion)  [DISCONTINUED] vitamin C

## 2024-11-14 NOTE — PLAN OF CARE
Problem: Discharge Planning  Goal: Discharge to home or other facility with appropriate resources  Outcome: Not Progressing     Problem: Pain  Goal: Verbalizes/displays adequate comfort level or baseline comfort level  Outcome: Not Progressing     Problem: Safety - Adult  Goal: Free from fall injury  Outcome: Not Progressing     Problem: Confusion  Goal: Confusion, delirium, dementia, or psychosis is improved or at baseline  Description: INTERVENTIONS:  1. Assess for possible contributors to thought disturbance, including medications, impaired vision or hearing, underlying metabolic abnormalities, dehydration, psychiatric diagnoses, and notify attending LIP  2. [place] high risk fall precautions, as indicated  3. Provide frequent short contacts to provide reality reorientation, refocusing and direction  4. Decrease environmental stimuli, including noise as appropriate  5. Monitor and intervene to maintain adequate nutrition, hydration, elimination, sleep and activity  6. If unable to ensure safety without constant attention obtain sitter and review sitter guidelines with assigned personnel  7. Initiate Psychosocial CNS and Spiritual Care consult, as indicated  Outcome: Not Progressing     Problem: ABCDS Injury Assessment  Goal: Absence of physical injury  Outcome: Not Progressing     Problem: Skin/Tissue Integrity  Goal: Absence of new skin breakdown  Description: 1.  Monitor for areas of redness and/or skin breakdown  2.  Assess vascular access sites hourly  3.  Every 4-6 hours minimum:  Change oxygen saturation probe site  4.  Every 4-6 hours:  If on nasal continuous positive airway pressure, respiratory therapy assess nares and determine need for appliance change or resting period.  Outcome: Not Progressing     Problem: Nutrition Deficit:  Goal: Optimize nutritional status  Outcome: Not Progressing     Problem: Hematologic - Adult  Goal: Maintains hematologic stability  Outcome: Not Progressing

## 2024-11-14 NOTE — PLAN OF CARE
Problem: Discharge Planning  Goal: Discharge to home or other facility with appropriate resources  11/14/2024 1040 by Teresa Servin RN  Outcome: Progressing  11/13/2024 2356 by Jazmyn Mast RN  Outcome: Not Progressing     Problem: Pain  Goal: Verbalizes/displays adequate comfort level or baseline comfort level  11/14/2024 1040 by Teresa Servin RN  Outcome: Progressing  11/13/2024 2356 by Jazmyn Mast RN  Outcome: Not Progressing     Problem: Safety - Adult  Goal: Free from fall injury  11/14/2024 1040 by Teresa Servin RN  Outcome: Progressing  11/13/2024 2356 by Jazmyn Mast RN  Outcome: Not Progressing     Problem: Confusion  Goal: Confusion, delirium, dementia, or psychosis is improved or at baseline  Description: INTERVENTIONS:  1. Assess for possible contributors to thought disturbance, including medications, impaired vision or hearing, underlying metabolic abnormalities, dehydration, psychiatric diagnoses, and notify attending LIP  2. [place] high risk fall precautions, as indicated  3. Provide frequent short contacts to provide reality reorientation, refocusing and direction  4. Decrease environmental stimuli, including noise as appropriate  5. Monitor and intervene to maintain adequate nutrition, hydration, elimination, sleep and activity  6. If unable to ensure safety without constant attention obtain sitter and review sitter guidelines with assigned personnel  7. Initiate Psychosocial CNS and Spiritual Care consult, as indicated  11/14/2024 1040 by Teresa Servin RN  Outcome: Progressing  11/13/2024 2356 by Jazmyn Mast RN  Outcome: Not Progressing     Problem: ABCDS Injury Assessment  Goal: Absence of physical injury  11/14/2024 1040 by Teresa Servin RN  Outcome: Progressing  11/13/2024 2356 by Jazmyn Mast RN  Outcome: Not Progressing     Problem: Skin/Tissue Integrity  Goal: Absence of new skin breakdown  Description: 1.  Monitor for areas of

## 2024-11-14 NOTE — PROGRESS NOTES
Patient continues to refuse SCDs. Education given on the importance and reason why we use them. Patient still continues to refuse them. Will continue to monitor.  Teresa Servin RN

## 2024-11-15 VITALS
RESPIRATION RATE: 18 BRPM | SYSTOLIC BLOOD PRESSURE: 108 MMHG | WEIGHT: 153.88 LBS | OXYGEN SATURATION: 96 % | BODY MASS INDEX: 28.32 KG/M2 | HEIGHT: 62 IN | TEMPERATURE: 98.2 F | DIASTOLIC BLOOD PRESSURE: 55 MMHG | HEART RATE: 93 BPM

## 2024-11-15 PROBLEM — K92.1 GASTROINTESTINAL HEMORRHAGE WITH MELENA: Status: RESOLVED | Noted: 2024-11-09 | Resolved: 2024-11-15

## 2024-11-15 PROBLEM — R33.9 URINARY RETENTION: Status: ACTIVE | Noted: 2024-11-15

## 2024-11-15 PROBLEM — D62 ACUTE BLOOD LOSS ANEMIA: Status: RESOLVED | Noted: 2024-11-09 | Resolved: 2024-11-15

## 2024-11-15 PROBLEM — R79.89 ELEVATED TROPONIN: Status: RESOLVED | Noted: 2024-11-09 | Resolved: 2024-11-15

## 2024-11-15 LAB
ANION GAP SERPL CALCULATED.3IONS-SCNC: 10 MMOL/L (ref 7–16)
BASOPHILS # BLD: 0.03 K/UL (ref 0–0.2)
BASOPHILS NFR BLD: 0 % (ref 0–2)
BUN SERPL-MCNC: 22 MG/DL (ref 6–23)
CALCIUM SERPL-MCNC: 8.4 MG/DL (ref 8.6–10.2)
CHLORIDE SERPL-SCNC: 98 MMOL/L (ref 98–107)
CO2 SERPL-SCNC: 23 MMOL/L (ref 22–29)
CREAT SERPL-MCNC: 1.1 MG/DL (ref 0.5–1)
EOSINOPHIL # BLD: 0.05 K/UL (ref 0.05–0.5)
EOSINOPHILS RELATIVE PERCENT: 1 % (ref 0–6)
ERYTHROCYTE [DISTWIDTH] IN BLOOD BY AUTOMATED COUNT: 18.6 % (ref 11.5–15)
GFR, ESTIMATED: 47 ML/MIN/1.73M2
GLUCOSE SERPL-MCNC: 103 MG/DL (ref 74–99)
HCT VFR BLD AUTO: 24 % (ref 34–48)
HGB BLD-MCNC: 7.6 G/DL (ref 11.5–15.5)
IMM GRANULOCYTES # BLD AUTO: 0.19 K/UL (ref 0–0.58)
IMM GRANULOCYTES NFR BLD: 3 % (ref 0–5)
LYMPHOCYTES NFR BLD: 0.93 K/UL (ref 1.5–4)
LYMPHOCYTES RELATIVE PERCENT: 14 % (ref 20–42)
MCH RBC QN AUTO: 29.7 PG (ref 26–35)
MCHC RBC AUTO-ENTMCNC: 31.7 G/DL (ref 32–34.5)
MCV RBC AUTO: 93.8 FL (ref 80–99.9)
MONOCYTES NFR BLD: 1.03 K/UL (ref 0.1–0.95)
MONOCYTES NFR BLD: 15 % (ref 2–12)
NEUTROPHILS NFR BLD: 68 % (ref 43–80)
NEUTS SEG NFR BLD: 4.66 K/UL (ref 1.8–7.3)
PLATELET CONFIRMATION: NORMAL
PLATELET, FLUORESCENCE: 34 K/UL (ref 130–450)
PMV BLD AUTO: ABNORMAL FL (ref 7–12)
POTASSIUM SERPL-SCNC: 3.9 MMOL/L (ref 3.5–5)
RBC # BLD AUTO: 2.56 M/UL (ref 3.5–5.5)
RBC # BLD: ABNORMAL 10*6/UL
SODIUM SERPL-SCNC: 131 MMOL/L (ref 132–146)
WBC OTHER # BLD: 6.9 K/UL (ref 4.5–11.5)

## 2024-11-15 PROCEDURE — 6370000000 HC RX 637 (ALT 250 FOR IP): Performed by: INTERNAL MEDICINE

## 2024-11-15 PROCEDURE — 6370000000 HC RX 637 (ALT 250 FOR IP): Performed by: NURSE PRACTITIONER

## 2024-11-15 PROCEDURE — 51798 US URINE CAPACITY MEASURE: CPT

## 2024-11-15 PROCEDURE — 80048 BASIC METABOLIC PNL TOTAL CA: CPT

## 2024-11-15 PROCEDURE — 36415 COLL VENOUS BLD VENIPUNCTURE: CPT

## 2024-11-15 PROCEDURE — 6370000000 HC RX 637 (ALT 250 FOR IP): Performed by: STUDENT IN AN ORGANIZED HEALTH CARE EDUCATION/TRAINING PROGRAM

## 2024-11-15 PROCEDURE — 85025 COMPLETE CBC W/AUTO DIFF WBC: CPT

## 2024-11-15 PROCEDURE — 6360000002 HC RX W HCPCS: Performed by: NURSE PRACTITIONER

## 2024-11-15 PROCEDURE — 99232 SBSQ HOSP IP/OBS MODERATE 35: CPT | Performed by: INTERNAL MEDICINE

## 2024-11-15 PROCEDURE — 2580000003 HC RX 258: Performed by: NURSE PRACTITIONER

## 2024-11-15 RX ORDER — ZINC GLUCONATE 2 [HP_X]/1
1 LOZENGE ORAL
Qty: 84 LOZENGE | Refills: 3 | Status: SHIPPED | OUTPATIENT
Start: 2024-11-15

## 2024-11-15 RX ORDER — ROPINIROLE 2 MG/1
2 TABLET, FILM COATED ORAL NIGHTLY
Qty: 90 TABLET | Refills: 3 | Status: SHIPPED | OUTPATIENT
Start: 2024-11-15

## 2024-11-15 RX ORDER — IPRATROPIUM BROMIDE 21 UG/1
2 SPRAY, METERED NASAL 3 TIMES DAILY
Qty: 30 ML | Refills: 5 | Status: SHIPPED | OUTPATIENT
Start: 2024-11-15

## 2024-11-15 RX ORDER — ASCORBIC ACID 500 MG
500 TABLET ORAL
Qty: 30 TABLET | Refills: 0 | Status: SHIPPED | OUTPATIENT
Start: 2024-11-15 | End: 2024-12-15

## 2024-11-15 RX ORDER — SODIUM CHLORIDE 1 G/1
1 TABLET ORAL
Qty: 90 TABLET | Refills: 3 | Status: SHIPPED | OUTPATIENT
Start: 2024-11-15

## 2024-11-15 RX ORDER — ALLOPURINOL 100 MG/1
100 TABLET ORAL DAILY
DISCHARGE
Start: 2024-11-15

## 2024-11-15 RX ORDER — ALLOPURINOL 100 MG/1
100 TABLET ORAL DAILY
Status: DISCONTINUED | OUTPATIENT
Start: 2024-11-15 | End: 2024-11-16 | Stop reason: HOSPADM

## 2024-11-15 RX ADMIN — DOCUSATE SODIUM 100 MG: 100 CAPSULE, LIQUID FILLED ORAL at 20:38

## 2024-11-15 RX ADMIN — METOPROLOL SUCCINATE 12.5 MG: 25 TABLET, EXTENDED RELEASE ORAL at 08:08

## 2024-11-15 RX ADMIN — ROPINIROLE HYDROCHLORIDE 2 MG: 2 TABLET, FILM COATED ORAL at 20:39

## 2024-11-15 RX ADMIN — SERTRALINE HYDROCHLORIDE 50 MG: 50 TABLET ORAL at 08:08

## 2024-11-15 RX ADMIN — OXYCODONE HYDROCHLORIDE AND ACETAMINOPHEN 1 TABLET: 5; 325 TABLET ORAL at 20:36

## 2024-11-15 RX ADMIN — BUSPIRONE HYDROCHLORIDE 5 MG: 5 TABLET ORAL at 08:08

## 2024-11-15 RX ADMIN — LORAZEPAM 0.5 MG: 0.5 TABLET ORAL at 08:08

## 2024-11-15 RX ADMIN — Medication 3 MG: at 20:38

## 2024-11-15 RX ADMIN — Medication 1 G: at 08:08

## 2024-11-15 RX ADMIN — BUSPIRONE HYDROCHLORIDE 5 MG: 5 TABLET ORAL at 20:38

## 2024-11-15 RX ADMIN — CETIRIZINE HYDROCHLORIDE 5 MG: 10 TABLET, FILM COATED ORAL at 08:08

## 2024-11-15 RX ADMIN — SODIUM CHLORIDE, PRESERVATIVE FREE 40 MG: 5 INJECTION INTRAVENOUS at 20:39

## 2024-11-15 RX ADMIN — SODIUM CHLORIDE, PRESERVATIVE FREE 40 MG: 5 INJECTION INTRAVENOUS at 08:09

## 2024-11-15 RX ADMIN — DOCUSATE SODIUM 100 MG: 100 CAPSULE, LIQUID FILLED ORAL at 08:08

## 2024-11-15 RX ADMIN — ASPIRIN 81 MG: 81 TABLET, COATED ORAL at 08:08

## 2024-11-15 RX ADMIN — Medication 1 G: at 16:50

## 2024-11-15 RX ADMIN — TAMSULOSIN HYDROCHLORIDE 0.4 MG: 0.4 CAPSULE ORAL at 08:08

## 2024-11-15 RX ADMIN — SODIUM CHLORIDE, PRESERVATIVE FREE 10 ML: 5 INJECTION INTRAVENOUS at 20:39

## 2024-11-15 RX ADMIN — POLYETHYLENE GLYCOL 3350 17 G: 17 POWDER, FOR SOLUTION ORAL at 08:08

## 2024-11-15 RX ADMIN — ALLOPURINOL 100 MG: 100 TABLET ORAL at 16:50

## 2024-11-15 RX ADMIN — Medication 1 G: at 10:54

## 2024-11-15 RX ADMIN — SODIUM CHLORIDE, PRESERVATIVE FREE 10 ML: 5 INJECTION INTRAVENOUS at 08:09

## 2024-11-15 RX ADMIN — OXYCODONE HYDROCHLORIDE AND ACETAMINOPHEN 1 TABLET: 5; 325 TABLET ORAL at 08:08

## 2024-11-15 ASSESSMENT — PAIN - FUNCTIONAL ASSESSMENT: PAIN_FUNCTIONAL_ASSESSMENT: ACTIVITIES ARE NOT PREVENTED

## 2024-11-15 ASSESSMENT — PAIN SCALES - GENERAL: PAINLEVEL_OUTOF10: 8

## 2024-11-15 ASSESSMENT — PAIN DESCRIPTION - DESCRIPTORS: DESCRIPTORS: THROBBING

## 2024-11-15 ASSESSMENT — PAIN DESCRIPTION - LOCATION: LOCATION: NECK

## 2024-11-15 NOTE — DISCHARGE INSTRUCTIONS
Call upon discharge to schedule a follow-up visit with Matt Abraham/Gene/Rayne ([facility]) at [phone]

## 2024-11-15 NOTE — PROGRESS NOTES
[facility]  Nephrology Progress Note  Patient's Name: Breanna Luz  4:37 PM  11/15/2024    Nephrologist: N/A    Reason for Consult:  hyponatremia  Requesting Physician:  Fam Fairchild MD    Chief Complaint:  shortness of breath    History Obtained From:  pt, chart    History of Present Illness:    Breanna Luz is a 89 y.o. female with past medical history of hypertension, hyperlipidemia, coronary artery disease, left hip fracture, she resides in a nursing home and she has protein calorie malnutrition.  She also has chronic kidney disease stage IIIa with baseline serum creatinine 1.1 to 1.5 mg/dL.  It was 1.4 mg/dL on April 30, 2024.  When she presented from the SNF her serum sodium was 128 mmol/L on 11/8.  Renal function has been roughly at baseline.  Nephrology consulted on hospital day 5 for hyponatremia with a serum sodium falling from 128 mmol/L down to 126 mmol/L earlier yesterday morning.  She also has a mild metabolic acidosis.  No urine osmolarity had been done, she had a bland urinalysis at the time of presentation.  Her urine sodium initially was less than 20.  She had a CTA of the chest on 11/8 which is a day of presentation which showed trace bilateral pleural effusions.  She has been saturating 90 to 93% on room air for the most part.  She has been found to been quite hypotensive over the last 24 to 48 hours.    Interval History:    11/14: Patient seen and examined.  Making good urine output.  Still weak and fatigued.  Eating lunch.  Still having fairly meager appetite.  Having the broth from her soup though seems reluctant to consume a higher protein diet.  Labs reviewed with patient.    11/15: Patient seen and examined.  Serum sodium beginning to improve.  Making acceptable amount of urine output.  She is still somewhat irritable.  Does not particularly like being examined.  Blood pressures have been within normal range.  Getting bladder scanned though does not have any signs of

## 2024-11-15 NOTE — PROGRESS NOTES
Physician Progress Note      PATIENT:               KAVON   CSN #:                  [id number]  :                       1931  ADMIT DATE:       2024 8:52 PM  DISCH DATE:  RESPONDING  PROVIDER #:        Merritt Harvey MD          QUERY TEXT:    Patient admitted with melena and hyponatremia. Noted documentation of possible   SIADH in renal consult note. In order to support the diagnosis of SIADH,   please include additional clinical indicators in your documentation.  Or   please document if the diagnosis of SIADH has been ruled out after further   study.    The medical record reflects the following:  Risk Factors: urinary retention, on Zoloft, hypovolemia  Clinical Indicators: Consult \"Hyponatremia-appears chronic in nature...her dry   oral mucosa may suggest a hypovolemic state, she does not appear grossly   overloaded; I would actually favor a euvolemic hyponatremia, possibly in the   setting of SIADH as she is on Zoloft though we will need to obtain an updated   urine sodium and urine osmolality...There may be some component of urinary   retention which contrast transient hyponatremia, Dowell catheter being placed.\"   renal PN \"Urine sodium less than 20, urine osmolality 6, patient has a   quite dilute urine. She has essentially what appears to be no protein   intake/solute intake and this is what is likely driving at least in part the   hyponatremia. Patient is also on Zoloft which has an SSRI may be contributing   to her hyponatremia.\"  Treatment: lasix IV x 1, salt tabs, hold Zoloft  Options provided:  -- SIADH present as evidenced by, Please document evidence.  -- SIADH was ruled out  -- Other - I will add my own diagnosis  -- Disagree - Not applicable / Not valid  -- Disagree - Clinically unable to determine / Unknown  -- Refer to Clinical Documentation Reviewer    PROVIDER RESPONSE TEXT:    Provider is clinically unable to determine a response to this query.    Query created by:

## 2024-11-15 NOTE — PLAN OF CARE
Problem: Discharge Planning  Goal: Discharge to home or other facility with appropriate resources  11/14/2024 2211 by Saji Pinto RN  Outcome: Progressing  11/14/2024 1040 by Teresa Servin RN  Outcome: Progressing     Problem: Pain  Goal: Verbalizes/displays adequate comfort level or baseline comfort level  11/14/2024 2211 by Saji Pinto RN  Outcome: Progressing  11/14/2024 1040 by Teresa Servin RN  Outcome: Progressing     Problem: Safety - Adult  Goal: Free from fall injury  11/14/2024 2211 by Saji Pinto RN  Outcome: Progressing  11/14/2024 1040 by Teresa Servin RN  Outcome: Progressing     Problem: Confusion  Goal: Confusion, delirium, dementia, or psychosis is improved or at baseline  Description: INTERVENTIONS:  1. Assess for possible contributors to thought disturbance, including medications, impaired vision or hearing, underlying metabolic abnormalities, dehydration, psychiatric diagnoses, and notify attending LIP  2. [place] high risk fall precautions, as indicated  3. Provide frequent short contacts to provide reality reorientation, refocusing and direction  4. Decrease environmental stimuli, including noise as appropriate  5. Monitor and intervene to maintain adequate nutrition, hydration, elimination, sleep and activity  6. If unable to ensure safety without constant attention obtain sitter and review sitter guidelines with assigned personnel  7. Initiate Psychosocial CNS and Spiritual Care consult, as indicated  11/14/2024 2211 by Saji Pinto RN  Outcome: Progressing  11/14/2024 1040 by Teresa Servin RN  Outcome: Progressing     Problem: ABCDS Injury Assessment  Goal: Absence of physical injury  11/14/2024 2211 by Saji Pinto RN  Outcome: Progressing  11/14/2024 1040 by Teresa Servin RN  Outcome: Progressing     Problem: Skin/Tissue Integrity  Goal: Absence of new skin breakdown  Description: 1.  Monitor for areas of redness and/or skin

## 2024-11-15 NOTE — PROGRESS NOTES
Per Guerda Rojo, NP- ok to discharge patient. If fails voiding trial- reinsert beckett and discharge with.

## 2024-11-15 NOTE — CARE COORDINATION
Social Work:    Fozia at [facility] advised that their  advised today that auth is still good through midnight tonight. Social work updated charge RN who is checking with physicians to see if Mrs. Luz is stable for discharge.     Electronically signed by SIENNA Cloud on 11/15/2024 at 9:50 AM

## 2024-11-15 NOTE — PROGRESS NOTES
11/15/2024 11:15 AM  Breanna KIMBERLY Luz  [id number]    Subjective:    She is laying in bed   Beckett was removed   She has not voided yet     Review of Systems  Constitutional: No fever or chills   Respiratory: negative for cough and hemoptysis  Cardiovascular: negative for chest pain and dyspnea  Gastrointestinal: negative for abdominal pain, diarrhea, nausea and vomiting   : See above  Derm: negative for rash and skin lesion(s)  Neurological: negative for seizures and tremors  Musculoskeletal: Negative    Psychiatric: Negative   All other reviews are negative      Scheduled Meds:   tamsulosin  0.4 mg Oral Daily    sodium chloride  1 g Oral TID WC    metoprolol succinate  12.5 mg Oral Daily    oxyCODONE-acetaminophen  1 tablet Oral BID    polyethylene glycol  17 g Oral Daily    LORazepam  0.5 mg Oral Daily    sodium chloride flush  5-40 mL IntraVENous 2 times per day    pantoprazole (PROTONIX) 40 mg in sodium chloride (PF) 0.9 % 10 mL injection  40 mg IntraVENous Q12H    aspirin  81 mg Oral Daily    busPIRone  5 mg Oral BID    docusate sodium  100 mg Oral BID    [Held by provider] lisinopril  10 mg Oral BID    cetirizine  5 mg Oral Daily    melatonin  3 mg Oral Nightly    rOPINIRole  2 mg Oral Nightly    sertraline  50 mg Oral Daily       Objective:  Vitals:    11/15/24 0726   BP: 133/64   Pulse: 89   Resp: 16   Temp: 98 °F (36.7 °C)   SpO2: 100%         Allergies: Patient has no known allergies.    General Appearance: alert and oriented to person, place and time and in no acute distress  Skin: no rash or erythema  Head: normocephalic and atraumatic  Pulmonary/Chest: normal air movement, no respiratory distress  Abdomen: soft, non-tender, non-distended  Genitourinary: no beckett   Extremities: no cyanosis, clubbing or edema         Labs:     Recent Labs     11/15/24  0646   *   K 3.9   CL 98   CO2 23   BUN 22   CREATININE 1.1*   GLUCOSE 103*   CALCIUM 8.4*       Lab Results   Component Value Date/Time    HGB 7.6

## 2024-11-15 NOTE — PROGRESS NOTES
[facility] Hospitalist Progress Note    Admitting Date and Time: 11/8/2024  8:52 PM  Admit Dx: Dehydration [E86.0]  Acute blood loss anemia [D62]  Demand ischemia (HCC) [I24.89]  Elevated troponin [R79.89]  Bilateral pleural effusion [J90]  HELEN (acute kidney injury) (HCC) [N17.9]  Anemia requiring transfusions [D64.9]  Gastrointestinal hemorrhage, unspecified gastrointestinal hemorrhage type [K92.2]    Subjective:  Patient is being followed for Dehydration [E86.0]  Acute blood loss anemia [D62]  Demand ischemia (HCC) [I24.89]  Elevated troponin [R79.89]  Bilateral pleural effusion [J90]  HELEN (acute kidney injury) (HCC) [N17.9]  Anemia requiring transfusions [D64.9]  Gastrointestinal hemorrhage, unspecified gastrointestinal hemorrhage type [K92.2]       Pt has no acute complaints. Tearful about SNF not being able to take care of her. Encourage to stay positive.     ROS: denies fever, chills, cp, sob, n/v, HA unless stated above.      tamsulosin  0.4 mg Oral Daily    sodium chloride  1 g Oral TID WC    metoprolol succinate  12.5 mg Oral Daily    oxyCODONE-acetaminophen  1 tablet Oral BID    polyethylene glycol  17 g Oral Daily    LORazepam  0.5 mg Oral Daily    sodium chloride flush  5-40 mL IntraVENous 2 times per day    pantoprazole (PROTONIX) 40 mg in sodium chloride (PF) 0.9 % 10 mL injection  40 mg IntraVENous Q12H    aspirin  81 mg Oral Daily    busPIRone  5 mg Oral BID    docusate sodium  100 mg Oral BID    [Held by provider] lisinopril  10 mg Oral BID    cetirizine  5 mg Oral Daily    melatonin  3 mg Oral Nightly    rOPINIRole  2 mg Oral Nightly    sertraline  50 mg Oral Daily     oxyCODONE-acetaminophen, 1 tablet, Daily PRN  sodium chloride flush, 5-40 mL, PRN  sodium chloride, , PRN  ondansetron, 4 mg, Q8H PRN   Or  ondansetron, 4 mg, Q6H PRN  acetaminophen, 650 mg, Q6H PRN   Or  acetaminophen, 650 mg, Q6H PRN  calcium carbonate, 1 tablet, TID PRN  Camphor-Menthol-Methyl Sal, 1 patch, Daily  PRN  meclizine, 25 mg, 4x Daily PRN  perflutren lipid microspheres, 1.5 mL, ONCE PRN  sodium chloride, , PRN         Objective:    /64   Pulse 89   Temp 98 °F (36.7 °C) (Infrared)   Resp 16   Ht 1.575 m (5' 2\")   Wt 69.8 kg (153 lb 14.1 oz)   LMP  (LMP Unknown)   SpO2 100%   BMI 28.15 kg/m²     General Appearance: Pleasantly Confused, in no acute distress  Skin: warm and dry  Head: normocephalic and atraumatic  Eyes: pupils equal, round, and reactive to light, extraocular eye movements intact, conjunctivae normal  Neck: neck supple and non tender without mass   Pulmonary/Chest: clear to auscultation bilaterally- no wheezes, rales or rhonchi, normal air movement, no respiratory distress  Cardiovascular: normal rate, normal S1 and S2 and no carotid bruits  Abdomen: soft, non-tender, non-distended, normal bowel sounds, no masses or organomegaly  Extremities: no cyanosis, no clubbing and no edema  Neurologic: no cranial nerve deficit and speech normal        Recent Labs     11/13/24  1440 11/14/24  0500 11/15/24  0646   * 127* 131*   K 4.4 4.0 3.9   CL 96* 95* 98   CO2 20* 21* 23   BUN 30* 30* 22   CREATININE 1.2* 1.3* 1.1*   GLUCOSE 96 84 103*   CALCIUM 8.5* 8.4* 8.4*       Recent Labs     11/13/24  0600 11/15/24  0646   WBC 7.2 6.9   RBC 2.64* 2.56*   HGB 8.0* 7.6*   HCT 24.7* 24.0*   MCV 93.6 93.8   MCH 30.3 29.7   MCHC 32.4 31.7*   RDW 18.5* 18.6*   MPV Can not be calculated Can not be calculated         Assessment:    Principal Problem:    Acute blood loss anemia  Active Problems:    Stage 3a chronic kidney disease (HCC)    Gastrointestinal hemorrhage with melena    Elevated troponin  Resolved Problems:    * No resolved hospital problems. *    11/15: plan to d/c to SNF. Patient is at baseline. Urology Ok for patient to d/c w/ beckett OP. Continue salt tabs for hypoNa    Plan:  Acute blood loss anemia: 2/2 DARREN.  Hgb on admission 6.8 baseline Hgb 8.5.  S/p PRBC transfused.  Monitor and transfuse to

## 2024-11-15 NOTE — PLAN OF CARE
Problem: Discharge Planning  Goal: Discharge to home or other facility with appropriate resources  11/15/2024 0908 by Clementina Velez RN  Outcome: Progressing  11/14/2024 2211 by Saji Pinto RN  Outcome: Progressing     Problem: Pain  Goal: Verbalizes/displays adequate comfort level or baseline comfort level  11/15/2024 0908 by Clementina Velez RN  Outcome: Progressing  11/14/2024 2211 by Saji Pinto RN  Outcome: Progressing     Problem: Safety - Adult  Goal: Free from fall injury  11/15/2024 0908 by Clementina Velez RN  Outcome: Progressing  11/14/2024 2211 by Saji Pinto RN  Outcome: Progressing     Problem: Confusion  Goal: Confusion, delirium, dementia, or psychosis is improved or at baseline  Description: INTERVENTIONS:  1. Assess for possible contributors to thought disturbance, including medications, impaired vision or hearing, underlying metabolic abnormalities, dehydration, psychiatric diagnoses, and notify attending LIP  2. [place] high risk fall precautions, as indicated  3. Provide frequent short contacts to provide reality reorientation, refocusing and direction  4. Decrease environmental stimuli, including noise as appropriate  5. Monitor and intervene to maintain adequate nutrition, hydration, elimination, sleep and activity  6. If unable to ensure safety without constant attention obtain sitter and review sitter guidelines with assigned personnel  7. Initiate Psychosocial CNS and Spiritual Care consult, as indicated  11/15/2024 0908 by Clementina Velez RN  Outcome: Progressing  11/14/2024 2211 by Saji Pinto RN  Outcome: Progressing     Problem: ABCDS Injury Assessment  Goal: Absence of physical injury  11/15/2024 0908 by Clementina Velez RN  Outcome: Progressing  11/14/2024 2211 by Saji Pinto RN  Outcome: Progressing     Problem: Skin/Tissue Integrity  Goal: Absence of new skin breakdown  Description: 1.  Monitor for areas of redness and/or skin

## 2024-11-15 NOTE — DISCHARGE SUMMARY
[facility] Hospitalist Physician Discharge Summary       [facility]  [address] [address]  [phone]          Activity level: As tolerated     Dispo: SNF      Condition on discharge: Stable     Patient ID:  Breanna Luz  [id number]  89 y.o.  6/28/1931    Admit date: 11/8/2024    Discharge date and time:  11/15/2024  7:10 PM    Admission Diagnoses: Principal Problem (Resolved):    Acute blood loss anemia  Active Problems:    Stage 3a chronic kidney disease (HCC)    Urinary retention  Resolved Problems:    Gastrointestinal hemorrhage with melena    Elevated troponin      Discharge Diagnoses: Principal Problem (Resolved):    Acute blood loss anemia  Active Problems:    Stage 3a chronic kidney disease (HCC)    Urinary retention  Resolved Problems:    Gastrointestinal hemorrhage with melena    Elevated troponin      Consults:  IP CONSULT TO CARDIOLOGY  IP CONSULT TO GI  IP CONSULT TO SOCIAL WORK  IP CONSULT TO VASCULAR ACCESS TEAM  IP CONSULT TO NEPHROLOGY  IP CONSULT TO UROLOGY    Procedures: None    Hospital Course:   Patient Breanna Luz is a 89 y.o. presented with Dehydration [E86.0]  Acute blood loss anemia [D62]  Demand ischemia (HCC) [I24.89]  Elevated troponin [R79.89]  Bilateral pleural effusion [J90]  HELEN (acute kidney injury) (HCC) [N17.9]  Anemia requiring transfusions [D64.9]  Gastrointestinal hemorrhage, unspecified gastrointestinal hemorrhage type [K92.2]    Patient is a 89-year-old female with a past medical history of hypertension, hyperlipidemia, coronary artery disease, chronic kidney disease stage IIIa (baseline serum creatinine 1.1-1.5 mg/dL), left hip fracture, protein-calorie malnutrition, and nursing home residency. She presented from her skilled nursing facility (SNF) with a serum sodium of 128 mmol/L, hypotension, and weakness.    On admission, her renal function was at baseline with a serum creatinine of 1.4 mg/dL. Hyponatremia was initially

## 2024-11-16 NOTE — DISCHARGE SUMMARY
[facility] Hospitalist Physician Discharge Summary       [facility]  [address] [address]  [phone]          Activity level: As tolerated     Dispo: SNF      Condition on discharge: Stable     Patient ID:  Breanna Luz  [id number]  89 y.o.  6/28/1931    Admit date: 11/8/2024    Discharge date and time:  11/15/2024  7:14 PM    Admission Diagnoses: Principal Problem (Resolved):    Acute blood loss anemia  Active Problems:    Stage 3a chronic kidney disease (HCC)    Urinary retention  Resolved Problems:    Gastrointestinal hemorrhage with melena    Elevated troponin      Discharge Diagnoses: Principal Problem (Resolved):    Acute blood loss anemia  Active Problems:    Stage 3a chronic kidney disease (HCC)    Urinary retention  Resolved Problems:    Gastrointestinal hemorrhage with melena    Elevated troponin      Consults:  IP CONSULT TO CARDIOLOGY  IP CONSULT TO GI  IP CONSULT TO SOCIAL WORK  IP CONSULT TO VASCULAR ACCESS TEAM  IP CONSULT TO NEPHROLOGY  IP CONSULT TO UROLOGY    Procedures: None    Hospital Course:   Patient Breanna Luz is a 89 y.o. presented with Dehydration [E86.0]  Acute blood loss anemia [D62]  Demand ischemia (HCC) [I24.89]  Elevated troponin [R79.89]  Bilateral pleural effusion [J90]  HELEN (acute kidney injury) (HCC) [N17.9]  Anemia requiring transfusions [D64.9]  Gastrointestinal hemorrhage, unspecified gastrointestinal hemorrhage type [K92.2]    Patient is a 89-year-old female with a past medical history of hypertension, hyperlipidemia, coronary artery disease, chronic kidney disease stage IIIa (baseline serum creatinine 1.1-1.5 mg/dL), left hip fracture, protein-calorie malnutrition, and nursing home residency. She presented from her skilled nursing facility (SNF) with a serum sodium of 128 mmol/L, hypotension, and weakness.    On admission, her renal function was at baseline with a serum creatinine of 1.4 mg/dL. Hyponatremia was initially

## 2024-11-16 NOTE — PROGRESS NOTES
Santi Miguel notified of midnight authorization expiration and [facility] new ETA of 1:30-2:30am. He spoke with supervisor at [facility] and informed them of this he reports they will push up the pickup time.

## 2024-11-16 NOTE — PLAN OF CARE
Problem: Discharge Planning  Goal: Discharge to home or other facility with appropriate resources  11/15/2024 2059 by Lisa Gonzalez RN  Outcome: Completed  11/15/2024 0908 by Clementina Velez RN  Outcome: Progressing     Problem: Pain  Goal: Verbalizes/displays adequate comfort level or baseline comfort level  11/15/2024 2059 by Lisa Gonzalez RN  Outcome: Completed  11/15/2024 0908 by Clementina Velez RN  Outcome: Progressing     Problem: Safety - Adult  Goal: Free from fall injury  11/15/2024 2059 by Lisa Gonzalez RN  Outcome: Completed  11/15/2024 0908 by Clementina Velez RN  Outcome: Progressing     Problem: Confusion  Goal: Confusion, delirium, dementia, or psychosis is improved or at baseline  Description: INTERVENTIONS:  1. Assess for possible contributors to thought disturbance, including medications, impaired vision or hearing, underlying metabolic abnormalities, dehydration, psychiatric diagnoses, and notify attending [facility]  2. [place] high risk fall precautions, as indicated  3. Provide frequent short contacts to provide reality reorientation, refocusing and direction  4. Decrease environmental stimuli, including noise as appropriate  5. Monitor and intervene to maintain adequate nutrition, hydration, elimination, sleep and activity  6. If unable to ensure safety without constant attention obtain sitter and review sitter guidelines with assigned personnel  7. Initiate Psychosocial CNS and Spiritual Care consult, as indicated  11/15/2024 2059 by Lisa Gonzalez RN  Outcome: Completed  11/15/2024 0908 by Clementina Velez RN  Outcome: Progressing     Problem: ABCDS Injury Assessment  Goal: Absence of physical injury  11/15/2024 2059 by Lisa Gonzalez RN  Outcome: Completed  11/15/2024 0908 by Clementina Velez RN  Outcome: Progressing     Problem: Skin/Tissue Integrity  Goal: Absence of new skin breakdown  Description: 1.  Monitor for areas of redness and/or skin breakdown  2.  Assess

## 2024-11-16 NOTE — PROGRESS NOTES
[facility] called in reports they are two minutes away and they will be able to get patient into [facility] prior to the midnight authorization expiration.

## 2024-11-16 NOTE — PROGRESS NOTES
[facility] arrived on floor to pick patient up to transport to [facility]. IV removed, pressure held. Heart monitor removed and returned to closet.

## 2024-11-18 ENCOUNTER — TELEPHONE (OUTPATIENT)
Dept: CARDIOLOGY CLINIC | Age: 89
End: 2024-11-18

## 2024-11-22 NOTE — TELEPHONE ENCOUNTER
Marisol at [facility] notified of Dr. Washington's recommendation. F/U scheduled for 3/11/25 at 11:20 a.m.